# Patient Record
Sex: FEMALE | Race: BLACK OR AFRICAN AMERICAN | NOT HISPANIC OR LATINO | Employment: FULL TIME | URBAN - METROPOLITAN AREA
[De-identification: names, ages, dates, MRNs, and addresses within clinical notes are randomized per-mention and may not be internally consistent; named-entity substitution may affect disease eponyms.]

---

## 2017-01-03 ENCOUNTER — OFFICE VISIT (OUTPATIENT)
Dept: CARDIOLOGY | Facility: CLINIC | Age: 47
End: 2017-01-03
Payer: OTHER GOVERNMENT

## 2017-01-03 VITALS
DIASTOLIC BLOOD PRESSURE: 75 MMHG | HEIGHT: 67 IN | OXYGEN SATURATION: 97 % | HEART RATE: 74 BPM | SYSTOLIC BLOOD PRESSURE: 108 MMHG | BODY MASS INDEX: 36.36 KG/M2 | WEIGHT: 231.69 LBS

## 2017-01-03 DIAGNOSIS — R07.9 CHEST PAIN, UNSPECIFIED TYPE: Primary | ICD-10-CM

## 2017-01-03 DIAGNOSIS — I25.10 CORONARY ARTERY DISEASE INVOLVING NATIVE CORONARY ARTERY OF NATIVE HEART WITHOUT ANGINA PECTORIS: ICD-10-CM

## 2017-01-03 DIAGNOSIS — E66.9 OBESITY (BMI 30-39.9): ICD-10-CM

## 2017-01-03 DIAGNOSIS — Z95.5 STATUS POST CORONARY ARTERY STENT PLACEMENT: ICD-10-CM

## 2017-01-03 DIAGNOSIS — I25.2 HISTORY OF NON-ST ELEVATION MYOCARDIAL INFARCTION (NSTEMI): ICD-10-CM

## 2017-01-03 PROCEDURE — 99213 OFFICE O/P EST LOW 20 MIN: CPT | Mod: PBBFAC,PO | Performed by: INTERNAL MEDICINE

## 2017-01-03 PROCEDURE — 99214 OFFICE O/P EST MOD 30 MIN: CPT | Mod: S$PBB,,, | Performed by: INTERNAL MEDICINE

## 2017-01-03 PROCEDURE — 99999 PR PBB SHADOW E&M-EST. PATIENT-LVL III: CPT | Mod: PBBFAC,,, | Performed by: INTERNAL MEDICINE

## 2017-01-03 NOTE — PROGRESS NOTES
Subjective:   Patient ID:  Lizet Palencia is a 46 y.o. female who presents for follow-up of Follow-up      Problem List Items Addressed This Visit        Cardiac    CAD (coronary artery disease)    History of non-ST elevation myocardial infarction (NSTEMI)       Fluids/Electrolytes/Nutrition/GI    Obesity (BMI 30-39.9)       Other    Status post coronary artery stent placement      Other Visit Diagnoses     Chest pain, unspecified type    -  Primary          HPI: Patient with PMh of CAD s/p stent. Since previous visit she had hospital visit for chest pain and angiogram done was negative for obstructive CAD. She is concerned because 2 day ago she started having a heaviness in her chest located mid sternally. No associated SOB. She is active with kids, work, shopping and she climb stairs at work and she never experience chest discomfort. BP is controlled. Heaviness has been constant lasting for the last 2 days.     Review of Systems   Constitution: Negative.   HENT: Negative.    Eyes: Negative.    Cardiovascular: Negative.    Respiratory: Negative.    Endocrine: Negative.    Hematologic/Lymphatic: Negative.    Skin: Negative.    Musculoskeletal: Negative.    Gastrointestinal: Negative.    Neurological: Negative.        Patient's Medications   New Prescriptions    No medications on file   Previous Medications    AMLODIPINE (NORVASC) 10 MG TABLET    Take 1 tablet (10 mg total) by mouth once daily.    ASPIRIN (ECOTRIN) 81 MG EC TABLET    Take 1 tablet (81 mg total) by mouth once daily.    ATORVASTATIN (LIPITOR) 80 MG TABLET    Take 1 tablet (80 mg total) by mouth once daily.    CARVEDILOL (COREG) 25 MG TABLET    Take 1 tablet (25 mg total) by mouth 2 (two) times daily with meals.    CHLORTHALIDONE (HYGROTEN) 25 MG TAB    Take 1 tablet (25 mg total) by mouth once daily.    HYDRALAZINE (APRESOLINE) 25 MG TABLET    Take 1 tablet (25 mg total) by mouth every 12 (twelve) hours.    LORATADINE (CLARITIN) 10 MG TABLET     TAKE 1 TABLET BY MOUTH EVERY DAY    LOSARTAN (COZAAR) 100 MG TABLET    Take 1 tablet (100 mg total) by mouth once daily.    NITROGLYCERIN (NITROSTAT) 0.4 MG SL TABLET    Place 1 tablet (0.4 mg total) under the tongue every 5 (five) minutes as needed for Chest pain.    PANTOPRAZOLE (PROTONIX) 40 MG TABLET    Take 1 tablet (40 mg total) by mouth once daily.    POTASSIUM CHLORIDE SA (K-DUR,KLOR-CON) 20 MEQ TABLET    Take 1 tablet (20 mEq total) by mouth once daily.    TRIAMCINOLONE ACETONIDE 0.1% (KENALOG) 0.1 % CREAM    Apply topically 2 (two) times daily.   Modified Medications    No medications on file   Discontinued Medications    No medications on file       Objective:   Physical Exam   Constitutional: She is oriented to person, place, and time. She appears well-developed and well-nourished. No distress.   Examination of the digits showed no clubbing or cyanosis   HENT:   Head: Normocephalic and atraumatic.   Eyes: Conjunctivae are normal. Pupils are equal, round, and reactive to light. Right eye exhibits no discharge.   Neck: Normal range of motion. Neck supple. No JVD present. No thyromegaly present.   No carotid bruits   Cardiovascular: Normal rate, regular rhythm, S1 normal, S2 normal, normal heart sounds, intact distal pulses and normal pulses.  PMI is not displaced.  Exam reveals no gallop, no friction rub and no opening snap.    No murmur heard.  Pulmonary/Chest: Effort normal and breath sounds normal. No respiratory distress. She has no wheezes. She has no rales. She exhibits no tenderness.   Abdominal: Soft. Bowel sounds are normal. She exhibits no distension and no mass. There is no tenderness. There is no guarding.   No hepatosplenomegaly   Musculoskeletal: Normal range of motion. She exhibits no edema or tenderness.   Lymphadenopathy:     She has no cervical adenopathy.   Neurological: She is alert and oriented to person, place, and time.   Skin: Skin is warm. No rash noted. She is not diaphoretic. No  erythema.   Psychiatric: She has a normal mood and affect.   Nursing note and vitals reviewed.      ECGs reviewed  LABS reviewed  Imaging including Echoes reviewed    Angiographic Results     Diagnostic:          Patient has a left dominant coronary artery.        The coronary vessels are all normal.        - Left Main Coronary Artery:             The LM is normal. There is BERTHA 3 flow.     - Left Anterior Descending Artery:             The LAD is normal. There is BERTHA 3 flow.     - Left Circumflex Artery:             The mid LCX is normal is patent. There is BERTHA 3 flow.     - Right Coronary Artery:             The RCA is normal. There is BERTHA 3 flow.     - Left Renal Artery:             The proximal left renal is normal.     - Right Renal Artery:             The proximal right renal is normal.     - Common Femoral Artery:             The right CFA is normal.     - Femoral Artery:             The femoral artery is normal.      Assessment:     1. Chest pain, unspecified type    2. Coronary artery disease involving native coronary artery of native heart without angina pectoris    3. History of non-ST elevation myocardial infarction (NSTEMI)    4. Obesity (BMI 30-39.9)    5. Status post coronary artery stent placement        Plan:     Continue current medications. Symptoms being experienced by patient does not appear to be cardiac.  Activity as tolerated  Weight loss  F/u in 6 months

## 2017-05-13 ENCOUNTER — PATIENT MESSAGE (OUTPATIENT)
Dept: FAMILY MEDICINE | Facility: CLINIC | Age: 47
End: 2017-05-13

## 2017-05-15 ENCOUNTER — TELEPHONE (OUTPATIENT)
Dept: FAMILY MEDICINE | Facility: CLINIC | Age: 47
End: 2017-05-15

## 2017-05-15 NOTE — TELEPHONE ENCOUNTER
----- Message from Jerilyn Harvey sent at 5/13/2017  8:41 AM CDT -----  Contact: [phone]/self  Pt would like to speak with you about getting the earlier appointment that was offered to her via e-mail.   Please advise

## 2017-06-05 DIAGNOSIS — Z95.5 STATUS POST CORONARY ARTERY STENT PLACEMENT: ICD-10-CM

## 2017-06-05 DIAGNOSIS — I25.10 CORONARY ARTERY DISEASE INVOLVING NATIVE CORONARY ARTERY OF NATIVE HEART WITHOUT ANGINA PECTORIS: ICD-10-CM

## 2017-06-09 RX ORDER — ATORVASTATIN CALCIUM 80 MG/1
TABLET, FILM COATED ORAL
Qty: 30 TABLET | Refills: 0 | Status: SHIPPED | OUTPATIENT
Start: 2017-06-09 | End: 2017-10-25 | Stop reason: SDUPTHER

## 2017-07-26 ENCOUNTER — TELEPHONE (OUTPATIENT)
Dept: FAMILY MEDICINE | Facility: CLINIC | Age: 47
End: 2017-07-26

## 2017-07-26 NOTE — TELEPHONE ENCOUNTER
----- Message from Marline Simpson sent at 7/26/2017  7:32 AM CDT -----  Contact: Self/ [phone]  Patient would like to be seen today because she is having headaches. Please advise.

## 2017-07-26 NOTE — TELEPHONE ENCOUNTER
Returned patient's call. She stated she has been having headaches and would like to be seen today. I informed her that dr. Louis is booked for the day. I offered her an urgent visit at [place], and she declined. She stated she will see how she feels as the day goes on.

## 2017-08-15 ENCOUNTER — TELEPHONE (OUTPATIENT)
Dept: FAMILY MEDICINE | Facility: CLINIC | Age: 47
End: 2017-08-15

## 2017-08-15 DIAGNOSIS — Z12.31 ENCOUNTER FOR SCREENING MAMMOGRAM FOR BREAST CANCER: Primary | ICD-10-CM

## 2017-08-15 NOTE — TELEPHONE ENCOUNTER
----- Message from Clara Vann MA sent at 8/15/2017  9:34 AM CDT -----  Contact: [phone]/SELF      ----- Message -----  From: Jerilyn Harvey  Sent: 8/15/2017   9:30 AM  To: Missy JOVEL Staff    Patient would like orders put in the system for a Mammo. Please advise.

## 2017-09-27 ENCOUNTER — HOSPITAL ENCOUNTER (OUTPATIENT)
Dept: RADIOLOGY | Facility: HOSPITAL | Age: 47
Discharge: HOME | End: 2017-09-27
Attending: FAMILY MEDICINE
Payer: OTHER GOVERNMENT

## 2017-09-27 VITALS — WEIGHT: 231 LBS | HEIGHT: 67 IN | BODY MASS INDEX: 36.26 KG/M2

## 2017-09-27 DIAGNOSIS — Z12.31 ENCOUNTER FOR SCREENING MAMMOGRAM FOR BREAST CANCER: ICD-10-CM

## 2017-09-27 PROCEDURE — 77067 SCR MAMMO BI INCL CAD: CPT | Mod: TC

## 2017-09-27 PROCEDURE — 77067 SCR MAMMO BI INCL CAD: CPT | Mod: 26,,, | Performed by: RADIOLOGY

## 2017-10-25 ENCOUNTER — OFFICE VISIT (OUTPATIENT)
Dept: FAMILY MEDICINE | Facility: CLINIC | Age: 47
End: 2017-10-25
Payer: OTHER GOVERNMENT

## 2017-10-25 VITALS
DIASTOLIC BLOOD PRESSURE: 89 MMHG | HEIGHT: 69 IN | TEMPERATURE: 99 F | OXYGEN SATURATION: 99 % | SYSTOLIC BLOOD PRESSURE: 127 MMHG | WEIGHT: 232.56 LBS | BODY MASS INDEX: 34.45 KG/M2 | HEART RATE: 81 BPM

## 2017-10-25 DIAGNOSIS — R76.8 HEPATITIS C ANTIBODY TEST POSITIVE: ICD-10-CM

## 2017-10-25 DIAGNOSIS — J30.9 CHRONIC ALLERGIC RHINITIS, UNSPECIFIED SEASONALITY, UNSPECIFIED TRIGGER: ICD-10-CM

## 2017-10-25 DIAGNOSIS — Z95.5 STATUS POST CORONARY ARTERY STENT PLACEMENT: ICD-10-CM

## 2017-10-25 DIAGNOSIS — I21.4 NSTEMI (NON-ST ELEVATED MYOCARDIAL INFARCTION): ICD-10-CM

## 2017-10-25 DIAGNOSIS — E66.9 OBESITY (BMI 30-39.9): ICD-10-CM

## 2017-10-25 DIAGNOSIS — Z79.82 LONG-TERM USE OF ASPIRIN THERAPY: ICD-10-CM

## 2017-10-25 DIAGNOSIS — I25.10 CORONARY ARTERY DISEASE INVOLVING NATIVE CORONARY ARTERY OF NATIVE HEART WITHOUT ANGINA PECTORIS: ICD-10-CM

## 2017-10-25 DIAGNOSIS — I25.2 HISTORY OF NON-ST ELEVATION MYOCARDIAL INFARCTION (NSTEMI): ICD-10-CM

## 2017-10-25 DIAGNOSIS — Z00.00 ROUTINE GENERAL MEDICAL EXAMINATION AT A HEALTH CARE FACILITY: Primary | ICD-10-CM

## 2017-10-25 DIAGNOSIS — E87.6 DIURETIC-INDUCED HYPOKALEMIA: ICD-10-CM

## 2017-10-25 DIAGNOSIS — I10 ACCELERATED HYPERTENSION: ICD-10-CM

## 2017-10-25 DIAGNOSIS — T50.2X5A DIURETIC-INDUCED HYPOKALEMIA: ICD-10-CM

## 2017-10-25 DIAGNOSIS — Z79.899 MEDICATION MANAGEMENT: ICD-10-CM

## 2017-10-25 DIAGNOSIS — Z23 NEED FOR DIPHTHERIA-TETANUS-PERTUSSIS (TDAP) VACCINE: ICD-10-CM

## 2017-10-25 PROCEDURE — 90715 TDAP VACCINE 7 YRS/> IM: CPT | Mod: PBBFAC,PO

## 2017-10-25 PROCEDURE — 99999 PR PBB SHADOW E&M-EST. PATIENT-LVL III: CPT | Mod: PBBFAC,,, | Performed by: FAMILY MEDICINE

## 2017-10-25 PROCEDURE — 90471 IMMUNIZATION ADMIN: CPT | Mod: PBBFAC,PO

## 2017-10-25 PROCEDURE — 99213 OFFICE O/P EST LOW 20 MIN: CPT | Mod: PBBFAC,PO | Performed by: FAMILY MEDICINE

## 2017-10-25 PROCEDURE — 99396 PREV VISIT EST AGE 40-64: CPT | Mod: S$PBB,,, | Performed by: FAMILY MEDICINE

## 2017-10-25 RX ORDER — ATORVASTATIN CALCIUM 80 MG/1
80 TABLET, FILM COATED ORAL DAILY
Qty: 90 TABLET | Refills: 3 | Status: SHIPPED | OUTPATIENT
Start: 2017-10-25 | End: 2019-05-22 | Stop reason: SDUPTHER

## 2017-10-25 RX ORDER — CHLORTHALIDONE 25 MG/1
25 TABLET ORAL DAILY
Qty: 90 TABLET | Refills: 3 | Status: SHIPPED | OUTPATIENT
Start: 2017-10-25 | End: 2019-05-22 | Stop reason: SDUPTHER

## 2017-10-25 RX ORDER — LOSARTAN POTASSIUM 100 MG/1
100 TABLET ORAL DAILY
Qty: 90 TABLET | Refills: 3 | Status: SHIPPED | OUTPATIENT
Start: 2017-10-25 | End: 2019-05-22 | Stop reason: SDUPTHER

## 2017-10-25 RX ORDER — CARVEDILOL 25 MG/1
25 TABLET ORAL 2 TIMES DAILY WITH MEALS
Qty: 180 TABLET | Refills: 3 | Status: SHIPPED | OUTPATIENT
Start: 2017-10-25 | End: 2019-01-09 | Stop reason: SDUPTHER

## 2017-10-25 RX ORDER — POTASSIUM CHLORIDE 20 MEQ/1
20 TABLET, EXTENDED RELEASE ORAL DAILY
Qty: 90 TABLET | Refills: 3 | Status: SHIPPED | OUTPATIENT
Start: 2017-10-25 | End: 2018-03-14 | Stop reason: SDUPTHER

## 2017-10-25 RX ORDER — AMLODIPINE BESYLATE 10 MG/1
10 TABLET ORAL DAILY
Qty: 90 TABLET | Refills: 3 | Status: SHIPPED | OUTPATIENT
Start: 2017-10-25 | End: 2018-03-10 | Stop reason: SDUPTHER

## 2017-10-25 RX ORDER — HYDRALAZINE HYDROCHLORIDE 25 MG/1
25 TABLET, FILM COATED ORAL EVERY 12 HOURS
Qty: 180 TABLET | Refills: 3 | Status: SHIPPED | OUTPATIENT
Start: 2017-10-25 | End: 2019-10-16

## 2017-10-25 NOTE — PROGRESS NOTES
Office Visit    Patient Name: Lizet Palencia    : 1970  MRN: [id number]    Subjective:  Lizet is a 47 y.o. female who presents today for:    Annual Exam (having sinus issues for the past month)    Lizet Palencia presents today for annual wellness exam.  She is of childbearing age and having regular periods.  No hot flashes. She has a gynecologist and is up to date with pap-- last seen by Dr Crocker 2015 and     They have been feeling overall well but having ongoing sinus symptoms-- itchy throat and post nasal drip. Takes flonase as needed.      General lifestyle habits are as follows:  Diet is described as fair-- watching salt and avoids fried foods but eats out a lot-- does try to order fish and healthier options, exercise is described as fair-- recently started back walking, sleep is described as fair-- needs to sleep more and does snore but does snore and no morning headaches. Weight is recently increased, she gained back the 10 lbs she was able to loose at this time last year.     Immunizations: none on file, declines FLU shot, TDaP due and ordered    Screening Tests: mammogram 2017-- repeat 1 year, normal PAP/HPV 2015    Eye/Dental: up to date with both      2016: angiogram negative for obstructive CAD, follows with cardiology Dr Stauffer with most recent visit 1/3/2017    Past Medical History  Past Medical History:   Diagnosis Date    Accelerated hypertension 2015    Allergic rhinitis 1/3/2015    Coronary artery disease 2016    Hepatitis C antibody test positive 2016    Negative HCV RNA 2016, will check again in 3 months but no detectable HCV virus HCV AB will likely remain positive for life    History of non-ST elevation myocardial infarction (NSTEMI) 2016    Menorrhagia     Obesity (BMI 30-39.9) 2015    Status post coronary artery stent placement 2015    LCx stent         Past Surgical History  Past Surgical History:   Procedure  "Laterality Date     SECTION, CLASSIC      x3    CORONARY STENT PLACEMENT      D&C Hysteroscopy      right arm surgery  2009    TUBAL LIGATION         Family History  Family History   Problem Relation Age of Onset    Hypertension Mother     Diabetes Maternal Aunt     Hypertension Maternal Aunt     Cancer Neg Hx     Heart disease Neg Hx        Social History  Social History     Social History    Marital status:      Spouse name: N/A    Number of children: N/A    Years of education: N/A     Occupational History    Not on file.     Social History Main Topics    Smoking status: Never Smoker    Smokeless tobacco: Never Used    Alcohol use Yes      Comment: social    Drug use: No    Sexual activity: Yes     Partners: Male     Birth control/ protection: Surgical     Other Topics Concern    Not on file     Social History Narrative    No narrative on file       Current Medications  Medications reviewed and updated.     Allergies   Review of patient's allergies indicates:  No Known Allergies    Review of Systems (Pertinent positives)  Review of Systems   Constitutional: Negative for unexpected weight change.   HENT: Positive for postnasal drip. Negative for sinus pressure.    Eyes: Negative for visual disturbance.   Respiratory: Negative for chest tightness and shortness of breath.    Cardiovascular: Negative for chest pain, palpitations and leg swelling.   Gastrointestinal: Negative for constipation and diarrhea.   Genitourinary: Negative for difficulty urinating and menstrual problem.   Musculoskeletal: Negative for arthralgias and back pain.   Neurological: Negative for dizziness, light-headedness and headaches.   Psychiatric/Behavioral: Negative for sleep disturbance.       /89 (BP Location: Left arm, Patient Position: Sitting)   Pulse 81   Temp 98.8 °F (37.1 °C)   Ht 5' 9" (1.753 m)   Wt 105.5 kg (232 lb 9.4 oz)   LMP 10/01/2017   SpO2 99%   BMI 34.35 kg/m²     Physical " Exam   Constitutional: She is oriented to person, place, and time. She appears well-developed and well-nourished. No distress.   HENT:   Head: Normocephalic and atraumatic.   Right Ear: Ear canal normal. Tympanic membrane is not erythematous and not bulging.   Left Ear: Ear canal normal. Tympanic membrane is not erythematous and not bulging.   Mouth/Throat: No oropharyngeal exudate.   Eyes: Conjunctivae are normal.   Neck: Carotid bruit is not present. No thyroid mass and no thyromegaly present.   Cardiovascular: Normal rate, regular rhythm and normal heart sounds.    No murmur heard.  Pulses:       Dorsalis pedis pulses are 2+ on the right side, and 2+ on the left side.   Pulmonary/Chest: Effort normal and breath sounds normal. No respiratory distress.   Abdominal: Soft. Bowel sounds are normal. She exhibits no distension and no mass. There is no hepatosplenomegaly. There is no tenderness.   Musculoskeletal: Normal range of motion.   Lymphadenopathy:     She has no cervical adenopathy.   Neurological: She is alert and oriented to person, place, and time.   Skin: Skin is warm and dry. No rash noted.   Psychiatric: She has a normal mood and affect.   Vitals reviewed.        Assessment/Plan:  Lizet Palencia is a 47 y.o. female who presents today for :    Lizet was seen today for annual exam.    Diagnoses and all orders for this visit:    Routine general medical examination at a health care facility  -     Comprehensive metabolic panel; Future  -     Hemoglobin A1c; Future  -     Lipid panel; Future  -     TSH; Future  -     CBC auto differential; Future    History of non-ST elevation myocardial infarction (NSTEMI)    Obesity (BMI 30-39.9)  -     Comprehensive metabolic panel; Future  -     Hemoglobin A1c; Future  -     Lipid panel; Future  -     TSH; Future    Accelerated hypertension  -     Comprehensive metabolic panel; Future  -     Lipid panel; Future  -     TSH; Future  -     CBC auto differential;  Future  -     losartan (COZAAR) 100 MG tablet; Take 1 tablet (100 mg total) by mouth once daily.  -     hydrALAZINE (APRESOLINE) 25 MG tablet; Take 1 tablet (25 mg total) by mouth every 12 (twelve) hours.  -     chlorthalidone (HYGROTEN) 25 MG Tab; Take 1 tablet (25 mg total) by mouth once daily.  -     carvedilol (COREG) 25 MG tablet; Take 1 tablet (25 mg total) by mouth 2 (two) times daily with meals.  -     amLODIPine (NORVASC) 10 MG tablet; Take 1 tablet (10 mg total) by mouth once daily.    Chronic allergic rhinitis, unspecified seasonality, unspecified trigger    Coronary artery disease involving native coronary artery of native heart without angina pectoris  -     carvedilol (COREG) 25 MG tablet; Take 1 tablet (25 mg total) by mouth 2 (two) times daily with meals.  -     atorvastatin (LIPITOR) 80 MG tablet; Take 1 tablet (80 mg total) by mouth once daily.    Hepatitis C antibody test positive  -     HEPATITIS C RNA, QUANTITATIVE, PCR; Future    Status post coronary artery stent placement  -     losartan (COZAAR) 100 MG tablet; Take 1 tablet (100 mg total) by mouth once daily.  -     atorvastatin (LIPITOR) 80 MG tablet; Take 1 tablet (80 mg total) by mouth once daily.    Long-term use of aspirin therapy    Medication management  -     Comprehensive metabolic panel; Future  -     Hemoglobin A1c; Future  -     Lipid panel; Future  -     TSH; Future  -     CBC auto differential; Future    Need for diphtheria-tetanus-pertussis (Tdap) vaccine  -     (In Office Administered) Tdap Vaccine    Diuretic-induced hypokalemia  -     potassium chloride SA (K-DUR,KLOR-CON) 20 MEQ tablet; Take 1 tablet (20 mEq total) by mouth once daily.    NSTEMI (non-ST elevated myocardial infarction)  -     losartan (COZAAR) 100 MG tablet; Take 1 tablet (100 mg total) by mouth once daily.    Coronary artery disease involving native coronary artery of native heart without angina pectoris  Comments:  UNREMARKABLE ANGIOGRAM 7/2016, follows  with Dr Stauffer Cardiology  Orders:  -     carvedilol (COREG) 25 MG tablet; Take 1 tablet (25 mg total) by mouth 2 (two) times daily with meals.  -     atorvastatin (LIPITOR) 80 MG tablet; Take 1 tablet (80 mg total) by mouth once daily.            ICD-10-CM ICD-9-CM    1. Routine general medical examination at a health care facility Z00.00 V70.0 Comprehensive metabolic panel      Hemoglobin A1c      Lipid panel      TSH      CBC auto differential   2. History of non-ST elevation myocardial infarction (NSTEMI) I25.2 412    3. Obesity (BMI 30-39.9) E66.9 278.00 Comprehensive metabolic panel      Hemoglobin A1c      Lipid panel      TSH   4. Accelerated hypertension I10 401.0 Comprehensive metabolic panel      Lipid panel      TSH      CBC auto differential      losartan (COZAAR) 100 MG tablet      hydrALAZINE (APRESOLINE) 25 MG tablet      chlorthalidone (HYGROTEN) 25 MG Tab      carvedilol (COREG) 25 MG tablet      amLODIPine (NORVASC) 10 MG tablet   5. Chronic allergic rhinitis, unspecified seasonality, unspecified trigger J30.9 477.9    6. Coronary artery disease involving native coronary artery of native heart without angina pectoris I25.10 414.01 carvedilol (COREG) 25 MG tablet      atorvastatin (LIPITOR) 80 MG tablet   7. Hepatitis C antibody test positive R76.8 795.79 HEPATITIS C RNA, QUANTITATIVE, PCR   8. Status post coronary artery stent placement Z95.5 V45.82 losartan (COZAAR) 100 MG tablet      atorvastatin (LIPITOR) 80 MG tablet   9. Long-term use of aspirin therapy Z79.82 V58.66    10. Medication management Z79.899 V58.69 Comprehensive metabolic panel      Hemoglobin A1c      Lipid panel      TSH      CBC auto differential   11. Need for diphtheria-tetanus-pertussis (Tdap) vaccine Z23 V06.1 (In Office Administered) Tdap Vaccine   12. Diuretic-induced hypokalemia E87.6 276.8 potassium chloride SA (K-DUR,KLOR-CON) 20 MEQ tablet    T50.2X5A E944.4    13. NSTEMI (non-ST elevated myocardial infarction) I21.4  "410.70 losartan (COZAAR) 100 MG tablet   14. Coronary artery disease involving native coronary artery of native heart without angina pectoris I25.10 414.01 carvedilol (COREG) 25 MG tablet      atorvastatin (LIPITOR) 80 MG tablet    UNREMARKABLE ANGIOGRAM 7/2016, follows with Dr Stauffer Cardiology       Patient Instructions   Continue all medications as prescribed.  Very important to take carvedilol (Coreg) both morning and night.  Okay to do a trial off of hydralazine since you have only been taking it once a day and it likely is not doing much for your pressures.  Please monitor home blood pressure and heart rates and resume hydralazine 2-3 times daily if they are elevated greater than 140/90 on a consistent basis.  Please keep us up to date and notify us if there are issues with elevated pressures.  For ALLERGIC rhinitis with postnasal drip, continue Flonase but start using it every day and due to persistent symptoms.  Start daily ALLEGRA nonsedating antihistamine to help dry up the postnasal drip/drainage.  Do not take any products that have a "D" i.e. decongestant as these can raise blood pressure.        Return in about 6 months (around 4/25/2018) for return as needed for new concerns.  "

## 2017-10-25 NOTE — PATIENT INSTRUCTIONS
"Continue all medications as prescribed.  Very important to take carvedilol (Coreg) both morning and night.  Okay to do a trial off of hydralazine since you have only been taking it once a day and it likely is not doing much for your pressures.  Please monitor home blood pressure and heart rates and resume hydralazine 2-3 times daily if they are elevated greater than 140/90 on a consistent basis.  Please keep us up to date and notify us if there are issues with elevated pressures.  For ALLERGIC rhinitis with postnasal drip, continue Flonase but start using it every day and due to persistent symptoms.  Start daily ALLEGRA nonsedating antihistamine to help dry up the postnasal drip/drainage.  Do not take any products that have a "D" i.e. decongestant as these can raise blood pressure.  "

## 2017-10-28 ENCOUNTER — LAB VISIT (OUTPATIENT)
Dept: LAB | Facility: HOSPITAL | Age: 47
End: 2017-10-28
Attending: FAMILY MEDICINE
Payer: OTHER GOVERNMENT

## 2017-10-28 DIAGNOSIS — Z79.899 MEDICATION MANAGEMENT: ICD-10-CM

## 2017-10-28 DIAGNOSIS — R76.8 HEPATITIS C ANTIBODY TEST POSITIVE: ICD-10-CM

## 2017-10-28 DIAGNOSIS — I10 ACCELERATED HYPERTENSION: ICD-10-CM

## 2017-10-28 DIAGNOSIS — E66.9 OBESITY (BMI 30-39.9): ICD-10-CM

## 2017-10-28 DIAGNOSIS — Z00.00 ROUTINE GENERAL MEDICAL EXAMINATION AT A HEALTH CARE FACILITY: ICD-10-CM

## 2017-10-28 LAB
BASOPHILS # BLD AUTO: 0.06 K/UL
BASOPHILS NFR BLD: 1.4 %
DIFFERENTIAL METHOD: ABNORMAL
EOSINOPHIL # BLD AUTO: 0.1 K/UL
EOSINOPHIL NFR BLD: 2.1 %
ERYTHROCYTE [DISTWIDTH] IN BLOOD BY AUTOMATED COUNT: 13.3 %
ESTIMATED AVG GLUCOSE: 131 MG/DL
HBA1C MFR BLD HPLC: 6.2 %
HCT VFR BLD AUTO: 39.6 %
HGB BLD-MCNC: 12.7 G/DL
IMM GRANULOCYTES # BLD AUTO: 0 K/UL
IMM GRANULOCYTES NFR BLD AUTO: 0 %
LYMPHOCYTES # BLD AUTO: 1.8 K/UL
LYMPHOCYTES NFR BLD: 43.5 %
MCH RBC QN AUTO: 29.7 PG
MCHC RBC AUTO-ENTMCNC: 32.1 G/DL
MCV RBC AUTO: 93 FL
MONOCYTES # BLD AUTO: 0.7 K/UL
MONOCYTES NFR BLD: 15.9 %
NEUTROPHILS # BLD AUTO: 1.6 K/UL
NEUTROPHILS NFR BLD: 37.1 %
NRBC BLD-RTO: 0 /100 WBC
PLATELET # BLD AUTO: 210 K/UL
PMV BLD AUTO: 11.5 FL
RBC # BLD AUTO: 4.27 M/UL
TSH SERPL DL<=0.005 MIU/L-ACNC: 0.55 UIU/ML
WBC # BLD AUTO: 4.21 K/UL

## 2017-10-28 PROCEDURE — 80061 LIPID PANEL: CPT

## 2017-10-28 PROCEDURE — 80053 COMPREHEN METABOLIC PANEL: CPT

## 2017-10-28 PROCEDURE — 36415 COLL VENOUS BLD VENIPUNCTURE: CPT | Mod: PO

## 2017-10-28 PROCEDURE — 87522 HEPATITIS C REVRS TRNSCRPJ: CPT

## 2017-10-28 PROCEDURE — 83036 HEMOGLOBIN GLYCOSYLATED A1C: CPT

## 2017-10-28 PROCEDURE — 84443 ASSAY THYROID STIM HORMONE: CPT

## 2017-10-28 PROCEDURE — 85025 COMPLETE CBC W/AUTO DIFF WBC: CPT

## 2017-10-29 LAB
ALBUMIN SERPL BCP-MCNC: 3.3 G/DL
ALP SERPL-CCNC: 38 U/L
ALT SERPL W/O P-5'-P-CCNC: 12 U/L
ANION GAP SERPL CALC-SCNC: 11 MMOL/L
AST SERPL-CCNC: 19 U/L
BILIRUB SERPL-MCNC: 0.7 MG/DL
BUN SERPL-MCNC: 14 MG/DL
CALCIUM SERPL-MCNC: 9.7 MG/DL
CHLORIDE SERPL-SCNC: 105 MMOL/L
CHOLEST SERPL-MCNC: 204 MG/DL
CHOLEST/HDLC SERPL: 4.3 {RATIO}
CO2 SERPL-SCNC: 23 MMOL/L
CREAT SERPL-MCNC: 0.9 MG/DL
EST. GFR  (AFRICAN AMERICAN): >60 ML/MIN/1.73 M^2
EST. GFR  (NON AFRICAN AMERICAN): >60 ML/MIN/1.73 M^2
GLUCOSE SERPL-MCNC: 84 MG/DL
HDLC SERPL-MCNC: 47 MG/DL
HDLC SERPL: 23 %
LDLC SERPL CALC-MCNC: 148.8 MG/DL
NONHDLC SERPL-MCNC: 157 MG/DL
POTASSIUM SERPL-SCNC: 3.5 MMOL/L
PROT SERPL-MCNC: 7.6 G/DL
SODIUM SERPL-SCNC: 139 MMOL/L
TRIGL SERPL-MCNC: 41 MG/DL

## 2017-11-01 LAB
HCV LOG: <1.08 LOG (10) IU/ML
HCV RNA QUANT PCR: <12 IU/ML
HCV, QUALITATIVE: NOT DETECTED IU/ML

## 2017-12-16 DIAGNOSIS — T50.2X5A DIURETIC-INDUCED HYPOKALEMIA: ICD-10-CM

## 2017-12-16 DIAGNOSIS — E87.6 DIURETIC-INDUCED HYPOKALEMIA: ICD-10-CM

## 2017-12-17 RX ORDER — POTASSIUM CHLORIDE 20 MEQ/1
20 TABLET, EXTENDED RELEASE ORAL DAILY
Qty: 90 TABLET | Refills: 4 | Status: SHIPPED | OUTPATIENT
Start: 2017-12-17 | End: 2018-03-14

## 2018-03-10 DIAGNOSIS — I10 ACCELERATED HYPERTENSION: ICD-10-CM

## 2018-03-10 RX ORDER — AMLODIPINE BESYLATE 10 MG/1
TABLET ORAL
Qty: 90 TABLET | Refills: 4 | Status: SHIPPED | OUTPATIENT
Start: 2018-03-10 | End: 2019-05-23 | Stop reason: SDUPTHER

## 2018-03-12 RX ORDER — TRIAMCINOLONE ACETONIDE 1 MG/G
CREAM TOPICAL
Refills: 1 | COMMUNITY
Start: 2018-02-05 | End: 2018-03-14

## 2018-03-14 ENCOUNTER — OFFICE VISIT (OUTPATIENT)
Dept: FAMILY MEDICINE | Facility: CLINIC | Age: 48
End: 2018-03-14
Payer: OTHER GOVERNMENT

## 2018-03-14 VITALS
SYSTOLIC BLOOD PRESSURE: 123 MMHG | HEART RATE: 77 BPM | TEMPERATURE: 99 F | DIASTOLIC BLOOD PRESSURE: 82 MMHG | BODY MASS INDEX: 37.54 KG/M2 | OXYGEN SATURATION: 98 % | HEIGHT: 67 IN | WEIGHT: 239.19 LBS

## 2018-03-14 DIAGNOSIS — I10 ACCELERATED HYPERTENSION: ICD-10-CM

## 2018-03-14 DIAGNOSIS — E66.9 OBESITY (BMI 30-39.9): ICD-10-CM

## 2018-03-14 DIAGNOSIS — E87.6 DIURETIC-INDUCED HYPOKALEMIA: ICD-10-CM

## 2018-03-14 DIAGNOSIS — R73.03 PREDIABETES: ICD-10-CM

## 2018-03-14 DIAGNOSIS — I25.10 CORONARY ARTERY DISEASE INVOLVING NATIVE CORONARY ARTERY OF NATIVE HEART WITHOUT ANGINA PECTORIS: Primary | ICD-10-CM

## 2018-03-14 DIAGNOSIS — T50.2X5A DIURETIC-INDUCED HYPOKALEMIA: ICD-10-CM

## 2018-03-14 DIAGNOSIS — D25.9 UTERINE LEIOMYOMA, UNSPECIFIED LOCATION: ICD-10-CM

## 2018-03-14 DIAGNOSIS — I25.2 HISTORY OF NON-ST ELEVATION MYOCARDIAL INFARCTION (NSTEMI): ICD-10-CM

## 2018-03-14 DIAGNOSIS — Z79.82 LONG-TERM USE OF ASPIRIN THERAPY: ICD-10-CM

## 2018-03-14 DIAGNOSIS — N93.9 VAGINAL BLEEDING: ICD-10-CM

## 2018-03-14 PROCEDURE — 99999 PR PBB SHADOW E&M-EST. PATIENT-LVL IV: CPT | Mod: PBBFAC,,, | Performed by: FAMILY MEDICINE

## 2018-03-14 PROCEDURE — 99214 OFFICE O/P EST MOD 30 MIN: CPT | Mod: S$PBB,,, | Performed by: FAMILY MEDICINE

## 2018-03-14 PROCEDURE — 99214 OFFICE O/P EST MOD 30 MIN: CPT | Mod: PBBFAC,PO | Performed by: FAMILY MEDICINE

## 2018-03-14 NOTE — PROGRESS NOTES
" Office Visit    Patient Name: Lizet Palencia    : 1970  MRN: [id number]    Subjective:  Lizet is a 48 y.o. female who presents today for:    Follow-up    47 yo female with CAD w/ h/o stent/NSTEMI, HTN, prediabetes, HLD, Obesity,  last seen by me 10/5/2017 for annual physical and here today for 6 month follow up.     Instructions following annual exam: "Continue all medications as prescribed.  Very important to take carvedilol (Coreg) both morning and night.  Okay to do a trial off of hydralazine since you have only been taking it once a day and it likely is not doing much for your pressures.  Please monitor home blood pressure and heart rates and resume hydralazine 2-3 times daily if they are elevated greater than 140/90 on a consistent basis."    She reports that her home pressures over the last 6 months have been controlled.  She is taking the 25 mg Hydralazine twice daily and overall much improved compliance.  She has had hypokalemia from her Hygroten and is taking her potassium pill. She complains of increased saliva production after taking her pills-- a regurgitation type feeling but no n/v/dysphagia. She c/o hiccups with meals and some chest tightness for 1-2 minute episodes during stressful conversations, but not with exertion.  Has not needed nitroglycerine for relief. Only about once every 3 months.      Diet: skips breakfast and following a low salt diet  Exercise: none currently and plans to start a self defense class  Weight: increase of about 7 lbs in last few months      Today she complains of some vaginal spotting with bowel movements.  She has been having regular periods with a history of uterine fibroids    Past Medical History  Past Medical History:   Diagnosis Date    Accelerated hypertension 2015    Allergic rhinitis 1/3/2015    Coronary artery disease 2016    Coronary artery disease involving native coronary artery of native heart without angina pectoris 2016    " Hepatitis C antibody test positive 2016    Negative HCV RNA 2016, will check again in 3 months but no detectable HCV virus HCV AB will likely remain positive for life    History of non-ST elevation myocardial infarction (NSTEMI) 2016    Menorrhagia     Obesity (BMI 30-39.9) 2015    Prediabetes 3/14/2018    Status post coronary artery stent placement 2015    LCx stent         Past Surgical History  Past Surgical History:   Procedure Laterality Date     SECTION, CLASSIC      x3    CORONARY STENT PLACEMENT      D&C Hysteroscopy      right arm surgery  2009    TUBAL LIGATION         Family History  Family History   Problem Relation Age of Onset    Hypertension Mother     Diabetes Maternal Aunt     Hypertension Maternal Aunt     Cancer Neg Hx     Heart disease Neg Hx        Social History  Social History     Social History    Marital status:      Spouse name: N/A    Number of children: N/A    Years of education: N/A     Occupational History    Not on file.     Social History Main Topics    Smoking status: Never Smoker    Smokeless tobacco: Never Used    Alcohol use Yes      Comment: social    Drug use: No    Sexual activity: Yes     Partners: Male     Birth control/ protection: Surgical     Other Topics Concern    Not on file     Social History Narrative    No narrative on file       Current Medications  Medications reviewed and updated.     Allergies   Review of patient's allergies indicates:  No Known Allergies    Review of Systems (Pertinent positives)  Review of Systems   Constitutional: Negative for activity change and unexpected weight change.   HENT: Negative for hearing loss, rhinorrhea and trouble swallowing.    Eyes: Negative for discharge and visual disturbance.   Respiratory: Positive for chest tightness (about 1-2 minute episodes every 3 months, assoc w/ stress). Negative for wheezing.    Cardiovascular: Negative for chest pain and  "palpitations.   Gastrointestinal: Negative for blood in stool, constipation, diarrhea and vomiting.   Endocrine: Negative for polydipsia and polyuria.   Genitourinary: Positive for vaginal bleeding. Negative for difficulty urinating, dysuria and menstrual problem.   Musculoskeletal: Negative for arthralgias, joint swelling and neck pain.   Skin: Positive for rash.   Neurological: Negative for weakness and headaches.   Psychiatric/Behavioral: Negative for confusion and dysphoric mood.       /82   Pulse 77   Temp 99.2 °F (37.3 °C) (Oral)   Ht 5' 7" (1.702 m)   Wt 108.5 kg (239 lb 3.2 oz)   LMP 02/15/2018   SpO2 98%   BMI 37.46 kg/m²     Physical Exam   Constitutional: She is oriented to person, place, and time. She appears well-developed and well-nourished. No distress.   HENT:   Head: Normocephalic and atraumatic.   Eyes: Conjunctivae are normal.   Cardiovascular: Normal rate and regular rhythm.    Pulmonary/Chest: Effort normal and breath sounds normal.   Musculoskeletal: She exhibits no edema.   Neurological: She is alert and oriented to person, place, and time.   Skin: Skin is warm and dry.   Psychiatric: She has a normal mood and affect.   Vitals reviewed.        Assessment/Plan:  Lizet Palencia is a 48 y.o. female who presents today for :    Lizet was seen today for follow-up.    Diagnoses and all orders for this visit:    Coronary artery disease involving native coronary artery of native heart without angina pectoris  -     Hemoglobin A1c; Future  -     Comprehensive metabolic panel; Future  -     Lipid panel; Future    History of non-ST elevation myocardial infarction (NSTEMI)    Long-term use of aspirin therapy    Obesity (BMI 30-39.9)  -     Hemoglobin A1c; Future    Diuretic-induced hypokalemia  -     Comprehensive metabolic panel; Future    Accelerated hypertension    Prediabetes  -     Hemoglobin A1c; Future  -     Comprehensive metabolic panel; Future  -     Lipid panel; " Future    Uterine leiomyoma, unspecified location  -     Ambulatory referral to Obstetrics / Gynecology    Vaginal bleeding  -     Ambulatory referral to Obstetrics / Gynecology            ICD-10-CM ICD-9-CM    1. Coronary artery disease involving native coronary artery of native heart without angina pectoris I25.10 414.01 Hemoglobin A1c      Comprehensive metabolic panel      Lipid panel   2. History of non-ST elevation myocardial infarction (NSTEMI) I25.2 412    3. Long-term use of aspirin therapy Z79.82 V58.66    4. Obesity (BMI 30-39.9) E66.9 278.00 Hemoglobin A1c   5. Diuretic-induced hypokalemia E87.6 276.8 Comprehensive metabolic panel    T50.2X5A E944.4    6. Accelerated hypertension I10 401.0    7. Prediabetes R73.03 790.29 Hemoglobin A1c      Comprehensive metabolic panel      Lipid panel   8. Uterine leiomyoma, unspecified location D25.9 218.9 Ambulatory referral to Obstetrics / Gynecology   9. Vaginal bleeding N93.9 623.8 Ambulatory referral to Obstetrics / Gynecology       Patient Instructions   Blood pressures look good on current medications, continue good compliance especially with medications that need to be taken twice daily such as the carvedilol.  Continue potassium supplement.  Labs on a day you are fasting.  If blood pressures and lab work looks good then OK to follow-up in 6 months for annual exam.  A referral has been placed to OB/GYN Dr. Ayleen Crocker to discuss your vaginal bleeding with your history of fibroids.        Follow-up in about 6 months (around 9/14/2018) for annual exam in 6 months, sooner if concerns.

## 2018-03-14 NOTE — PATIENT INSTRUCTIONS
Blood pressures look good on current medications, continue good compliance especially with medications that need to be taken twice daily such as the carvedilol.  Continue potassium supplement.  Labs on a day you are fasting.  If blood pressures and lab work looks good then OK to follow-up in 6 months for annual exam.  A referral has been placed to OB/GYN Dr. Ayleen Crocker to discuss your vaginal bleeding with your history of fibroids.

## 2018-03-17 ENCOUNTER — LAB VISIT (OUTPATIENT)
Dept: LAB | Facility: HOSPITAL | Age: 48
End: 2018-03-17
Attending: FAMILY MEDICINE
Payer: OTHER GOVERNMENT

## 2018-03-17 DIAGNOSIS — R73.03 PREDIABETES: ICD-10-CM

## 2018-03-17 DIAGNOSIS — E87.6 DIURETIC-INDUCED HYPOKALEMIA: ICD-10-CM

## 2018-03-17 DIAGNOSIS — I25.10 CORONARY ARTERY DISEASE INVOLVING NATIVE CORONARY ARTERY OF NATIVE HEART WITHOUT ANGINA PECTORIS: ICD-10-CM

## 2018-03-17 DIAGNOSIS — T50.2X5A DIURETIC-INDUCED HYPOKALEMIA: ICD-10-CM

## 2018-03-17 DIAGNOSIS — E66.9 OBESITY (BMI 30-39.9): ICD-10-CM

## 2018-03-17 LAB
ALBUMIN SERPL BCP-MCNC: 3.3 G/DL
ALP SERPL-CCNC: 33 U/L
ALT SERPL W/O P-5'-P-CCNC: 13 U/L
ANION GAP SERPL CALC-SCNC: 8 MMOL/L
AST SERPL-CCNC: 15 U/L
BILIRUB SERPL-MCNC: 0.7 MG/DL
BUN SERPL-MCNC: 14 MG/DL
CALCIUM SERPL-MCNC: 9.2 MG/DL
CHLORIDE SERPL-SCNC: 107 MMOL/L
CHOLEST SERPL-MCNC: 148 MG/DL
CHOLEST/HDLC SERPL: 3.6 {RATIO}
CO2 SERPL-SCNC: 25 MMOL/L
CREAT SERPL-MCNC: 0.9 MG/DL
EST. GFR  (AFRICAN AMERICAN): >60 ML/MIN/1.73 M^2
EST. GFR  (NON AFRICAN AMERICAN): >60 ML/MIN/1.73 M^2
ESTIMATED AVG GLUCOSE: 128 MG/DL
GLUCOSE SERPL-MCNC: 106 MG/DL
HBA1C MFR BLD HPLC: 6.1 %
HDLC SERPL-MCNC: 41 MG/DL
HDLC SERPL: 27.7 %
LDLC SERPL CALC-MCNC: 95 MG/DL
NONHDLC SERPL-MCNC: 107 MG/DL
POTASSIUM SERPL-SCNC: 3.7 MMOL/L
PROT SERPL-MCNC: 7.1 G/DL
SODIUM SERPL-SCNC: 140 MMOL/L
TRIGL SERPL-MCNC: 60 MG/DL

## 2018-03-17 PROCEDURE — 36415 COLL VENOUS BLD VENIPUNCTURE: CPT | Mod: PO

## 2018-03-17 PROCEDURE — 80061 LIPID PANEL: CPT

## 2018-03-17 PROCEDURE — 80053 COMPREHEN METABOLIC PANEL: CPT

## 2018-03-17 PROCEDURE — 83036 HEMOGLOBIN GLYCOSYLATED A1C: CPT

## 2018-03-18 ENCOUNTER — TELEPHONE (OUTPATIENT)
Dept: FAMILY MEDICINE | Facility: CLINIC | Age: 48
End: 2018-03-18

## 2018-03-18 DIAGNOSIS — E66.9 OBESITY (BMI 30-39.9): ICD-10-CM

## 2018-03-18 DIAGNOSIS — R73.03 PREDIABETES: ICD-10-CM

## 2018-03-18 DIAGNOSIS — E87.6 DIURETIC-INDUCED HYPOKALEMIA: ICD-10-CM

## 2018-03-18 DIAGNOSIS — Z79.899 MEDICATION MANAGEMENT: ICD-10-CM

## 2018-03-18 DIAGNOSIS — I25.10 CORONARY ARTERY DISEASE INVOLVING NATIVE CORONARY ARTERY OF NATIVE HEART WITHOUT ANGINA PECTORIS: Primary | ICD-10-CM

## 2018-03-18 DIAGNOSIS — T50.2X5A DIURETIC-INDUCED HYPOKALEMIA: ICD-10-CM

## 2018-03-18 DIAGNOSIS — I10 ACCELERATED HYPERTENSION: ICD-10-CM

## 2018-03-20 ENCOUNTER — PATIENT MESSAGE (OUTPATIENT)
Dept: FAMILY MEDICINE | Facility: CLINIC | Age: 48
End: 2018-03-20

## 2018-03-21 ENCOUNTER — OFFICE VISIT (OUTPATIENT)
Dept: OBSTETRICS AND GYNECOLOGY | Facility: CLINIC | Age: 48
End: 2018-03-21
Attending: OBSTETRICS & GYNECOLOGY
Payer: OTHER GOVERNMENT

## 2018-03-21 VITALS
WEIGHT: 238.75 LBS | SYSTOLIC BLOOD PRESSURE: 110 MMHG | BODY MASS INDEX: 37.47 KG/M2 | DIASTOLIC BLOOD PRESSURE: 74 MMHG | HEIGHT: 67 IN

## 2018-03-21 DIAGNOSIS — N92.3 INTERMENSTRUAL BLEEDING: Primary | ICD-10-CM

## 2018-03-21 DIAGNOSIS — N95.1 PERIMENOPAUSAL: ICD-10-CM

## 2018-03-21 PROCEDURE — 99999 PR PBB SHADOW E&M-EST. PATIENT-LVL III: CPT | Mod: PBBFAC,,, | Performed by: OBSTETRICS & GYNECOLOGY

## 2018-03-21 PROCEDURE — 99214 OFFICE O/P EST MOD 30 MIN: CPT | Mod: S$PBB,,, | Performed by: OBSTETRICS & GYNECOLOGY

## 2018-03-21 PROCEDURE — 99213 OFFICE O/P EST LOW 20 MIN: CPT | Mod: PBBFAC,PN | Performed by: OBSTETRICS & GYNECOLOGY

## 2018-03-21 NOTE — LETTER
March 21, 2018      Marta Louis MD  [address]  [address]  [place] [address]           [facility]  [address] [place] [address]  [place] [address]  Phone: [phone]  Fax: [phone]          Patient: Lizet Palencia   MR Number: [id number]   YOB: 1970   Date of Visit: 3/21/2018       Dear Dr. Marta Louis:    Thank you for referring Lizet Palencia to me for evaluation. Attached you will find relevant portions of my assessment and plan of care.    If you have questions, please do not hesitate to call me. I look forward to following Lizet Palencia along with you.    Sincerely,    Ang Rodriguez MD    Enclosure  CC:  No Recipients    If you would like to receive this communication electronically, please contact [email or web address] or [phone] to request more information on [facility] access.    For providers and/or their staff who would like to refer a patient to [facility], please contact us through our one-stop-shop provider referral line, [facility], at [phone].    If you feel you have received this communication in error or would no longer like to receive these types of communications, please e-mail [email or web address]

## 2018-03-21 NOTE — LETTER
March 21, 2018      [facility]  [address]  [place] [address]  Phone: [phone]  Fax: [phone]       Patient: Lizet Palencia   YOB: 1970  Date of Visit: 03/21/2018    To Whom It May Concern:    Rohan Palencia  was at [facility] on 03/21/2018.  She may return to work on 3/21/18 no restrictions. If you have any questions or concerns, or if I can be of further assistance, please do not hesitate to contact me.    Sincerely,    Ling Winkler MA

## 2018-03-21 NOTE — PROGRESS NOTES
Chief Complaint   Patient presents with    Endometriosis    Consult       HPI:  Lizet Palencia is a 48 y.o. female patient  who presents today to discuss intermenstrual bleeding.  She is status post endometrial ablation .  Since then, she has been having light / moderate monthly menses, lasting 5 days in duration, which are significantly less than her preoperative periods.  For the past several weeks, she notes vaginal bleeding with bowel movement.  She is confident that this bleeding is not occurring rectally.  Denies constipation.  No pelvic pain.  Her period began 3 days ago and she is now having moderate flow.  Patient's last menstrual period was 2018.     Mammogram 927/17: Negative     Past Medical History:   Diagnosis Date    Accelerated hypertension 2015    Allergic rhinitis 1/3/2015    Coronary artery disease 2016    Coronary artery disease involving native coronary artery of native heart without angina pectoris 2016    Hepatitis C antibody test positive 2016    Negative HCV RNA 2016, will check again in 3 months but no detectable HCV virus HCV AB will likely remain positive for life    History of non-ST elevation myocardial infarction (NSTEMI) 2016    Menorrhagia     Obesity (BMI 30-39.9) 2015    Prediabetes 3/14/2018    Status post coronary artery stent placement 2015    LCx stent         Past Surgical History:   Procedure Laterality Date     SECTION, CLASSIC      x3    CORONARY STENT PLACEMENT      D&C Hysteroscopy      right arm surgery      TUBAL LIGATION           Diagnosis:  1. Intermenstrual bleeding    2. Perimenopausal          PLAN:    Orders Placed This Encounter    US Pelvis Comp with Transvag NON-OB (xpd       Patient was counseled today on her recent intermenstrual bleeding that is occurring with bowel movements.  We reviewed the various etiologies for this abnormal bleeding.  Being on her period today,  pelvic exam was deferred.  She will have pelvic ultrasound performed for evaluation of her endometrium and then return for examination.    Follow-up for ultrasound 3/29/18 then office visit / annual exam 18.    Total time of visit / counselin minutes.

## 2018-03-29 ENCOUNTER — HOSPITAL ENCOUNTER (OUTPATIENT)
Dept: RADIOLOGY | Facility: HOSPITAL | Age: 48
Discharge: HOME | End: 2018-03-29
Attending: OBSTETRICS & GYNECOLOGY
Payer: OTHER GOVERNMENT

## 2018-03-29 DIAGNOSIS — N92.3 INTERMENSTRUAL BLEEDING: ICD-10-CM

## 2018-03-29 PROCEDURE — 76830 TRANSVAGINAL US NON-OB: CPT | Mod: TC

## 2018-03-29 PROCEDURE — 76856 US EXAM PELVIC COMPLETE: CPT | Mod: 26,,, | Performed by: RADIOLOGY

## 2018-03-29 PROCEDURE — 76830 TRANSVAGINAL US NON-OB: CPT | Mod: 26,,, | Performed by: RADIOLOGY

## 2018-04-04 ENCOUNTER — OFFICE VISIT (OUTPATIENT)
Dept: OBSTETRICS AND GYNECOLOGY | Facility: CLINIC | Age: 48
End: 2018-04-04
Attending: OBSTETRICS & GYNECOLOGY
Payer: OTHER GOVERNMENT

## 2018-04-04 VITALS
BODY MASS INDEX: 38.2 KG/M2 | HEIGHT: 67 IN | DIASTOLIC BLOOD PRESSURE: 84 MMHG | WEIGHT: 243.38 LBS | SYSTOLIC BLOOD PRESSURE: 122 MMHG

## 2018-04-04 DIAGNOSIS — N92.3 INTERMENSTRUAL BLEEDING: Primary | ICD-10-CM

## 2018-04-04 DIAGNOSIS — D25.1 INTRAMURAL LEIOMYOMA OF UTERUS: ICD-10-CM

## 2018-04-04 DIAGNOSIS — N95.1 PERIMENOPAUSAL: ICD-10-CM

## 2018-04-04 DIAGNOSIS — Z12.4 PAP SMEAR FOR CERVICAL CANCER SCREENING: ICD-10-CM

## 2018-04-04 PROCEDURE — 88175 CYTOPATH C/V AUTO FLUID REDO: CPT

## 2018-04-04 PROCEDURE — 99213 OFFICE O/P EST LOW 20 MIN: CPT | Mod: PBBFAC,PN | Performed by: OBSTETRICS & GYNECOLOGY

## 2018-04-04 PROCEDURE — 99213 OFFICE O/P EST LOW 20 MIN: CPT | Mod: S$PBB,,, | Performed by: OBSTETRICS & GYNECOLOGY

## 2018-04-04 PROCEDURE — 99999 PR PBB SHADOW E&M-EST. PATIENT-LVL III: CPT | Mod: PBBFAC,,, | Performed by: OBSTETRICS & GYNECOLOGY

## 2018-04-04 NOTE — PROGRESS NOTES
Chief Complaint   Patient presents with    Gynecologic Exam     f/u from US results       HPI:  Lizet Palencia is a 48 y.o. female patient  who presents today to discuss the findings of her recent pelvic ultrasound as well as evaluation of vaginal bleeding with BM.  She is S/P endometrial ablation .  Since then, she has been having light / moderate monthly menses, lasting 5 days in duration, which are significantly less than her preoperative periods.  For several weeks during 2018, she noted what she suspects was vaginal bleeding with bowel movement.  For the past 6 weeks, she has not seen any bleeding with BM.  Denies constipation / diarrhea.  Recent pelvic ultrasound was remarkable for a 2 cm uterine fibroid with a normal appearing ES.    Patient's last menstrual period was 2018 (exact date).     Pelvic sono 3/29/18:  FINDINGS:  Uterus:  Size: 9.8 x 5.5 x 7.1 cm  Masses: Intramural hypoechoic lesion identified measuring 2.2 x 2.1 x 2.2 cm, finding is most consistent with fibroid.  Endometrium: Normal in this pre menopausal patient, measuring 8 mm.  Right ovary:  Size: 3.2 x 2.8 x 3.6 cm  Appearance: Normal with normal sized follicle.  Vascular flow: Normal.  Left ovary:  Size: 5.0 x 2.3 x 2.7 cm  Appearance: Normal normal sized follicle.  Vascular Flow: Normal.  Free Fluid:  None.   Impression   Intramural hypoechoic lesion identified measuring 2.2 x 2.1 x 2.2 cm, finding is most consistent with fibroid.         Past Medical History:   Diagnosis Date    Accelerated hypertension 2015    Allergic rhinitis 1/3/2015    Coronary artery disease 2016    Coronary artery disease involving native coronary artery of native heart without angina pectoris 2016    Hepatitis C antibody test positive 2016    Negative HCV RNA 2016, will check again in 3 months but no detectable HCV virus HCV AB will likely remain positive for life    History of non-ST elevation myocardial  infarction (NSTEMI) 2016    Menorrhagia     Obesity (BMI 30-39.9) 2015    Prediabetes 3/14/2018    Status post coronary artery stent placement 2015    LCx stent         Past Surgical History:   Procedure Laterality Date     SECTION, CLASSIC      x3    CORONARY STENT PLACEMENT      D&C Hysteroscopy      right arm surgery      TUBAL LIGATION           ROS:  GENERAL: Feeling well overall.   SKIN: Denies rash or lesions.   HEAD: Denies head injury or headache.   NODES: Denies enlarged lymph nodes.   CHEST: Denies chest pain or shortness of breath.   CARDIOVASCULAR: Denies palpitations or left sided chest pain.   ABDOMEN: No abdominal pain, nausea, vomiting or rectal bleeding.   URINARY: No dysuria or hematuria.  REPRODUCTIVE: See HPI.   BREASTS: Denies pain, lumps, or nipple discharge.   HEMATOLOGIC: No easy bruisability or excessive bleeding.   MUSCULOSKELETAL: Denies joint pain or swelling.   NEUROLOGIC: Denies syncope or weakness.   PSYCHIATRIC: Denies depression.    PE:   (chaperone present during entire exam)  APPEARANCE: Well nourished, well developed, in no acute distress.  ABDOMEN: Soft. No tenderness or masses.  VULVA: No lesions. Normal female genitalia.  URETHRAL MEATUS: Normal size and location, no lesions, no prolapse.  URETHRA: No masses, tenderness, prolapse or scarring.  VAGINA: No lesions, no abnormal discharge.  CERVIX: No lesions and discharge. Pap  UTERUS: Normal size, retroflexed, non-tender, bladder base nontender.  ADNEXA: No masses, tenderness or CDS nodularity.  ANUS PERINEUM: Normal.    Diagnosis:  1. Intermenstrual bleeding    2. Perimenopausal    3. Intramural leiomyoma of uterus    4. Pap smear for cervical cancer screening          PLAN:    Orders Placed This Encounter    Liquid-based pap smear, screening       Patient was counseled today on ultrasound findings.  Her ES is normal for her premenopausal status.  Likewise the small (2 cm) intramural  fibroid is probably asymptomatic.  She has not had any bleeding with BM for the past 6 weeks.  No specific prominent external hemorrhoids were noted on exam today.  She will monitor and let us know her progress.  For any additional intermenstrual bleeding, she will need evaluation with EMBX.    Follow-up for annual exam.    Total time of visit: 20 minutes (counseling >75% of time)

## 2018-04-04 NOTE — LETTER
April 4, 2018      [facility]  [address] [place] [address]  Phone: [phone]  Fax: [phone]       Patient: Lizet Palencia   YOB: 1970  Date of Visit: 04/04/2018    To Whom It May Concern:    Rohan Palencia  was at [facility] on 04/04/2018. She may return to work on 4/5/18 without restrictions. If you have any questions or concerns, or if I can be of further assistance, please do not hesitate to contact me.    Sincerely,    Dr. Ang Rodriguez, OBGYN

## 2018-04-11 ENCOUNTER — PATIENT MESSAGE (OUTPATIENT)
Dept: OBSTETRICS AND GYNECOLOGY | Facility: CLINIC | Age: 48
End: 2018-04-11

## 2018-05-30 ENCOUNTER — PATIENT MESSAGE (OUTPATIENT)
Dept: FAMILY MEDICINE | Facility: CLINIC | Age: 48
End: 2018-05-30

## 2018-07-12 ENCOUNTER — OFFICE VISIT (OUTPATIENT)
Dept: URGENT CARE | Facility: CLINIC | Age: 48
End: 2018-07-12
Payer: OTHER GOVERNMENT

## 2018-07-12 VITALS
HEART RATE: 68 BPM | DIASTOLIC BLOOD PRESSURE: 78 MMHG | HEIGHT: 67 IN | TEMPERATURE: 98 F | SYSTOLIC BLOOD PRESSURE: 128 MMHG | RESPIRATION RATE: 17 BRPM | BODY MASS INDEX: 38.14 KG/M2 | WEIGHT: 243 LBS | OXYGEN SATURATION: 98 %

## 2018-07-12 DIAGNOSIS — R51.9 SINUS HEADACHE: Primary | ICD-10-CM

## 2018-07-12 PROCEDURE — 99214 OFFICE O/P EST MOD 30 MIN: CPT | Mod: S$GLB,,, | Performed by: NURSE PRACTITIONER

## 2018-07-12 RX ORDER — NAPROXEN SODIUM 220 MG/1
81 TABLET, FILM COATED ORAL DAILY
COMMUNITY

## 2018-07-12 RX ORDER — CETIRIZINE HYDROCHLORIDE 10 MG/1
10 TABLET ORAL NIGHTLY
Qty: 30 TABLET | Refills: 0 | COMMUNITY
Start: 2018-07-12 | End: 2021-04-28 | Stop reason: SDUPTHER

## 2018-07-12 RX ORDER — FLUTICASONE PROPIONATE 50 MCG
2 SPRAY, SUSPENSION (ML) NASAL DAILY
Qty: 1 BOTTLE | Refills: 0 | Status: SHIPPED | OUTPATIENT
Start: 2018-07-12 | End: 2021-03-23 | Stop reason: SDUPTHER

## 2018-07-12 NOTE — PROGRESS NOTES
"Subjective:       Patient ID: Lizet Palencia is a 48 y.o. female.    Vitals:  height is 5' 7" (1.702 m) and weight is 110.2 kg (243 lb). Her oral temperature is 98.1 °F (36.7 °C). Her blood pressure is 128/78 and her pulse is 68. Her respiration is 17 and oxygen saturation is 98%.     Chief Complaint: Headache    Patient presents with c/o frontal sinus headache intermittent over the last few days after waking up for the past few weeks with congestion.  States that she's able to produce the congestion in the morning and it resolves.  She is not on any medications for allergies.  No pain at present.  No purulent drainage.  No fever.  No cough.        Sinus Problem   This is a new problem. The current episode started 1 to 4 weeks ago. The problem has been waxing and waning since onset. There has been no fever. Her pain is at a severity of 0/10. She is experiencing no pain. Associated symptoms include congestion, ear pain and headaches. Pertinent negatives include no chills, coughing, diaphoresis, hoarse voice, neck pain, shortness of breath, sinus pressure, sneezing, sore throat or swollen glands. Past treatments include nothing.     Review of Systems   Constitution: Negative for chills, diaphoresis and fever.   HENT: Positive for congestion and ear pain. Negative for hoarse voice, sinus pressure, sneezing and sore throat.         Bilateral ear pressure   Eyes: Negative for blurred vision.   Cardiovascular: Negative for chest pain.   Respiratory: Positive for sputum production. Negative for cough and shortness of breath.    Skin: Negative for rash.   Musculoskeletal: Negative for back pain, joint pain and neck pain.   Gastrointestinal: Negative for abdominal pain, diarrhea, nausea and vomiting.   Neurological: Positive for headaches.   Psychiatric/Behavioral: The patient is not nervous/anxious.    Allergic/Immunologic: Negative for environmental allergies.       Objective:      Physical Exam   Constitutional: She " is oriented to person, place, and time. Vital signs are normal. She appears well-developed and well-nourished. She is cooperative.  Non-toxic appearance. She does not have a sickly appearance. She does not appear ill. No distress.   HENT:   Head: Normocephalic and atraumatic.   Right Ear: Hearing, external ear and ear canal normal. Tympanic membrane is not erythematous, not retracted and not bulging. A middle ear effusion is present.   Left Ear: Hearing, external ear and ear canal normal. Tympanic membrane is not erythematous, not retracted and not bulging. A middle ear effusion is present.   Nose: Mucosal edema present. No rhinorrhea or nasal deformity. No epistaxis. Right sinus exhibits no maxillary sinus tenderness and no frontal sinus tenderness. Left sinus exhibits no maxillary sinus tenderness and no frontal sinus tenderness.   Mouth/Throat: Uvula is midline and mucous membranes are normal. No trismus in the jaw. Normal dentition. No uvula swelling. Posterior oropharyngeal edema (cobblestone apperance) present. No oropharyngeal exudate or posterior oropharyngeal erythema.   No temporal TTP   Eyes: Conjunctivae, EOM and lids are normal. Pupils are equal, round, and reactive to light. No scleral icterus.   Sclera clear bilat   Neck: Trachea normal, normal range of motion, full passive range of motion without pain and phonation normal. Neck supple. No neck rigidity.   Cardiovascular: Normal rate, regular rhythm, normal heart sounds, intact distal pulses and normal pulses.    Pulmonary/Chest: Effort normal and breath sounds normal. No respiratory distress.   Abdominal: Soft. Normal appearance and bowel sounds are normal. She exhibits no distension. There is no tenderness.   Musculoskeletal: Normal range of motion. She exhibits no edema or deformity.   Lymphadenopathy:     She has no cervical adenopathy.   Neurological: She is alert and oriented to person, place, and time. No cranial nerve deficit. She exhibits  normal muscle tone. Coordination normal.   Skin: Skin is warm, dry and intact. She is not diaphoretic. No pallor.   Psychiatric: She has a normal mood and affect. Her speech is normal and behavior is normal. Judgment and thought content normal. Cognition and memory are normal.   Nursing note and vitals reviewed.      Assessment:       1. Sinus headache        Plan:         Sinus headache  -     fluticasone (FLONASE) 50 mcg/actuation nasal spray; 2 sprays (100 mcg total) by Each Nare route once daily.  Dispense: 1 Bottle; Refill: 0  -     cetirizine (ZYRTEC) 10 MG tablet; Take 1 tablet (10 mg total) by mouth every evening.  Dispense: 30 tablet; Refill: 0      Patient Instructions     Please drink plenty of fluids.  Please get plenty of rest.  Please return here or go to the Emergency Department for any concerns or worsening of condition.  If you do not have Hypertension or any history of palpitations, it is ok to take over the counter Sudafed or Mucinex D or Allegra-D or Claritin-D or Zyrtec-D.  If you do take one of the above, it is ok to combine that with plain over the counter Mucinex or Allegra or Claritin or Zyrtec.  If for example you are taking Zyrtec -D, you can combine that with Mucinex, but not Mucinex-D.  If you are taking Mucinex-D, you can combine that with plain Allegra or Claritin or Zyrtec.   If you do have Hypertension or palpitations, it is safe to take Coricidin HBP for relief of sinus symptoms.  We recommend you take over the counter Flonase (Fluticasone) or another nasally inhaled steroid unless you are already taking one.  Nasal irrigation with a saline spray or Netti Pot like device per their directions is also recommended.  If not allergic, please take over the counter Tylenol (Acetaminophen) and/or Motrin (Ibuprofen) as directed for control of pain and/or fever.  Please follow up with your primary care doctor or specialist as needed.    If you  smoke, please stop smoking.  Sinus Headaches      When using nasal spray, keep your chin down and angle the spray away from center.     Sinus headaches can cause a gnawing pain behind the nose and eyes. The pain most often gets worse in the afternoon and evening. You may also run a fever. Sinus headaches are caused by colds or allergies that make the nasal passages inflamed or infected.  To help prevent sinus headaches:  · Treat colds promptly to keep mucus from backing up.  · Avoid things that trigger sinus problems, such as pollens, dust, smoke, fumes, and strong odors.  · Take allergy medicines as directed by your healthcare provider.  To relieve the pain:  · Keep your sinuses open and free of mucus. Try over-the-counter sinus rinse products.  · Use a nasal decongestant as directed to reduce the inflammation.  · Drink fluids to keep the mucus thinner. This helps it drain more easily. You can also use a humidifier.  · Apply hot packs to the area around your sinuses. Use a hot water bottle.  · See your healthcare provider if your sinus headache lasts more than 2 weeks. You may need medicine for a sinus infection or an exam to check for other headache conditions, like migraines.  Date Last Reviewed: 10/1/2016  © 2600-4212 [employer]. [address] [place] [address], [place] [address]. All rights reserved. This information is not intended as a substitute for professional medical care. Always follow your healthcare professional's instructions.        Sinus Headache    The sinuses are air-filled spaces within the bones of the face. They connect to the inside of the nose. Sinusitis is an inflammation of the tissue lining the sinus cavity. Sinus inflammation can occur during a cold or hay fever (allergies to pollens and other particles in the air) and cause symptoms of sinus congestion and fullness and perhaps a low-grade fever. An infection is usually present when there is also facial pain or headache and green or yellow drainage from the nose or into the back  of the throat (postnasal drip). Antibiotics are often prescribed to treat this condition.  Sinus headache may cause pain in different places, depending on which sinuses are infected. There may be pain in the temples, forehead, top of the head, behind or around the eye, across the cheekbone, or into the upper teeth.  You may find that changing your position, sitting upright or lying down, will bring some relief.  Home care  The following guidelines will help you care for yourself at home:  · Drink plenty of water, hot tea, and other liquids to stay well hydrated. This thins the mucus and helps your sinuses drain.  · Apply heat to the painful areas of the face. Use a towel soaked in hot water. Or  the shower with the hot spray on your face. This is a good way to inhale warm water vapor and get heat on your face at the same time. Cover your mouth and nose with your hands so you can still breathe as you do this.  · Use a cool mist vaporizer at night. Suck on peppermint, menthol, or eucalyptus hard candies during the day.  · An expectorant containing guaifenesin helps thin the mucus. It also helps your sinuses drain.  · You may use over-the-counter decongestants unless a similar medicine was prescribed. Nasal sprays or drops work the fastest. Use one that contains phenylephrine or oxymetazoline. First blow your nose gently to remove mucus. Then apply the spray or drops. Don't use decongestant nasal sprays or drops more often than the label says or for more than 3 days. This can make symptoms worse. Nasal sprays or drops prescribed by your doctor typically do not have these limits. Check with your doctor or pharmacist. You may also use oral tablets containing pseudoephedrine. Side effects from oral decongestants tend to be worse than with nasal sprays or drops, and may keep you from using them. Many sinus remedies combine ingredients, which may increase side effects. Also, if you are taking a combination medicine  with another medicine, be sure you are not taking a double dose of anything by mistake. Read the labels or ask the pharmacist for help. Talk with your doctor before using decongestants if you have high blood pressure, heart disease, glaucoma, or prostate trouble.  · Antihistamines may help if allergies are causing your sinusitis. You can get chlorpheniramine and diphenhydramine over the counter, but these can cause drowsiness. Don't use these if you have glaucoma or if you are a man with trouble urinating due to an enlarged prostate. Over-the-counter antihistamines containing loratidine and cetirizine cause less drowsiness and may be a better choice for daytime use.  · When allergies cause your sinusitis, a saline nasal rinse may give relief. A saline nasal rinse reduces swelling and clears excess mucus. This allows sinuses to drain. Prepackaged kits are available at most drugstores. These contain premixed salt packets and an irrigation device. If antibiotics have been prescribed to treat an acute sinus infection, talk with your doctor before using a nasal rinse to be sure it is safe for you.  · You may use over-the-counter medicine to control pain and fever, unless another pain medicine was prescribed. Talk with your doctor before using acetaminophen or ibuprofen if you have chronic liver or kidney disease. Also talk with your doctor if you have ever had a stomach ulcer. Aspirin should never be used in anyone under 18 years of age who has a fever. It may cause a life-threatening condition called Reye syndrome.  · If antibiotics were given, finish all of them, even if you are feeling better after a few days.  Follow-up care  Follow up with your healthcare provider, or as advised if your symptoms aren't better in 1 week.  Call 911  Call 911 if any of these occur:  · Unusual drowsiness or confusion  · Swelling of the forehead or eyelids  · Vision problems including blurred or double vision  · Seizure  When to seek  medical advice  Call your healthcare provider right away if any of these occur:  · Facial pain or headache becomes more severe  · Stiff neck  · Fever over 100.4º F (38.0º C) for more than 3 days on antibiotics  · Bleeding from the nose or throat  Date Last Reviewed: 10/1/2016  © 6377-4913 [employer]. [address] [place], [place] [id number]. All rights reserved. This information is not intended as a substitute for professional medical care. Always follow your healthcare professional's instructions.

## 2018-07-12 NOTE — PATIENT INSTRUCTIONS
Please drink plenty of fluids.  Please get plenty of rest.  Please return here or go to the Emergency Department for any concerns or worsening of condition.  If you do not have Hypertension or any history of palpitations, it is ok to take over the counter Sudafed or Mucinex D or Allegra-D or Claritin-D or Zyrtec-D.  If you do take one of the above, it is ok to combine that with plain over the counter Mucinex or Allegra or Claritin or Zyrtec.  If for example you are taking Zyrtec -D, you can combine that with Mucinex, but not Mucinex-D.  If you are taking Mucinex-D, you can combine that with plain Allegra or Claritin or Zyrtec.   If you do have Hypertension or palpitations, it is safe to take Coricidin HBP for relief of sinus symptoms.  We recommend you take over the counter Flonase (Fluticasone) or another nasally inhaled steroid unless you are already taking one.  Nasal irrigation with a saline spray or Netti Pot like device per their directions is also recommended.  If not allergic, please take over the counter Tylenol (Acetaminophen) and/or Motrin (Ibuprofen) as directed for control of pain and/or fever.  Please follow up with your primary care doctor or specialist as needed.    If you  smoke, please stop smoking.  Sinus Headaches     When using nasal spray, keep your chin down and angle the spray away from center.     Sinus headaches can cause a gnawing pain behind the nose and eyes. The pain most often gets worse in the afternoon and evening. You may also run a fever. Sinus headaches are caused by colds or allergies that make the nasal passages inflamed or infected.  To help prevent sinus headaches:  · Treat colds promptly to keep mucus from backing up.  · Avoid things that trigger sinus problems, such as pollens, dust, smoke, fumes, and strong odors.  · Take allergy medicines as directed by your healthcare provider.  To relieve the pain:  · Keep your sinuses open and free of mucus. Try over-the-counter sinus  rinse products.  · Use a nasal decongestant as directed to reduce the inflammation.  · Drink fluids to keep the mucus thinner. This helps it drain more easily. You can also use a humidifier.  · Apply hot packs to the area around your sinuses. Use a hot water bottle.  · See your healthcare provider if your sinus headache lasts more than 2 weeks. You may need medicine for a sinus infection or an exam to check for other headache conditions, like migraines.  Date Last Reviewed: 10/1/2016  © 1145-3628 [employer]. [address] [place] [address]. All rights reserved. This information is not intended as a substitute for professional medical care. Always follow your healthcare professional's instructions.        Sinus Headache    The sinuses are air-filled spaces within the bones of the face. They connect to the inside of the nose. Sinusitis is an inflammation of the tissue lining the sinus cavity. Sinus inflammation can occur during a cold or hay fever (allergies to pollens and other particles in the air) and cause symptoms of sinus congestion and fullness and perhaps a low-grade fever. An infection is usually present when there is also facial pain or headache and green or yellow drainage from the nose or into the back of the throat (postnasal drip). Antibiotics are often prescribed to treat this condition.  Sinus headache may cause pain in different places, depending on which sinuses are infected. There may be pain in the temples, forehead, top of the head, behind or around the eye, across the cheekbone, or into the upper teeth.  You may find that changing your position, sitting upright or lying down, will bring some relief.  Home care  The following guidelines will help you care for yourself at home:  · Drink plenty of water, hot tea, and other liquids to stay well hydrated. This thins the mucus and helps your sinuses drain.  · Apply heat to the painful areas of the face. Use a towel soaked in  hot water. Or  the shower with the hot spray on your face. This is a good way to inhale warm water vapor and get heat on your face at the same time. Cover your mouth and nose with your hands so you can still breathe as you do this.  · Use a cool mist vaporizer at night. Suck on peppermint, menthol, or eucalyptus hard candies during the day.  · An expectorant containing guaifenesin helps thin the mucus. It also helps your sinuses drain.  · You may use over-the-counter decongestants unless a similar medicine was prescribed. Nasal sprays or drops work the fastest. Use one that contains phenylephrine or oxymetazoline. First blow your nose gently to remove mucus. Then apply the spray or drops. Don't use decongestant nasal sprays or drops more often than the label says or for more than 3 days. This can make symptoms worse. Nasal sprays or drops prescribed by your doctor typically do not have these limits. Check with your doctor or pharmacist. You may also use oral tablets containing pseudoephedrine. Side effects from oral decongestants tend to be worse than with nasal sprays or drops, and may keep you from using them. Many sinus remedies combine ingredients, which may increase side effects. Also, if you are taking a combination medicine with another medicine, be sure you are not taking a double dose of anything by mistake. Read the labels or ask the pharmacist for help. Talk with your doctor before using decongestants if you have high blood pressure, heart disease, glaucoma, or prostate trouble.  · Antihistamines may help if allergies are causing your sinusitis. You can get chlorpheniramine and diphenhydramine over the counter, but these can cause drowsiness. Don't use these if you have glaucoma or if you are a man with trouble urinating due to an enlarged prostate. Over-the-counter antihistamines containing loratidine and cetirizine cause less drowsiness and may be a better choice for daytime use.  · When  allergies cause your sinusitis, a saline nasal rinse may give relief. A saline nasal rinse reduces swelling and clears excess mucus. This allows sinuses to drain. Prepackaged kits are available at [facility]. These contain premixed salt packets and an irrigation device. If antibiotics have been prescribed to treat an acute sinus infection, talk with your doctor before using a nasal rinse to be sure it is safe for you.  · You may use over-the-counter medicine to control pain and fever, unless another pain medicine was prescribed. Talk with your doctor before using acetaminophen or ibuprofen if you have chronic liver or kidney disease. Also talk with your doctor if you have ever had a stomach ulcer. Aspirin should never be used in anyone under 18 years of age who has a fever. It may cause a life-threatening condition called Reye syndrome.  · If antibiotics were given, finish all of them, even if you are feeling better after a few days.  Follow-up care  Follow up with your healthcare provider, or as advised if your symptoms aren't better in 1 week.  Call 911  Call 911 if any of these occur:  · Unusual drowsiness or confusion  · Swelling of the forehead or eyelids  · Vision problems including blurred or double vision  · Seizure  When to seek medical advice  Call your healthcare provider right away if any of these occur:  · Facial pain or headache becomes more severe  · Stiff neck  · Fever over 100.4º F (38.0º C) for more than 3 days on antibiotics  · Bleeding from the nose or throat  Date Last Reviewed: 10/1/2016  © 5570-4746 [employer]. [address] [place] [address], [place] [address]. All rights reserved. This information is not intended as a substitute for professional medical care. Always follow your healthcare professional's instructions.

## 2018-09-25 ENCOUNTER — OFFICE VISIT (OUTPATIENT)
Dept: URGENT CARE | Facility: CLINIC | Age: 48
End: 2018-09-25
Payer: OTHER MISCELLANEOUS

## 2018-09-25 VITALS
BODY MASS INDEX: 38.14 KG/M2 | HEIGHT: 67 IN | RESPIRATION RATE: 18 BRPM | TEMPERATURE: 99 F | DIASTOLIC BLOOD PRESSURE: 94 MMHG | WEIGHT: 243 LBS | HEART RATE: 74 BPM | SYSTOLIC BLOOD PRESSURE: 142 MMHG | OXYGEN SATURATION: 99 %

## 2018-09-25 DIAGNOSIS — S05.11XA PERIORBITAL CONTUSION OF RIGHT EYE, INITIAL ENCOUNTER: Primary | ICD-10-CM

## 2018-09-25 DIAGNOSIS — Z02.83 ENCOUNTER FOR EMPLOYMENT-RELATED DRUG TESTING: ICD-10-CM

## 2018-09-25 LAB
CTP QC/QA: YES
CTP QC/QA: YES
POC 10 PANEL DRUG SCREEN: NEGATIVE
POC SALIVA ALCOHOL: NEGATIVE

## 2018-09-25 PROCEDURE — 80305 DRUG TEST PRSMV DIR OPT OBS: CPT | Mod: QW,S$GLB,, | Performed by: PHYSICIAN ASSISTANT

## 2018-09-25 PROCEDURE — 99204 OFFICE O/P NEW MOD 45 MIN: CPT | Mod: S$GLB,,, | Performed by: PHYSICIAN ASSISTANT

## 2018-09-25 PROCEDURE — 80320 DRUG SCREEN QUANTALCOHOLS: CPT | Mod: S$GLB,,, | Performed by: PHYSICIAN ASSISTANT

## 2018-09-25 NOTE — PATIENT INSTRUCTIONS
Apply ice, take ibuprofen or tylenol as needed for pain. Return to clinic or report to the emergency room for any new or worsening symptoms.    Please follow up with your primary care provider within 2-5 days if your signs and symptoms have not resolved or worsen.     If your condition worsens or fails to improve we recommend that you receive another evaluation at the emergency room immediately or contact your primary medical clinic to discuss your concerns.   You must understand that you have received an Urgent Care treatment only and that you may be released before all of your medical problems are known or treated. You, the patient, will arrange for follow up care as instructed.         Eye Contusion  A contusion is another word for a bruise. It happens when small blood vessels break open and leak blood into the nearby area. An eye contusion is usually caused by something hitting the eye or nose. You may have pain and swelling around the eye. The skin may also change color (it may be red at first and then darken). For this reason, an eye contusion is often called a black eye.  If needed, imaging tests, such as an X-ray, may be done to help rule out more serious problems.  Pain and swelling should improve within a few days. Bruising may take longer to go away.  Home care  · If you have been prescribed medicines for pain, take them as directed.  · To help reduce swelling and pain for the first day or two, apply a cold pack to the injured eye for up to 20 minutes. Do this as often as directed. You can make an ice pack by filling a plastic bag that seals at the top with ice cubes, and then wrapping it with a thin towel. Never put a cold pack directly on the skin.  Note about concussion  Because the injury was to your face or head, it is possible that a mild brain injury called a concussion could result. You dont have symptoms of a concussion at this time. But they can show up later. For this reason, you may be told  to watch for symptoms of concussion once youre home.   Call 911 if you have any of the symptoms below over the next hours to days:  · Headache  · Nausea or vomiting  · Dizziness  · Sensitivity to light or noise  · Unusual sleepiness or grogginess  · Trouble falling asleep  · Personality changes  · Vision changes  · Memory loss  · Confusion  · Trouble walking or clumsiness  · Loss of consciousness (even for a short time)  · Inability to be awakened   Follow-up care  Follow up with your healthcare provider, or as directed. If imaging tests were done, they may need to be reviewed by a healthcare provider. Youll be told the results and any new findings that may affect your treatment.  When to seek medical advice  Call your healthcare provider right away if any of these occur:   · Pain, bruising, or swelling worsens  · Vision changes, such as seeing small dots or double vision  · Inability to move the eye  · Bleeding on the eyeball surface  Date Last Reviewed: 2/1/2017  © 2125-4206 [employer]. [address] [place] [address], [place] [address]. All rights reserved. This information is not intended as a substitute for professional medical care. Always follow your healthcare professional's instructions.

## 2018-09-25 NOTE — LETTER
September 25, 2018      [facility] [address] [address]  Phone: [phone]  Fax: [phone]       Patient: Lizet Palencia   YOB: 1970  Date of Visit: 09/25/2018    To Whom It May Concern:    Rohan Palencia  was at [facility] on 09/25/2018. She may return to work/school on 9/27/2018 with no restrictions. If you have any questions or concerns, or if I can be of further assistance, please do not hesitate to contact me.    Sincerely,    Marisela Salmeron PA-C

## 2018-09-26 ENCOUNTER — OFFICE VISIT (OUTPATIENT)
Dept: URGENT CARE | Facility: CLINIC | Age: 48
End: 2018-09-26
Payer: OTHER MISCELLANEOUS

## 2018-09-26 VITALS
WEIGHT: 240 LBS | HEIGHT: 67 IN | HEART RATE: 76 BPM | BODY MASS INDEX: 37.67 KG/M2 | SYSTOLIC BLOOD PRESSURE: 134 MMHG | TEMPERATURE: 97 F | DIASTOLIC BLOOD PRESSURE: 80 MMHG

## 2018-09-26 DIAGNOSIS — S05.11XA ORBITAL CONTUSION, RIGHT, INITIAL ENCOUNTER: Primary | ICD-10-CM

## 2018-09-26 DIAGNOSIS — G44.311 INTRACTABLE ACUTE POST-TRAUMATIC HEADACHE: ICD-10-CM

## 2018-09-26 PROCEDURE — 99214 OFFICE O/P EST MOD 30 MIN: CPT | Mod: S$GLB,,, | Performed by: NURSE PRACTITIONER

## 2018-09-26 PROCEDURE — 70200 X-RAY EXAM OF EYE SOCKETS: CPT | Mod: FY,S$GLB,, | Performed by: RADIOLOGY

## 2018-09-26 RX ORDER — ETODOLAC 400 MG/1
400 TABLET, FILM COATED ORAL 2 TIMES DAILY
Qty: 30 TABLET | Refills: 1 | Status: SHIPPED | OUTPATIENT
Start: 2018-09-26 | End: 2018-12-05

## 2018-09-26 NOTE — LETTER
[facility] [place] [address]  [place] [address]  Phone: [phone]  Fax: [phone]    Pt Name: Lizet Salazar Date: 09/24/2018   Employee ID:  Date of Treatment: 09/26/2018   Company:[employer]      Appointment Time: NONE Arrived: 1255   Provider: Michael Alfonso NP Time Out:1440     Office Treatment:   EXAM  XRAYS  HOME TODAY  DISABLED UNTIL NEXT OFFICE VISIT    1. Orbital contusion, right, initial encounter    2. Intractable acute post-traumatic headache      Medications Ordered This Encounter   Medications    etodolac (LODINE) 400 MG tablet      Patient Instructions: Attention not to aggravate affected area, Apply ice 24-48 hours then apply heat/warm soaks    Restrictions: Home today, Disabled until next office visit     Return Appointment: 9/28/2018 at 1130 AM

## 2018-09-26 NOTE — PROGRESS NOTES
Subjective:       Patient ID: Lizet Palencia is a 48 y.o. female.    Chief Complaint: Headache (9/24/18)    Pt works for [employer] as a . Pt states one of the students was playing in the hallway at school on 9/24/18 and when he turned around he head budd  her in the forehead on the right side ans she have been having bad headaches since. Pt state she was seen at [facility] after incident.  IJ       Headache    This is a new problem. The current episode started in the past 7 days. The problem occurs constantly. The problem has been unchanged. The pain is located in the right unilateral region. The pain does not radiate. The pain quality is not similar to prior headaches. The quality of the pain is described as aching and sharp. The pain is at a severity of 7/10. The pain is moderate. Associated symptoms include blurred vision. Pertinent negatives include no abdominal pain, back pain, coughing, dizziness, ear pain, fever, hearing loss, nausea, neck pain, numbness, photophobia, seizures, sore throat, tinnitus, vomiting or weakness. Exacerbated by: BLINKING. She has tried cold packs for the symptoms. The treatment provided no relief.     Review of Systems   Constitution: Negative for chills, fever, weakness and malaise/fatigue.   HENT: Negative for congestion, ear pain, hearing loss, nosebleeds, sore throat and tinnitus.    Eyes: Positive for blurred vision. Negative for double vision and photophobia.        Visual acuity wnl    Cardiovascular: Negative for chest pain and dyspnea on exertion.   Respiratory: Negative for cough and shortness of breath.    Endocrine: Negative for polydipsia, polyphagia and polyuria.   Hematologic/Lymphatic: Negative for adenopathy. Does not bruise/bleed easily.   Skin: Negative for color change and rash.   Musculoskeletal: Negative for back pain, joint pain, muscle cramps, neck pain and stiffness.   Gastrointestinal: Negative for abdominal pain, diarrhea,  nausea and vomiting.   Neurological: Positive for headaches. Negative for disturbances in coordination, dizziness, numbness and seizures.   Psychiatric/Behavioral: Negative for altered mental status. The patient is not nervous/anxious.        Objective:      Physical Exam   Constitutional: She is oriented to person, place, and time. She appears well-developed and well-nourished.   HENT:   Head: Normocephalic. Head is with contusion. Head is without raccoon's eyes, without Gamboa's sign and without abrasion.       Right Ear: Hearing, tympanic membrane, external ear and ear canal normal.   Left Ear: Hearing, tympanic membrane, external ear and ear canal normal.   Nose: Nose normal.   Mouth/Throat: Oropharynx is clear and moist and mucous membranes are normal.   Eyes: Conjunctivae and EOM are normal. Pupils are equal, round, and reactive to light. Right eye exhibits no discharge. Left eye exhibits no discharge. No scleral icterus.   Neck: Trachea normal and normal range of motion. Muscular tenderness present.   Cardiovascular: Normal rate and regular rhythm.   Pulmonary/Chest: Effort normal and breath sounds normal.   Abdominal: Soft. Bowel sounds are normal.   Musculoskeletal: She exhibits tenderness.        Cervical back: She exhibits tenderness. She exhibits normal range of motion, no bony tenderness, no swelling, no pain and no spasm.        Back:    Lymphadenopathy:     She has no cervical adenopathy.   Neurological: She is alert and oriented to person, place, and time. She displays normal reflexes. No cranial nerve deficit or sensory deficit. She exhibits normal muscle tone. Coordination normal. GCS eye subscore is 4. GCS verbal subscore is 5. GCS motor subscore is 6.   Skin: Skin is warm, dry and intact. Capillary refill takes less than 2 seconds.   Psychiatric: She has a normal mood and affect. Her behavior is normal.   Vitals reviewed.      Assessment:       1. Orbital contusion, right, initial encounter    2.  Intractable acute post-traumatic headache        Plan:       Lizet was seen today for headache.    Diagnoses and all orders for this visit:    Orbital contusion, right, initial encounter  -     X-Ray Orbits  Min 4 Views; Future  -     etodolac (LODINE) 400 MG tablet; Take 1 tablet (400 mg total) by mouth 2 (two) times daily. Take with food    Intractable acute post-traumatic headache  -     X-Ray Orbits  Min 4 Views; Future  -     etodolac (LODINE) 400 MG tablet; Take 1 tablet (400 mg total) by mouth 2 (two) times daily. Take with food    Okay to alternate between Lodine and over the counter Excedrin as directed and discussed  Medications Ordered This Encounter   Medications    etodolac (LODINE) 400 MG tablet     Sig: Take 1 tablet (400 mg total) by mouth 2 (two) times daily. Take with food     Dispense:  30 tablet     Refill:  1     Patient Instructions: Attention not to aggravate affected area, Apply ice 24-48 hours then apply heat/warm soaks   Restrictions: Home today, Disabled until next office visit  Follow-up in about 2 days (around 9/28/2018), or if symptoms worsen or fail to improve.

## 2018-09-26 NOTE — PATIENT INSTRUCTIONS
Eye Contusion  A contusion is another word for a bruise. It happens when small blood vessels break open and leak blood into the nearby area. An eye contusion is usually caused by something hitting the eye or nose. You may have pain and swelling around the eye. The skin may also change color (it may be red at first and then darken). For this reason, an eye contusion is often called a black eye.  If needed, imaging tests, such as an X-ray, may be done to help rule out more serious problems.  Pain and swelling should improve within a few days. Bruising may take longer to go away.  Home care  · If you have been prescribed medicines for pain, take them as directed.  · To help reduce swelling and pain for the first day or two, apply a cold pack to the injured eye for up to 20 minutes. Do this as often as directed. You can make an ice pack by filling a plastic bag that seals at the top with ice cubes, and then wrapping it with a thin towel. Never put a cold pack directly on the skin.  Note about concussion  Because the injury was to your face or head, it is possible that a mild brain injury called a concussion could result. You dont have symptoms of a concussion at this time. But they can show up later. For this reason, you may be told to watch for symptoms of concussion once youre home.   Call 911 if you have any of the symptoms below over the next hours to days:  · Headache  · Nausea or vomiting  · Dizziness  · Sensitivity to light or noise  · Unusual sleepiness or grogginess  · Trouble falling asleep  · Personality changes  · Vision changes  · Memory loss  · Confusion  · Trouble walking or clumsiness  · Loss of consciousness (even for a short time)  · Inability to be awakened   Follow-up care  Follow up with your healthcare provider, or as directed. If imaging tests were done, they may need to be reviewed by a healthcare provider. Youll be told the results and any new findings that may affect your treatment.  When  to seek medical advice  Call your healthcare provider right away if any of these occur:   · Pain, bruising, or swelling worsens  · Vision changes, such as seeing small dots or double vision  · Inability to move the eye  · Bleeding on the eyeball surface  Date Last Reviewed: 2/1/2017  © 8462-4733 [employer]. [address] [place] [address], [place] [address]. All rights reserved. This information is not intended as a substitute for professional medical care. Always follow your healthcare professional's instructions.        Self-Care for Headaches  Most headaches aren't serious and can be relieved with self-care. But some headaches may be a sign of another health problem like eye trouble or high blood pressure. To find the best treatment, learn what kind of headaches you get. For tension headaches, self-care will usually help. To treat migraines, ask your healthcare provider for advice. It is also possible to get both tension and migraine headaches. Self-care involves relieving the pain and avoiding headache triggers if you can.    Ways to reduce pain and tension  Try these steps:  · Apply a cold compress or ice pack to the pain site.  · Drink fluids. If nausea makes it hard to drink, try sucking on ice.  · Rest. Protect yourself from bright light and loud noises.  · Calm your emotions by imagining a peaceful scene.  · Massage tight neck, shoulder, and head muscles.  · To relax muscles, soak in a hot bath or use a hot shower.  Use medicines  Aspirin or aspirin substitutes, such as ibuprofen and acetaminophen, can relieve headache. Remember: Never give aspirin to anyone 18 years old or younger because of the risk of developing Reye syndrome. Use pain medicines only when necessary.  Track your headaches  Keeping a headache diary can help you and your healthcare provider identify what's causing your headaches:  · Note when each headache happens.  · Identify your activities and the foods you've eaten 6 to 8 hours  "before the headache began.  · Look for any trends or "triggers."  Signs of tension headache  Any of the following can be signs:  · Dull pain or feeling of pressure in a tight band around your head  · Pain in your neck or shoulders  · Headache without a definite beginning or end  · Headache after an activity such as driving or working on a computer  Signs of migraine  Any of the following can be signs:  · Throbbing pain on one or both sides of your head  · Nausea or vomiting  · Extreme sensitivity to light, sound, and smells  · Bright spots, flashes, or other visual changes  · Pain or nausea so severe that you can't continue your daily activities  Call your healthcare provider   If you have any of the following symptoms, contact your healthcare provider:  · A headache that lingers after a recent injury or bump to the head.  · A fever with a stiff neck or pain when you bend your head toward your chest.  · A headache along with slurred speech, changes in your vision, or numbness or weakness in your arms or legs.  · A headache for longer than 3 days.  · Frequent headaches, especially in the morning.  · Headaches with seizures   · Seek immediate medical attention if you have a headache that you would call "the worst headache you have ever had."   Date Last Reviewed: 10/4/2015  © 9144-8788 [employer]. [address] [place] [address], [place] [address]. All rights reserved. This information is not intended as a substitute for professional medical care. Always follow your healthcare professional's instructions.        "

## 2018-09-28 ENCOUNTER — OFFICE VISIT (OUTPATIENT)
Dept: URGENT CARE | Facility: CLINIC | Age: 48
End: 2018-09-28
Payer: OTHER MISCELLANEOUS

## 2018-09-28 DIAGNOSIS — S05.11XD: ICD-10-CM

## 2018-09-28 DIAGNOSIS — S05.11XA ORBITAL CONTUSION, RIGHT, INITIAL ENCOUNTER: Primary | ICD-10-CM

## 2018-09-28 PROCEDURE — 99213 OFFICE O/P EST LOW 20 MIN: CPT | Mod: S$GLB,,, | Performed by: NURSE PRACTITIONER

## 2018-09-28 NOTE — PROGRESS NOTES
Subjective:       Patient ID: Lizet Palencia is a 48 y.o. female.    Chief Complaint: Headache (9/24/18)    Pt returned to the clinic today for her headaches. Pt states they have improved and she only took her medication once because she did not like the side affects she read about it. IJ  REPORTS HER PAIN IS A 3 TODAY COMPARED TO 8 LAST VISIT-DMG      Headache    This is a recurrent problem. The current episode started in the past 7 days. The problem occurs daily. The problem has been rapidly improving. The pain does not radiate. The quality of the pain is described as aching. The pain is at a severity of 3/10. The pain is mild. Pertinent negatives include no abdominal pain, back pain, blurred vision, coughing, dizziness, fever, nausea, numbness, photophobia, sore throat or vomiting. Nothing aggravates the symptoms. She has tried NSAIDs for the symptoms. The treatment provided mild relief.     Review of Systems   Constitution: Negative for chills and fever.   HENT: Negative for congestion and sore throat.    Eyes: Negative for blurred vision, discharge, double vision and photophobia.   Cardiovascular: Negative for chest pain, claudication and dyspnea on exertion.   Respiratory: Negative for cough and shortness of breath.    Endocrine: Negative for polydipsia, polyphagia and polyuria.   Skin: Negative for color change, dry skin and rash.   Musculoskeletal: Negative for back pain, joint pain and stiffness.   Gastrointestinal: Negative for abdominal pain, diarrhea, nausea and vomiting.   Neurological: Positive for headaches. Negative for difficulty with concentration, disturbances in coordination, dizziness, focal weakness, light-headedness, numbness and sensory change.   Psychiatric/Behavioral: Negative for altered mental status. The patient is not nervous/anxious.        Objective:      Physical Exam   Constitutional: She is oriented to person, place, and time. She appears well-developed and well-nourished.    HENT:   Head: Not macrocephalic and not microcephalic. Head is with contusion. Head is without raccoon's eyes, without Gamboa's sign, without right periorbital erythema and without left periorbital erythema.       Eyes: Conjunctivae and EOM are normal. Pupils are equal, round, and reactive to light.   Neck: Normal range of motion. Neck supple.   Cardiovascular: Normal rate and regular rhythm.   Pulmonary/Chest: Effort normal and breath sounds normal.   Abdominal: Soft. Bowel sounds are normal.   Musculoskeletal: Normal range of motion.   Neurological: She is alert and oriented to person, place, and time. She has normal strength. No cranial nerve deficit or sensory deficit. She displays a negative Romberg sign. Coordination and gait normal. GCS eye subscore is 4. GCS verbal subscore is 5. GCS motor subscore is 6.   Skin: Skin is warm, dry and intact.   Psychiatric: She has a normal mood and affect. Her behavior is normal.       Assessment:       1. Orbital contusion, right, initial encounter    2. Orbital contusion, right, subsequent encounter        Plan:       Lizet was seen today for headache.    Diagnoses and all orders for this visit:    Orbital contusion, right, initial encounter    Orbital contusion, right, subsequent encounter    AVOID STRENUOUS ACTIVITY       Patient Instructions: Daily home exercises/warm soaks, Attention not to aggravate affected area   Restrictions: Avoid climbing/kneeling/squatting, No lifting/pushing/pulling more than 10 lbs, Avoid frequent bending/lifting/twisting(light duty will the following restrictions as listed)  Follow-up in about 4 days (around 10/2/2018).

## 2018-09-28 NOTE — LETTER
[facility] [place]  [address]  [place] [address]  Phone: [phone]  Fax: [phone]    Pt Name: Lizet Palencia  Injury Date: 09/24/2018   Employee ID:  Date of Treatment: 09/28/2018   Company: [employer]      Appointment Time: 11:15 AM Arrived: 1145   Provider: Michael Alfonso NP Time Out:1300 PM     Office Treatment  EXAM  LIGHT DUTY     1. Orbital contusion, right, initial encounter    2. Orbital contusion, right, subsequent encounter          Patient Instructions: Daily home exercises/warm soaks, Attention not to aggravate affected area      Restrictions: Avoid climbing/kneeling/squatting, No lifting/pushing/pulling more than 10 lbs, Avoid frequent bending/lifting/twisting(light duty will the following restrictions as listed)     Return Appointment: 10/2/2018 at 3:00 PM

## 2018-09-28 NOTE — PATIENT INSTRUCTIONS
Eye Contusion  A contusion is another word for a bruise. It happens when small blood vessels break open and leak blood into the nearby area. An eye contusion is usually caused by something hitting the eye or nose. You may have pain and swelling around the eye. The skin may also change color (it may be red at first and then darken). For this reason, an eye contusion is often called a black eye.  If needed, imaging tests, such as an X-ray, may be done to help rule out more serious problems.  Pain and swelling should improve within a few days. Bruising may take longer to go away.  Home care  · If you have been prescribed medicines for pain, take them as directed.  · To help reduce swelling and pain for the first day or two, apply a cold pack to the injured eye for up to 20 minutes. Do this as often as directed. You can make an ice pack by filling a plastic bag that seals at the top with ice cubes, and then wrapping it with a thin towel. Never put a cold pack directly on the skin.  Note about concussion  Because the injury was to your face or head, it is possible that a mild brain injury called a concussion could result. You dont have symptoms of a concussion at this time. But they can show up later. For this reason, you may be told to watch for symptoms of concussion once youre home.   Call 911 if you have any of the symptoms below over the next hours to days:  · Headache  · Nausea or vomiting  · Dizziness  · Sensitivity to light or noise  · Unusual sleepiness or grogginess  · Trouble falling asleep  · Personality changes  · Vision changes  · Memory loss  · Confusion  · Trouble walking or clumsiness  · Loss of consciousness (even for a short time)  · Inability to be awakened   Follow-up care  Follow up with your healthcare provider, or as directed. If imaging tests were done, they may need to be reviewed by a healthcare provider. Youll be told the results and any new findings that may affect your treatment.  When  to seek medical advice  Call your healthcare provider right away if any of these occur:   · Pain, bruising, or swelling worsens  · Vision changes, such as seeing small dots or double vision  · Inability to move the eye  · Bleeding on the eyeball surface  Date Last Reviewed: 2/1/2017  © 5953-2887 [employer]. [address] [place] [address], [place] [address]. All rights reserved. This information is not intended as a substitute for professional medical care. Always follow your healthcare professional's instructions.

## 2018-10-02 ENCOUNTER — OFFICE VISIT (OUTPATIENT)
Dept: URGENT CARE | Facility: CLINIC | Age: 48
End: 2018-10-02
Payer: OTHER MISCELLANEOUS

## 2018-10-02 DIAGNOSIS — S05.11XD: Primary | ICD-10-CM

## 2018-10-02 DIAGNOSIS — G44.311 INTRACTABLE ACUTE POST-TRAUMATIC HEADACHE: ICD-10-CM

## 2018-10-02 PROCEDURE — 99213 OFFICE O/P EST LOW 20 MIN: CPT | Mod: S$GLB,,, | Performed by: NURSE PRACTITIONER

## 2018-10-02 NOTE — LETTER
[facility] [place]  [facility]  [place] [address]  Phone: [phone]  Fax: [phone]    Pt Name: Lizet Palencia  Injury Date: 09/24/2018   Employee ID:  Date of Treatment: 10/02/2018   Company: [employer]      Appointment Time: 14:45 PM Arrived: 1355 PM   Provider: Michael Alfonso NP Time Out:1435 PM     Office Treatment:     EXAM  LIGHT DUTY    1. Orbital contusion, right, subsequent encounter    2. Intractable acute post-traumatic headache          Patient Instructions: Attention not to aggravate affected area(increase activity as tolerated. Medication as directed and needed)    Restrictions: Avoid frequent bending/lifting/twisting, No lifting/pushing/pulling more than 25 lbs     Return Appointment: 10/9/2018 at 1430 PM

## 2018-10-02 NOTE — PATIENT INSTRUCTIONS
"  Headache, Unspecified    A number of things can cause headaches. The cause of your headache isnt clear. But it doesnt seem to be a sign of any serious illness.  You could have a tension headache or a migraine headache.  Stress can cause a tension headache. This can happen if you tense the muscles of your shoulders, neck, and scalp without knowing it. If this stress lasts long enough, you may develop a tension headache.  It is not clear why migraines occur, but certain things called" triggers" can raise the risk of having a migraine attack. Migraine triggers may include emotional stress or depression, or by hormone changes during the menstrual cycle. Other triggers include birth control pills and other medicines, alcohol or caffeine, foods with tyramine (such as aged cheese, wine), eyestrain, weather changes, missed meals, and lack of sleep or oversleeping.  Other causes of headache include:  · Viral illness with high fever  · Head injury with concussion  · Sinus, ear, or throat infection  · Dental pain and jaw joint (TMJ) pain  More serious but less common causes of headache include stroke, brain hemorrhage, brain tumor, meningitis, and encephalitis.  Home care  Follow these tips when taking care of yourself at home:  · Dont drive yourself home if you were given pain medicine for your headache. Instead, have someone else drive you home. Try to sleep when you get home. You should feel much better when you wake up.  · Apply heat to the back of your neck to ease a neck muscle spasm. Take care of a migraine headache by putting an ice pack on your forehead or at the base of your skull.  · If you have nausea or vomiting, eat a light diet until your headache eases.  · If you have a migraine headache, use sunglasses when in the daylight or around bright indoor lighting until your symptoms get better. Bright glaring light can make this type of headache worse.  Follow-up care  Follow up with your healthcare provider, or " as advised. Talk with your provider if you have frequent headaches. He or she can help figure out a treatment plan. By knowing the earliest signs of headache, and starting treatment right away, you may be able to stop the pain yourself.  When to seek medical advice  Call your healthcare provider right away if any of these occur:  · Your head pain suddenly gets worse after sexual intercourse or strenuous activity  · Your head pain doesnt get better within 24 hours  · You arent able to keep liquids down (repeated vomiting)  · Fever of 100.4ºF (38ºC) or higher, or as directed by your healthcare provider  · Stiff neck  · Extreme drowsiness, confusion, or fainting  · Dizziness or dizziness with spinning sensation (vertigo)  · Weakness in an arm or leg or one side of your face  · You have trouble talking or seeing  Date Last Reviewed: 8/1/2016  © 5808-1653 [employer]. [address] [place] [address]. All rights reserved. This information is not intended as a substitute for professional medical care. Always follow your healthcare professional's instructions.        Soft Tissue Contusion  You have a contusion. This is also called a bruise. There is swelling and some bleeding under the skin. This injury generally takes a few days to a few weeks to heal.  During that time, the bruise will typically change in color from reddish, to purple-blue, to greenish-yellow, then to yellow-brown.  Home care  · Elevate the injured area to reduce pain and swelling. As much as possible, sit or lie down with the injured area raised about the level of your heart. This is especially important during the first 48 hours.  · Ice the injured area to help reduce pain and swelling. Wrap a cold source (ice pack or ice cubes in a plastic bag) in a thin towel. Apply to the bruised area for 20 minutes every 1 to 2 hours the first day. Continue this 3 to 4 times a day until the pain and swelling goes away.  · Unless another  medication was prescribed, you can take acetaminophen, ibuprofen, or naproxen to control pain. (If you have chronic liver or kidney disease or ever had a stomach ulcer or GI bleeding, talk with your doctor before using these medicines.)  Follow up  Follow up with your health care provider or our staff as advised. Call if you are not better in 1 to 2 weeks.  When to seek medical advice   Call your health care provider right away if you have any of the following:  · Increased pain or swelling  · Bruise is on an arm or leg and arm or leg becomes cold, blue, numb or tingly  · Signs of infection: Warmth, drainage, or increased redness or pain around the contusion  · Inability to move the injured area or body part   · Bruise is near your eye and you have problems with your eyesight or eye   · Frequent bruising for unknown reasons  Date Last Reviewed: 4/29/2015  © 7262-4432 [employer]. [address] [place] [address], [place] [address]. All rights reserved. This information is not intended as a substitute for professional medical care. Always follow your healthcare professional's instructions.

## 2018-10-02 NOTE — PROGRESS NOTES
Subjective:       Patient ID: Lizet Palencia is a 48 y.o. female.    Chief Complaint: Headache (9/26/18)    Pt returned to the clinic today for her headaches and eye lid contusion. Pt states they have improved. IJ        Headache    This is a recurrent problem. The current episode started 1 to 4 weeks ago. The problem occurs daily. The problem has been rapidly improving. The pain does not radiate. The quality of the pain is described as aching. The pain is at a severity of 2/10. The pain is mild. Pertinent negatives include no abdominal pain, back pain, blurred vision, coughing, dizziness, fever, loss of balance, nausea, numbness, photophobia, sore throat or vomiting. Nothing aggravates the symptoms. She has tried NSAIDs for the symptoms. The treatment provided mild relief.     Review of Systems   Constitution: Negative for chills and fever.   HENT: Negative for congestion and sore throat.    Eyes: Negative for blurred vision, discharge, double vision and photophobia.   Cardiovascular: Negative for chest pain, claudication and dyspnea on exertion.   Respiratory: Negative for cough and shortness of breath.    Endocrine: Negative for polydipsia, polyphagia and polyuria.   Skin: Negative for color change, dry skin and rash.   Musculoskeletal: Negative for back pain, joint pain and stiffness.   Gastrointestinal: Negative for abdominal pain, diarrhea, nausea and vomiting.   Neurological: Positive for headaches. Negative for difficulty with concentration, disturbances in coordination, dizziness, focal weakness, light-headedness, loss of balance, numbness, paresthesias and sensory change.   Psychiatric/Behavioral: Negative for altered mental status. The patient is not nervous/anxious.        Objective:      Physical Exam   Constitutional: She is oriented to person, place, and time. She appears well-developed and well-nourished.   HENT:   Head: Normocephalic. Head is with contusion. Head is without raccoon's eyes,  without Gamboa's sign, without right periorbital erythema and without left periorbital erythema.       Right Ear: Hearing and external ear normal.   Left Ear: Hearing and external ear normal.   Nose: Nose normal.   Mouth/Throat: Oropharynx is clear and moist.   Eyes: Conjunctivae, EOM and lids are normal. Pupils are equal, round, and reactive to light.   Neck: Normal range of motion. Neck supple. No muscular tenderness present. No neck rigidity. No edema present.   Cardiovascular: Normal rate and regular rhythm.   Pulmonary/Chest: Effort normal and breath sounds normal.   Abdominal: Soft. Bowel sounds are normal.   Musculoskeletal: Normal range of motion.   Neurological: She is alert and oriented to person, place, and time. She has normal strength and normal reflexes. No cranial nerve deficit or sensory deficit. She displays a negative Romberg sign. Coordination and gait normal. GCS eye subscore is 4. GCS verbal subscore is 5. GCS motor subscore is 6.   Neuro intact   Skin: Skin is warm and dry.   Psychiatric: She has a normal mood and affect. Her behavior is normal.       Assessment:       1. Orbital contusion, right, subsequent encounter    2. Intractable acute post-traumatic headache        Plan:       Lizet was seen today for headache.    Diagnoses and all orders for this visit:    Orbital contusion, right, subsequent encounter    Intractable acute post-traumatic headache         Patient Instructions: Attention not to aggravate affected area(increase activity as tolerated)   Restrictions: Avoid frequent bending/lifting/twisting, No lifting/pushing/pulling more than 25 lbs  Follow-up in about 7 days (around 10/9/2018).

## 2018-10-04 ENCOUNTER — TELEPHONE (OUTPATIENT)
Dept: FAMILY MEDICINE | Facility: CLINIC | Age: 48
End: 2018-10-04

## 2018-10-04 DIAGNOSIS — Z12.31 ENCOUNTER FOR SCREENING MAMMOGRAM FOR BREAST CANCER: Primary | ICD-10-CM

## 2018-10-04 NOTE — TELEPHONE ENCOUNTER
Pt is scheduled for annual on 10/31. She is requesting to have labs and mammo prior to appt. Please advise.

## 2018-10-04 NOTE — TELEPHONE ENCOUNTER
----- Message from Edie Bardales sent at 10/4/2018  3:15 PM CDT -----  Contact: [phone]/SELF  Patient requesting orders for a mammogram. Please advise.

## 2018-10-08 ENCOUNTER — HOSPITAL ENCOUNTER (OUTPATIENT)
Dept: RADIOLOGY | Facility: HOSPITAL | Age: 48
Discharge: HOME | End: 2018-10-08
Attending: FAMILY MEDICINE
Payer: OTHER GOVERNMENT

## 2018-10-08 DIAGNOSIS — Z12.31 ENCOUNTER FOR SCREENING MAMMOGRAM FOR BREAST CANCER: ICD-10-CM

## 2018-10-08 PROCEDURE — 77063 BREAST TOMOSYNTHESIS BI: CPT | Mod: TC,PO

## 2018-10-08 PROCEDURE — 77067 SCR MAMMO BI INCL CAD: CPT | Mod: TC,PO

## 2018-10-09 ENCOUNTER — OFFICE VISIT (OUTPATIENT)
Dept: URGENT CARE | Facility: CLINIC | Age: 48
End: 2018-10-09
Payer: OTHER MISCELLANEOUS

## 2018-10-09 DIAGNOSIS — G44.311 INTRACTABLE ACUTE POST-TRAUMATIC HEADACHE: ICD-10-CM

## 2018-10-09 DIAGNOSIS — S05.11XD: Primary | ICD-10-CM

## 2018-10-09 PROCEDURE — 99213 OFFICE O/P EST LOW 20 MIN: CPT | Mod: S$GLB,,, | Performed by: NURSE PRACTITIONER

## 2018-10-09 NOTE — PROGRESS NOTES
"Subjective:       Patient ID: Lizet Palencia is a 48 y.o. female.    Chief Complaint: Headache    Patient is a follow up for headache since 9/24/18. Pain is 1/10. Working regular duty, improved, quit pain medicine last week. Patient states " Everything feels fine, need release to regular duty". Ambulatory. MJB      Headache    This is a recurrent problem. The current episode started 1 to 4 weeks ago. The problem occurs intermittently. The problem has been gradually improving. The pain does not radiate. The pain quality is similar to prior headaches. The quality of the pain is described as aching. The pain is at a severity of 1/10. The pain is mild. Pertinent negatives include no abdominal pain, back pain, blurred vision, coughing, dizziness, fever, loss of balance, nausea, numbness, photophobia, seizures, sore throat, vomiting or weakness. Nothing aggravates the symptoms. The treatment provided moderate relief.     Review of Systems   Constitution: Negative for chills, fever and weakness.   HENT: Negative for sore throat.    Eyes: Negative for blurred vision, double vision, photophobia and visual halos.   Cardiovascular: Negative for chest pain.   Respiratory: Negative for cough and shortness of breath.    Endocrine: Negative for polydipsia, polyphagia and polyuria.   Skin: Negative for color change and rash.   Musculoskeletal: Negative.  Negative for back pain, joint pain, muscle weakness and stiffness.   Gastrointestinal: Negative for abdominal pain, diarrhea, nausea and vomiting.   Genitourinary: Negative for bladder incontinence, flank pain and frequency.   Neurological: Negative for difficulty with concentration, disturbances in coordination, dizziness, headaches, loss of balance, numbness, paresthesias, seizures, sensory change and vertigo.   Psychiatric/Behavioral: Negative for altered mental status and depression. The patient is not nervous/anxious.    Allergic/Immunologic: Negative for environmental " allergies and persistent infections.   All other systems reviewed and are negative.      Objective:      Physical Exam   Constitutional: She is oriented to person, place, and time. She appears well-developed and well-nourished.   HENT:   Head: Normocephalic. Head is with contusion (resolved).   Right Ear: Hearing and external ear normal.   Left Ear: Hearing and external ear normal.   Nose: Nose normal.   Mouth/Throat: Oropharynx is clear and moist.   Eyes: Conjunctivae, EOM and lids are normal. Pupils are equal, round, and reactive to light.   Neck: Trachea normal and normal range of motion. Neck supple.   Cardiovascular: Normal rate and regular rhythm.   Pulmonary/Chest: Effort normal and breath sounds normal.   Abdominal: Soft. Bowel sounds are normal.   Musculoskeletal: Normal range of motion.        Cervical back: Normal.   Neurological: She is alert and oriented to person, place, and time. She has normal strength. No cranial nerve deficit or sensory deficit. She displays a negative Romberg sign. Coordination and gait normal. GCS eye subscore is 4. GCS verbal subscore is 5. GCS motor subscore is 6.   Skin: Skin is warm, dry and intact.   Psychiatric: She has a normal mood and affect. Her behavior is normal. Judgment and thought content normal.       Assessment:       1. Orbital contusion, right, subsequent encounter    2. Intractable acute post-traumatic headache        Plan:           Lizet was seen today for headache.    Diagnoses and all orders for this visit:    Orbital contusion, right, subsequent encounter    Intractable acute post-traumatic headache          Restrictions: Regular Duty, Discharged from Occupational Health  Follow-up if symptoms worsen or fail to improve.

## 2018-10-09 NOTE — LETTER
[facility]  [facility]  [place] [address]  Phone: [phone]  Fax: [phone]    Pt Name: Lizet Palencia  Injury Date: 09/24/2018   Employee ID:  Date of Treatment: 10/09/2018   Company: [employer]      Appointment Time: 02:15 PM Arrived: 1410 PM   Provider: Michael Alfonso NP Time Out:1520 PM     Office Treatment:   EXAM  DISCHARGED TO FULL DUTY      1. Orbital contusion, right, subsequent encounter    2. Intractable acute post-traumatic headache               Restrictions: Regular Duty, Discharged from Occupational Health     Return Appointment: NONE

## 2018-10-09 NOTE — PATIENT INSTRUCTIONS
Periorbital Contusion (Black Eye) (Child)  A contusion is a bruise. A bruise around the eye is called a periorbital contusion. This is also known as a black eye. A black eye is often caused by a blow to the eye area. It is an injury to the skin around the eye, not to the eyeball itself.  Symptoms of a black eye include bruising, swelling, and pain. Your childs eyelid may not open easily because of swelling.  Cool compresses or cold packs help reduce swelling. Bruising may take a while to heal. In some cases, the cause of the black eye can injure the eye, too. If the eye has also been injured, your child may need to wear an eye shield for a week or more.  Home care  The healthcare provider may prescribe medicines for pain and inflammation. Follow all instructions for giving these to your child.  General care  · Apply a cold pack wrapped in a thin, dry cloth to the injury. Do this for up to 15 minutes every hour while your child is awake. This is to help relieve swelling. Continue for 1 to 2 days or as instructed.  · Babies ages 9 to 11 months: As often as possible, hold your child with his or her head higher than the heart for the first day. This is to help ease swelling.  · Children 12 months and up: Have your child rest with his or her head and shoulders raised on pillows for the first day or so. This is to help ease swelling.  · Dont let your child rub the injured eye.  · Have your child rest or play quietly for a day or two. Make sure your child doesnt play roughly. Dont let your child play sports or run during this time.  · Follow the instructions from your healthcare provider on how to use an eye shield.  Follow-up care  Follow up with your childs healthcare provider, or as advised.  Special note to parents  Healthcare providers are trained to see injuries such as this in young children as a sign of possible abuse. You may be asked questions about how your child was injured. Healthcare providers are  required by law to ask you these questions. This is done to protect your child.  When to seek medical advice  Call your child's healthcare provider right away if any of these occur:  · Vision problems, such as blurred vision  · Bruising that spreads  · Swelling or pain that doesnt get better  · Nausea or vomiting  Call 911  Call emergency services if any of these occur:  · Trouble breathing  · Confusion  · Extreme drowsiness or trouble awakening  · Fainting or loss of consciousness  · Rapid heart rate  · Seizure  · Stiff neck  Date Last Reviewed: 6/15/2015  © 4357-9427 [employer]. [address] [place] [address]. All rights reserved. This information is not intended as a substitute for professional medical care. Always follow your healthcare professional's instructions.

## 2018-10-29 ENCOUNTER — OFFICE VISIT (OUTPATIENT)
Dept: URGENT CARE | Facility: CLINIC | Age: 48
End: 2018-10-29
Payer: OTHER GOVERNMENT

## 2018-10-29 VITALS
HEIGHT: 67 IN | OXYGEN SATURATION: 100 % | TEMPERATURE: 98 F | RESPIRATION RATE: 18 BRPM | DIASTOLIC BLOOD PRESSURE: 87 MMHG | BODY MASS INDEX: 37.67 KG/M2 | SYSTOLIC BLOOD PRESSURE: 168 MMHG | WEIGHT: 240 LBS | HEART RATE: 66 BPM

## 2018-10-29 DIAGNOSIS — R22.0 LIP SWELLING: ICD-10-CM

## 2018-10-29 DIAGNOSIS — B00.1 COLD SORE: Primary | ICD-10-CM

## 2018-10-29 PROCEDURE — 99214 OFFICE O/P EST MOD 30 MIN: CPT | Mod: S$GLB,,, | Performed by: NURSE PRACTITIONER

## 2018-10-29 RX ORDER — VALACYCLOVIR HYDROCHLORIDE 1 G/1
2000 TABLET, FILM COATED ORAL 2 TIMES DAILY
Qty: 4 TABLET | Refills: 0 | Status: SHIPPED | OUTPATIENT
Start: 2018-10-29 | End: 2019-10-16

## 2018-10-29 NOTE — LETTER
October 29, 2018      [facility] [address] [address]  Phone: [phone]  Fax: [phone]       Patient: Lizet Palencia   YOB: 1970  Date of Visit: 10/29/2018    To Whom It May Concern:    Rohan Palencia  was at [facility] on 10/29/2018. She may return to work on 10/31/18  with no restrictions. If you have any questions or concerns, or if I can be of further assistance, please do not hesitate to contact me.    Sincerely,    Marta Og NP

## 2018-10-29 NOTE — PROGRESS NOTES
"Subjective:       Patient ID: Lizet Palencia is a 48 y.o. female.    Vitals:  height is 5' 7" (1.702 m) and weight is 108.9 kg (240 lb). Her temperature is 98.4 °F (36.9 °C). Her blood pressure is 168/87 (abnormal) and her pulse is 66. Her respiration is 18 and oxygen saturation is 100%.     Chief Complaint: Oral Swelling    Pt c/o lower lip swelling     Patient reports that today she felt s/s tingling and burning sensation to her lower lip and then throughout the day her lower lip has been swelling and is painful. + blister formation to her lower lip in clinic today.   No rashes. No SOB, stridor, wheezing, drooling, muffled voice, difficulty speaking, difficulty swallowing, tongue swelling, or airway swelling.   Of note she is on Losartan. Has been for 3 years.   No known hx of HSV.       Allergic Reaction   This is a new problem. The current episode started today. The problem is unchanged. The problem is moderate. It is unknown what she was exposed to. The time of exposure is unknown. The exposure occurred at work. Pertinent negatives include no abdominal pain, diarrhea, rash or vomiting. Past treatments include nothing. The treatment provided no relief. Swelling is present on the lips.     Review of Systems   Constitution: Negative for chills and fever.   HENT: Negative for sore throat.    Eyes: Negative for blurred vision.   Skin: Negative for rash.   Musculoskeletal: Negative for back pain and joint pain.   Gastrointestinal: Negative for abdominal pain, diarrhea, nausea and vomiting.   Neurological: Negative for headaches.   Psychiatric/Behavioral: The patient is not nervous/anxious.        Objective:      Physical Exam   Constitutional: She is oriented to person, place, and time. She appears well-developed and well-nourished.  Non-toxic appearance. She does not have a sickly appearance. She does not appear ill. No distress.   NAD  rr even and unlabored.    HENT:   Head: Normocephalic and atraumatic. Head " is without abrasion, without contusion and without laceration.   Right Ear: External ear normal.   Left Ear: External ear normal.   Nose: Nose normal.   Mouth/Throat: Uvula is midline, oropharynx is clear and moist and mucous membranes are normal. Oral lesions present. No trismus in the jaw. No uvula swelling. No oropharyngeal exudate, posterior oropharyngeal edema or tonsillar abscesses.       No stridor  No wheezing  No tongue swelling or airway swelling  No drooling.   Voice is not muffled.    Eyes: Conjunctivae, EOM and lids are normal. Pupils are equal, round, and reactive to light.   Neck: Trachea normal, full passive range of motion without pain and phonation normal. Neck supple.   Cardiovascular: Normal rate, regular rhythm and normal heart sounds.   Pulmonary/Chest: Effort normal and breath sounds normal. No accessory muscle usage or stridor. No tachypnea. No respiratory distress. She has no decreased breath sounds. She has no wheezes. She has no rhonchi. She has no rales.   Musculoskeletal: Normal range of motion.   Neurological: She is alert and oriented to person, place, and time.   Skin: Skin is warm, dry and intact. Capillary refill takes less than 2 seconds. No abrasion, no bruising, no burn, no ecchymosis, no laceration, no lesion and no rash noted. She is not diaphoretic. No erythema.   No rashes or hives   Psychiatric: She has a normal mood and affect. Her speech is normal and behavior is normal. Judgment and thought content normal. Cognition and memory are normal.   Nursing note and vitals reviewed.                  Assessment:       1. Cold sore    2. Lip swelling        Plan:         Cold sore  -     valACYclovir (VALTREX) 1000 MG tablet; Take 2 tablets (2,000 mg total) by mouth 2 (two) times daily. for 1 day  Dispense: 4 tablet; Refill: 0    Lip swelling  -     valACYclovir (VALTREX) 1000 MG tablet; Take 2 tablets (2,000 mg total) by mouth 2 (two) times daily. for 1 day  Dispense: 4 tablet;  Refill: 0       I discussed at length with her the warning s.s for angioedema as she is on Losartan. 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 Will treat with antivirals. Discussed follow up if s/s persist. ER precautions reviewed for is lip swelling progresses or worsen.   ER precautions reviewed and provided patient with a handout on angioedema.     Patient Instructions   It appears today that the lip lesion is possible cold sore (herpes type 1 virus) as it started a tingling/burning sensation and then progressed to a blister formation and is painful. We will treat with antivirals.   If s/s do not improve or if they acutely worsen- please go to ER as it could be a side effect to your blood pressure medication- Losartan as we discussed. See angioedema handout on what to look for.     Please return here or go to the Emergency Department for any concerns or worsening of condition.  If you were prescribed antibiotics, please take them to completion.  If you were prescribed a narcotic medication, do not drive or operate heavy equipment or machinery while taking these medications.  Please follow up with your primary care doctor or specialist as needed.    If you  smoke, please stop smoking.            The Herpes Virus  Herpes is a virus that can cause sores on the skin. There are 2 types of the virus. Depending on how you come in contact with the virus, either type can cause outbreaks near the mouth or on the sex organs.  Understanding the herpes virus  Herpes reproduces only when it is inside the body. It does so by tricking a healthy cell into producing copies of the herpes virus. Each copy can infect nearby cells. But, before too long, the bodys defenses rally to stop the attack. The immune system forces the virus to retreat. Even then, the virus stays inside the body but does not cause disease. For some people, an acute outbreak never happens again. For others, outbreaks are more likely to occur  "due to menstruation, illness, poor diet, fatigue, exposure to cold or strong sunlight, or stress.    How the herpes virus attacks  1. The herpes virus enters the body through a small break in the skin. The virus can also enter by direct contact with mucous membranes, such as those of the lips, vagina, or anus.  2. Inside the body, the herpes virus binds to a special site on a skin cell. Then part of the virus moves into the cell.  3. Inside the skin cell, the virus releases a set of instructions. These commands cause the cell to begin making copies of the herpes virus.  4. Herpes blisters appear on the skin. Herpes blisters may also appear on mucous membranes lining the mouth, vagina, or anus.  Date Last Reviewed: 1/1/2017  © 5163-5459 [employer]. [address] [place], [place] [id number]. All rights reserved. This information is not intended as a substitute for professional medical care. Always follow your healthcare professional's instructions.        Cold Sore (Child)  A cold sore (also called fever blister) is a common viral infection around the lips. It is caused by the herpes simplex virus. It spreads easily from person to person. People are often first exposed to the virus in childhood. Not everyone who has the virus will develop a cold sore, however.  A cold sore starts as one or more painful blisters on the lip or inside the mouth. The blisters break open and crust. They usually go away within 1 week. When your child has his or her first cold sore, he or she may also have a fever and mouth and throat pain. After the cold sore goes away, it can come back on the same spot. This is because the virus stays in the body. After the first "outbreak," though, other symptoms such as fever are usually mild or don't come back.  The frequency of cold sores varies with each child. Some will never have another one. Others will have several per year. Some things that can trigger a cold sore to come back " include:  · Emotional stress  · Another illness (cold, flu, or fever)  · Heavy sun exposure  · Overexertion and fatigue  · Menstruation  Cold sores can be spread to other people. A child can start spreading the virus from the cold sore a few days before the sore appears. The sore remains contagious until it has gone.  Home care  · If your child has been prescribed medicines, give these as the healthcare provider directs. Ask your child's healthcare provider before giving your child any over-the-counter medicines.  · Canadian petroleum jelly to a sore may help ease pain. Ask your child's healthcare provider before using any other creams or ointments.   · For severe pain, wrap an ice cub in a cloth and have your child apply it to the sore for a few minutes at a time. Older children may rinse the mouth with a glass of warm water mixed with a teaspoon of baking soda to relieve pain.  · Avoid giving your child acidic foods (citrus fruits and tomatoes).  · Teach your child not to touch the cold sore. It is important that the child does not touch the sore then touch his or her eyes. The virus can spread to the eyes.  · When your child has a cold sore, have your child:  ¨ Wash his or her hands often.  ¨ Avoid kissing others.  ¨ Not share utensils, towels, or toothbrushes.  · Clean your child's toys with a disinfectant.  · Have your child wear a hat and use sunblock on his or her lips before going out in the sun.  · Children with open draining lip sores should stay out of school or  until the sore forms a scab.  Follow-up care  Follow up with the child's healthcare provider as advised by our staff.  When to seek medical advice  Call the child's healthcare provider for any of the following:  · Eye pain, redness, or drainage from the eye  · Inability to eat or drink due to pain  Date Last Reviewed: 9/25/2015  © 0430-5764 [employer]. [address] [place] [address], [place] [address]. All rights reserved. This  information is not intended as a substitute for professional medical care. Always follow your healthcare professional's instructions.        Angioedema- from blood pressure medication- losartan.   Angioedema (MI-[id number]-ex-eh-JOHNNIE-muh) is a sudden appearance of swollen patches (edema) on the skin or mucous membranes. It most often involves the face, lips, mouth, tongue, back of throat, or vocal cords. It may also occur in other places, such as the arms or legs. A rash may also appear during the first 4 days of this illness.  There are different types of angioedema. Your symptoms will depend on what type of angioedema you have. Swelling and redness may be the main symptoms. Like allergic reactions, angioedema may include:  · Rash, hives, redness, welts, blisters  · Itching, burning, stinging, pain  · Dry, flaky, cracking, or scaly skin  · Swelling of the face, lips, tongue, or other parts of the body  More severe symptoms may include:  · Trouble swallowing, or feeling like your throat is closing  · Trouble breathing or wheezing  · Hoarse voice or trouble speaking  · Nausea, vomiting, diarrhea, or stomach cramps  · Feeling faint or lightheaded, rapid heart rate, or low blood pressure  Angioedema can be triggered by exposure to certain substances. Medical conditions involving the immune systems and certain infections may cause it. In rare cases, angioedema can be hereditary. Sometimes the cause may be very clear. However, it is often hard to find a cause. The most common causes include:  · Foods, such as shrimp, shellfish, peanuts, milk products, gluten, and eggs; also colorings, flavorings, and additives  · Insect bites or stings, from bees, mosquitos, fleas, or ticks  · Medicines, such as ACE inhibitors, penicillin, sulfa drugs, amoxicillin, aspirin, and ibuprofen  · Latex, which may be in gloves, clothes, toys, balloons, and some kinds of tape. People who are allergic to latex may have problems with foods such as  bananas, avocados, kiwi, papaya, or chestnuts.  · Stress  · Heat, cold, or sunlight  The most common cause of angioedema is a reaction to a class of medicines called ACE inhibitors. These are used to treat high blood pressure. ACE inhibitors include captopril, enalapril, and lisinopril. Angiodema can happen even after you have been taking the medicine for some time. Tell your doctor if you have angioedema symptoms and are taking any of these medicines. Angioedema may recur. It is important to watch for the earliest signs of this condition (see the list below). Contact your healthcare provider right away if swelling involves the face, mouth, or throat.  Home care  Rest quietly today. Avoid vigorous physical activity.  Medicines: The healthcare provider may prescribe medicines for itching, swelling, or pain. Follow the healthcare providers instructions when taking these medicines.  · Oral diphenhydramine is an antihistamine available without a prescription. Unless a prescription antihistamine was given, diphenhydramine may be used to reduce widespread itching. It may make you sleepy, so be careful using it when going to school, working, or driving. (Note: Do not use diphenhydramine if you have glaucoma or if you are a man who has trouble urinating due to an enlarged prostate.) Loratadine is an antihistamine that may cause less drowsiness.  · Do not use diphenhydramine cream on your skin. Some people can have an allergic reaction to this.  · Calamine lotion or oatmeal baths sometimes help with itching.  · You may use acetaminophen or ibuprofen for pain, unless another pain medicine was prescribed.  · If you were told that your angioedema was caused by a medicine you are taking, you must stop taking it. Ask your healthcare provider for a different one. In the future, advise medical staff that you are allergic to this medicine.  · If medicine was prescribed, such as steroids or antihistamines, be sure you understand  what the medicine is and how to take it.   General care  · Make sure you do not scratch areas of the body that had a reaction. This will help prevent infection.   · Stay away from air pollution, tobacco, and wood smoke. Also stay away from cold temperatures. These things can make allergy symptoms worse.  · Try to find out what cause your reaction. Make sure to remove the allergen. Future reactions may be worse.   · If you have a serious allergy, wear a medical alert bracelet that notes this allergy.  · If the healthcare provider prescribed an epinephrine auto injector kit, keep it with you at all times.   · Tell all care providers about your allergy. Ask them h ow to use any prescribed medicines.  · Keep a record of allergies and symptoms, and when they occurred. This will help your provider treat you over time.   Follow-up care  Follow up with your healthcare provider, or as advised. You may need to see an allergist. An allergist can help find the cause of an allergic reaction and give recommendations on how to prevent future reactions.  Call 911  Contact emergency services right away if any of these occur:  · Trouble breathing or swallowing, or wheezing  · Hoarse voice or trouble speaking, or drooling  · Chest pain or tightness  · Confusion, lightheadedness, or dizziness  · Extreme drowsiness or trouble awakening  · Fainting or loss of consciousness  · Rapid heart rate  · Vomiting blood, or large amounts of blood in stool  · Seizure  · Nausea, vomiting, diarrhea, abdominal pain, or stomach cramps  When to seek medical attention  Call your healthcare provider right away if any of the following occur:  · Symptoms don't go away  · Symptoms come back  · Symptoms get worse or new symptoms develop  · Hives feel uncomfortable  · Fever of 100.4°F (38°C), or as directed by your healthcare professional  Date Last Reviewed: 5/1/2017  © 4640-8623 [employer] [address] [place] [address], [place] [address]. All rights  reserved. This information is not intended as a substitute for professional medical care. Always follow your healthcare professional's instructions.

## 2018-10-29 NOTE — PATIENT INSTRUCTIONS
It appears today that the lip lesion is possible cold sore (herpes type 1 virus) as it started a tingling/burning sensation and then progressed to a blister formation and is painful. We will treat with antivirals.   If s/s do not improve or if they acutely worsen- please go to ER as it could be a side effect to your blood pressure medication- Losartan as we discussed. See angioedema handout on what to look for.     Please return here or go to the Emergency Department for any concerns or worsening of condition.  If you were prescribed antibiotics, please take them to completion.  If you were prescribed a narcotic medication, do not drive or operate heavy equipment or machinery while taking these medications.  Please follow up with your primary care doctor or specialist as needed.    If you  smoke, please stop smoking.            The Herpes Virus  Herpes is a virus that can cause sores on the skin. There are 2 types of the virus. Depending on how you come in contact with the virus, either type can cause outbreaks near the mouth or on the sex organs.  Understanding the herpes virus  Herpes reproduces only when it is inside the body. It does so by tricking a healthy cell into producing copies of the herpes virus. Each copy can infect nearby cells. But, before too long, the bodys defenses rally to stop the attack. The immune system forces the virus to retreat. Even then, the virus stays inside the body but does not cause disease. For some people, an acute outbreak never happens again. For others, outbreaks are more likely to occur due to menstruation, illness, poor diet, fatigue, exposure to cold or strong sunlight, or stress.    How the herpes virus attacks  1. The herpes virus enters the body through a small break in the skin. The virus can also enter by direct contact with mucous membranes, such as those of the lips, vagina, or anus.  2. Inside the body, the herpes virus binds to a special site on a skin cell. Then  "part of the virus moves into the cell.  3. Inside the skin cell, the virus releases a set of instructions. These commands cause the cell to begin making copies of the herpes virus.  4. Herpes blisters appear on the skin. Herpes blisters may also appear on mucous membranes lining the mouth, vagina, or anus.  Date Last Reviewed: 1/1/2017  © 2279-4597 [employer]. [address] [place] [address], [place] [address]. All rights reserved. This information is not intended as a substitute for professional medical care. Always follow your healthcare professional's instructions.        Cold Sore (Child)  A cold sore (also called fever blister) is a common viral infection around the lips. It is caused by the herpes simplex virus. It spreads easily from person to person. People are often first exposed to the virus in childhood. Not everyone who has the virus will develop a cold sore, however.  A cold sore starts as one or more painful blisters on the lip or inside the mouth. The blisters break open and crust. They usually go away within 1 week. When your child has his or her first cold sore, he or she may also have a fever and mouth and throat pain. After the cold sore goes away, it can come back on the same spot. This is because the virus stays in the body. After the first "outbreak," though, other symptoms such as fever are usually mild or don't come back.  The frequency of cold sores varies with each child. Some will never have another one. Others will have several per year. Some things that can trigger a cold sore to come back include:  · Emotional stress  · Another illness (cold, flu, or fever)  · Heavy sun exposure  · Overexertion and fatigue  · Menstruation  Cold sores can be spread to other people. A child can start spreading the virus from the cold sore a few days before the sore appears. The sore remains contagious until it has gone.  Home care  · If your child has been prescribed medicines, give these as the " healthcare provider directs. Ask your child's healthcare provider before giving your child any over-the-counter medicines.  · Maskell petroleum jelly to a sore may help ease pain. Ask your child's healthcare provider before using any other creams or ointments.   · For severe pain, wrap an ice cub in a cloth and have your child apply it to the sore for a few minutes at a time. Older children may rinse the mouth with a glass of warm water mixed with a teaspoon of baking soda to relieve pain.  · Avoid giving your child acidic foods (citrus fruits and tomatoes).  · Teach your child not to touch the cold sore. It is important that the child does not touch the sore then touch his or her eyes. The virus can spread to the eyes.  · When your child has a cold sore, have your child:  ¨ Wash his or her hands often.  ¨ Avoid kissing others.  ¨ Not share utensils, towels, or toothbrushes.  · Clean your child's toys with a disinfectant.  · Have your child wear a hat and use sunblock on his or her lips before going out in the sun.  · Children with open draining lip sores should stay out of school or  until the sore forms a scab.  Follow-up care  Follow up with the child's healthcare provider as advised by our staff.  When to seek medical advice  Call the child's healthcare provider for any of the following:  · Eye pain, redness, or drainage from the eye  · Inability to eat or drink due to pain  Date Last Reviewed: 9/25/2015 © 2000-2017 [employer]. [address] [place] [address]. All rights reserved. This information is not intended as a substitute for professional medical care. Always follow your healthcare professional's instructions.        Angioedema- from blood pressure medication- losartan.   Angioedema (LT-nxh-jc-eh-JOHNNIE-muh) is a sudden appearance of swollen patches (edema) on the skin or mucous membranes. It most often involves the face, lips, mouth, tongue, back of throat, or vocal cords.  It may also occur in other places, such as the arms or legs. A rash may also appear during the first 4 days of this illness.  There are different types of angioedema. Your symptoms will depend on what type of angioedema you have. Swelling and redness may be the main symptoms. Like allergic reactions, angioedema may include:  · Rash, hives, redness, welts, blisters  · Itching, burning, stinging, pain  · Dry, flaky, cracking, or scaly skin  · Swelling of the face, lips, tongue, or other parts of the body  More severe symptoms may include:  · Trouble swallowing, or feeling like your throat is closing  · Trouble breathing or wheezing  · Hoarse voice or trouble speaking  · Nausea, vomiting, diarrhea, or stomach cramps  · Feeling faint or lightheaded, rapid heart rate, or low blood pressure  Angioedema can be triggered by exposure to certain substances. Medical conditions involving the immune systems and certain infections may cause it. In rare cases, angioedema can be hereditary. Sometimes the cause may be very clear. However, it is often hard to find a cause. The most common causes include:  · Foods, such as shrimp, shellfish, peanuts, milk products, gluten, and eggs; also colorings, flavorings, and additives  · Insect bites or stings, from bees, mosquitos, fleas, or ticks  · Medicines, such as ACE inhibitors, penicillin, sulfa drugs, amoxicillin, aspirin, and ibuprofen  · Latex, which may be in gloves, clothes, toys, balloons, and some kinds of tape. People who are allergic to latex may have problems with foods such as bananas, avocados, kiwi, papaya, or chestnuts.  · Stress  · Heat, cold, or sunlight  The most common cause of angioedema is a reaction to a class of medicines called ACE inhibitors. These are used to treat high blood pressure. ACE inhibitors include captopril, enalapril, and lisinopril. Angiodema can happen even after you have been taking the medicine for some time. Tell your doctor if you have  angioedema symptoms and are taking any of these medicines. Angioedema may recur. It is important to watch for the earliest signs of this condition (see the list below). Contact your healthcare provider right away if swelling involves the face, mouth, or throat.  Home care  Rest quietly today. Avoid vigorous physical activity.  Medicines: The healthcare provider may prescribe medicines for itching, swelling, or pain. Follow the healthcare providers instructions when taking these medicines.  · Oral diphenhydramine is an antihistamine available without a prescription. Unless a prescription antihistamine was given, diphenhydramine may be used to reduce widespread itching. It may make you sleepy, so be careful using it when going to school, working, or driving. (Note: Do not use diphenhydramine if you have glaucoma or if you are a man who has trouble urinating due to an enlarged prostate.) Loratadine is an antihistamine that may cause less drowsiness.  · Do not use diphenhydramine cream on your skin. Some people can have an allergic reaction to this.  · Calamine lotion or oatmeal baths sometimes help with itching.  · You may use acetaminophen or ibuprofen for pain, unless another pain medicine was prescribed.  · If you were told that your angioedema was caused by a medicine you are taking, you must stop taking it. Ask your healthcare provider for a different one. In the future, advise medical staff that you are allergic to this medicine.  · If medicine was prescribed, such as steroids or antihistamines, be sure you understand what the medicine is and how to take it.   General care  · Make sure you do not scratch areas of the body that had a reaction. This will help prevent infection.   · Stay away from air pollution, tobacco, and wood smoke. Also stay away from cold temperatures. These things can make allergy symptoms worse.  · Try to find out what cause your reaction. Make sure to remove the allergen. Future reactions  may be worse.   · If you have a serious allergy, wear a medical alert bracelet that notes this allergy.  · If the healthcare provider prescribed an epinephrine auto injector kit, keep it with you at all times.   · Tell all care providers about your allergy. Ask them h ow to use any prescribed medicines.  · Keep a record of allergies and symptoms, and when they occurred. This will help your provider treat you over time.   Follow-up care  Follow up with your healthcare provider, or as advised. You may need to see an allergist. An allergist can help find the cause of an allergic reaction and give recommendations on how to prevent future reactions.  Call 911  Contact emergency services right away if any of these occur:  · Trouble breathing or swallowing, or wheezing  · Hoarse voice or trouble speaking, or drooling  · Chest pain or tightness  · Confusion, lightheadedness, or dizziness  · Extreme drowsiness or trouble awakening  · Fainting or loss of consciousness  · Rapid heart rate  · Vomiting blood, or large amounts of blood in stool  · Seizure  · Nausea, vomiting, diarrhea, abdominal pain, or stomach cramps  When to seek medical attention  Call your healthcare provider right away if any of the following occur:  · Symptoms don't go away  · Symptoms come back  · Symptoms get worse or new symptoms develop  · Hives feel uncomfortable  · Fever of 100.4°F (38°C), or as directed by your healthcare professional  Date Last Reviewed: 5/1/2017  © 6645-3665 [employer]. [address] [place] [address], [place] [address]. All rights reserved. This information is not intended as a substitute for professional medical care. Always follow your healthcare professional's instructions.

## 2018-12-05 ENCOUNTER — OFFICE VISIT (OUTPATIENT)
Dept: CARDIOLOGY | Facility: CLINIC | Age: 48
End: 2018-12-05
Payer: OTHER GOVERNMENT

## 2018-12-05 VITALS
HEART RATE: 73 BPM | OXYGEN SATURATION: 98 % | SYSTOLIC BLOOD PRESSURE: 127 MMHG | DIASTOLIC BLOOD PRESSURE: 85 MMHG | BODY MASS INDEX: 37.98 KG/M2 | HEIGHT: 67 IN | WEIGHT: 242 LBS

## 2018-12-05 DIAGNOSIS — E66.9 OBESITY (BMI 30-39.9): ICD-10-CM

## 2018-12-05 DIAGNOSIS — R73.03 PREDIABETES: ICD-10-CM

## 2018-12-05 DIAGNOSIS — I25.10 CORONARY ARTERY DISEASE INVOLVING NATIVE CORONARY ARTERY OF NATIVE HEART WITHOUT ANGINA PECTORIS: Primary | ICD-10-CM

## 2018-12-05 DIAGNOSIS — Z95.5 STATUS POST CORONARY ARTERY STENT PLACEMENT: ICD-10-CM

## 2018-12-05 DIAGNOSIS — I25.2 HISTORY OF NON-ST ELEVATION MYOCARDIAL INFARCTION (NSTEMI): ICD-10-CM

## 2018-12-05 PROCEDURE — 99213 OFFICE O/P EST LOW 20 MIN: CPT | Mod: PBBFAC,PO | Performed by: INTERNAL MEDICINE

## 2018-12-05 PROCEDURE — 99999 PR PBB SHADOW E&M-EST. PATIENT-LVL III: CPT | Mod: PBBFAC,,, | Performed by: INTERNAL MEDICINE

## 2018-12-05 PROCEDURE — 99214 OFFICE O/P EST MOD 30 MIN: CPT | Mod: S$PBB,,, | Performed by: INTERNAL MEDICINE

## 2018-12-05 NOTE — PROGRESS NOTES
Subjective:   Patient ID:  Lizet Palencia is a 48 y.o. female who presents for follow-up of No chief complaint on file.      Problem List Items Addressed This Visit        Cardiac/Vascular    Status post coronary artery stent placement    History of non-ST elevation myocardial infarction (NSTEMI)    Coronary artery disease involving native coronary artery of native heart without angina pectoris - Primary       Endocrine    Obesity (BMI 30-39.9)    Prediabetes          HPI: Patient with PMh of CAD s/p stent. She is doing well since previous visit. No chest pain or dyspnea. BP is controlled. She is compliant with medications. She is active doing [employer] boxing, girls  and and sometime walk on treadmill at the gym. No difficulties being active. She have however braden 11 lbs since previous visit.     She has history of NSTEMI in 2015 with LCx  ANGIOGRAPHIC RESULTS:    DIAGNOSTIC:       Patient has a right dominant coronary artery.        - Left Main Coronary Artery:             The LM is normal. There is BERTHA 3 flow.       - Left Anterior Descending Artery:             The LAD is normal. There is BERTHA 3 flow.       - Left Circumflex Artery:             The mid LCX has a 95% stenosis. There is BERTHA 2 flow. The remaining portion of the vessel has luminal irregularities.                     Lesion Details: Eccentric shape, mild proximal tortuosity, segment angulation of 45-90 degrees, irregular contour. The lesion is ulcerated with mild thrombus. 95% culprit stenosis dominant mid LCx with BERTHA 2 fow       - Right Coronary Artery:             The RCA has luminal irregularities. There is BERTHA 3 flow.       - Common Femoral Artery:             The right CFA is normal. high bifurcation    INTERVENTION:         Mid LCX:              The lesion was successfully intervened. Post-stenosis of 0% and post-BERTHA 3 flow. The vessel was accessed natively.  This was a MI culprit lesion.  The Posterior myocardial wall  was affected. The following items were used: Balloon Sprinter   Legend Rx 2.0 X 15 and Stent Resolute Rx 3.5 X 15 (ALDA).    She then had chest pain in 2016 and subsequent LHC showed no severe obstructive disease.     Review of Systems   Constitution: Negative.   HENT: Negative.    Eyes: Negative.    Cardiovascular: Negative.    Respiratory: Negative.    Endocrine: Negative.    Hematologic/Lymphatic: Negative.    Skin: Negative.    Musculoskeletal: Negative.    Gastrointestinal: Negative.    Neurological: Negative.           Medication List           Accurate as of 12/5/18  3:23 PM. If you have any questions, ask your nurse or doctor.               CONTINUE taking these medications    amLODIPine 10 MG tablet  Commonly known as:  NORVASC  TAKE 1 TABLET BY MOUTH EVERY DAY     aspirin 81 MG Chew     atorvastatin 80 MG tablet  Commonly known as:  LIPITOR  Take 1 tablet (80 mg total) by mouth once daily.     carvedilol 25 MG tablet  Commonly known as:  COREG  Take 1 tablet (25 mg total) by mouth 2 (two) times daily with meals.     cetirizine 10 MG tablet  Commonly known as:  ZYRTEC  Take 1 tablet (10 mg total) by mouth every evening.     chlorthalidone 25 MG Tab  Commonly known as:  HYGROTEN  Take 1 tablet (25 mg total) by mouth once daily.     fluticasone 50 mcg/actuation nasal spray  Commonly known as:  FLONASE  2 sprays (100 mcg total) by Each Nare route once daily.     hydrALAZINE 25 MG tablet  Commonly known as:  APRESOLINE  Take 1 tablet (25 mg total) by mouth every 12 (twelve) hours.     losartan 100 MG tablet  Commonly known as:  COZAAR  Take 1 tablet (100 mg total) by mouth once daily.     valACYclovir 1000 MG tablet  Commonly known as:  VALTREX  Take 2 tablets (2,000 mg total) by mouth 2 (two) times daily. for 1 day        STOP taking these medications    etodolac 400 MG tablet  Commonly known as:  LODINE  Stopped by:  Page Stauffer MD            Objective:   Physical Exam   Constitutional: She is oriented to  person, place, and time. She appears well-developed and well-nourished. No distress.   Examination of the digits showed no clubbing or cyanosis   HENT:   Head: Normocephalic and atraumatic.   Eyes: Conjunctivae are normal. Pupils are equal, round, and reactive to light. Right eye exhibits no discharge.   Neck: Normal range of motion. Neck supple. No JVD present. No thyromegaly present.   No carotid bruits   Cardiovascular: Normal rate, regular rhythm, S1 normal, S2 normal, normal heart sounds, intact distal pulses and normal pulses. PMI is not displaced. Exam reveals no gallop, no friction rub and no opening snap.   No murmur heard.  Pulmonary/Chest: Effort normal and breath sounds normal. No respiratory distress. She has no wheezes. She has no rales. She exhibits no tenderness.   Abdominal: Soft. Bowel sounds are normal. She exhibits no distension and no mass. There is no tenderness. There is no guarding.   No hepatosplenomegaly   Musculoskeletal: Normal range of motion. She exhibits no edema or tenderness.   Lymphadenopathy:     She has no cervical adenopathy.   Neurological: She is alert and oriented to person, place, and time.   Skin: Skin is warm. No rash noted. She is not diaphoretic. No erythema.   Psychiatric: She has a normal mood and affect.   Nursing note and vitals reviewed.      ECGs reviewed  LABS reviewed  Imaging including Echoes reviewed    Angiographic Results     Diagnostic:          Patient has a left dominant coronary artery.        The coronary vessels are all normal.        - Left Main Coronary Artery:             The LM is normal. There is BERTHA 3 flow.     - Left Anterior Descending Artery:             The LAD is normal. There is BERTHA 3 flow.     - Left Circumflex Artery:             The mid LCX is normal is patent. There is BERTHA 3 flow.     - Right Coronary Artery:             The RCA is normal. There is BERTHA 3 flow.     - Left Renal Artery:             The proximal left renal is normal.      - Right Renal Artery:             The proximal right renal is normal.     - Common Femoral Artery:             The right CFA is normal.     - Femoral Artery:             The femoral artery is normal.      Assessment:     1. Coronary artery disease involving native coronary artery of native heart without angina pectoris    2. Status post coronary artery stent placement    3. History of non-ST elevation myocardial infarction (NSTEMI)    4. Obesity (BMI 30-39.9)    5. Prediabetes        Plan:     Continue current medications.   Activity as tolerated  Weight loss vs starting zetia  F/u in 6 months. Lipid panel

## 2019-01-09 DIAGNOSIS — I10 ACCELERATED HYPERTENSION: ICD-10-CM

## 2019-01-09 DIAGNOSIS — I25.10 CORONARY ARTERY DISEASE INVOLVING NATIVE CORONARY ARTERY OF NATIVE HEART WITHOUT ANGINA PECTORIS: ICD-10-CM

## 2019-01-09 RX ORDER — POTASSIUM CHLORIDE 1500 MG/1
TABLET, EXTENDED RELEASE ORAL
Qty: 90 TABLET | Refills: 0 | Status: SHIPPED | OUTPATIENT
Start: 2019-01-09 | End: 2019-05-28 | Stop reason: SDUPTHER

## 2019-01-09 RX ORDER — CARVEDILOL 25 MG/1
TABLET ORAL
Qty: 180 TABLET | Refills: 4 | Status: SHIPPED | OUTPATIENT
Start: 2019-01-09 | End: 2020-02-24 | Stop reason: SDUPTHER

## 2019-01-14 ENCOUNTER — OFFICE VISIT (OUTPATIENT)
Dept: ORTHOPEDICS | Facility: CLINIC | Age: 49
End: 2019-01-14
Payer: OTHER GOVERNMENT

## 2019-01-14 VITALS — WEIGHT: 242 LBS | HEIGHT: 67 IN | BODY MASS INDEX: 37.98 KG/M2

## 2019-01-14 DIAGNOSIS — G56.22 CUBITAL TUNNEL SYNDROME ON LEFT: Primary | ICD-10-CM

## 2019-01-14 PROCEDURE — 99999 PR PBB SHADOW E&M-EST. PATIENT-LVL II: ICD-10-PCS | Mod: PBBFAC,,, | Performed by: ORTHOPAEDIC SURGERY

## 2019-01-14 PROCEDURE — 99203 PR OFFICE/OUTPT VISIT, NEW, LEVL III, 30-44 MIN: ICD-10-PCS | Mod: S$PBB,,, | Performed by: ORTHOPAEDIC SURGERY

## 2019-01-14 PROCEDURE — 99212 OFFICE O/P EST SF 10 MIN: CPT | Mod: PBBFAC,PN | Performed by: ORTHOPAEDIC SURGERY

## 2019-01-14 PROCEDURE — 99203 OFFICE O/P NEW LOW 30 MIN: CPT | Mod: S$PBB,,, | Performed by: ORTHOPAEDIC SURGERY

## 2019-01-14 PROCEDURE — 99999 PR PBB SHADOW E&M-EST. PATIENT-LVL II: CPT | Mod: PBBFAC,,, | Performed by: ORTHOPAEDIC SURGERY

## 2019-01-14 NOTE — LETTER
January 14, 2019        Marta Louis MD  [address]  [address]  [place] [address]             [facility]  [address]  [facility] [address]  Phone: [phone]   Patient: Lizet Palencia   MR Number: [id number]   YOB: 1970   Date of Visit: 1/14/2019       Dear Dr. Louis:    Thank you for referring Lizet Palencia to me for evaluation. Below are the relevant portions of my assessment and plan of care.            If you have questions, please do not hesitate to call me. I look forward to following Lizet along with you.    Sincerely,      Joseph Ca Jr., MD           CC  No Recipients

## 2019-01-14 NOTE — PROGRESS NOTES
INITIAL VISIT HISTORY:  A 48-year-old female presents for evaluation of numbness   and pain in the left hand.  She reports that she has had numbness in the left   small finger for about six months or so, but more recently started having pain   in the left forearm and hand, seems to be episodic, not continuous.  No history   of trauma or injury is reported.  No history of neck problems reported.  On an   unrelated matter, she had an injury to her right forearm some years ago, which   caused an ulnar nerve palsy.    She has not had a nerve test on either hand recently.    PAST MEDICAL HISTORY:  Significant for hypertension, rhinitis, hepatitis C   positive, obesity, prediabetes.    PAST SURGICAL HISTORY:  Includes , D and C, tubal ligation.    FAMILY HISTORY:  Positive for diabetes and hypertension.    SOCIAL HISTORY:  The patient does not smoke, drinks alcohol socially.    REVIEW OF SYSTEMS:  Negative fever, chills, rashes.    CURRENT MEDICATIONS:  Reviewed on chart.    ALLERGIES:  None.    PHYSICAL EXAMINATION:  GENERAL:  Well-developed, well-nourished female in no acute distress, alert and   oriented x3.  EXTREMITIES:  Examination of the upper extremities significant for the left   hand, demonstrating a slightly decreased sensation left small finger.  There is   no atrophy, no weakness left hand.  No clawing, although on the opposite right   hand she does have clawing of the ring and small finger.  The left elbow is   nontender, full range of motion of left elbow.  No instability.  Mildly positive   Tinel sign at the elbow.    IMPRESSION:  1.  Left hand numbness.  2.  Possible left cubital tunnel syndrome.    PLAN:  I have ordered a nerve conduction study of left arm to check for cubital   tunnel syndrome.  In the meantime, avoid direct pressure to the elbow.  I did   offer her the option of gabapentin at bedtime, but she deferred on this and   really does not feel like she needs anything for pain.  Follow  up after the   nerve test is complete.      CRYSTAL/TRENA  dd: 01/14/2019 16:58:23 (CST)  td: 01/15/2019 13:30:16 (CST)  Doc ID   #[id number]  Job ID #[id number]    CC:

## 2019-05-22 DIAGNOSIS — R73.03 PREDIABETES: ICD-10-CM

## 2019-05-22 DIAGNOSIS — Z79.899 MEDICATION MANAGEMENT: ICD-10-CM

## 2019-05-22 DIAGNOSIS — I25.10 CORONARY ARTERY DISEASE INVOLVING NATIVE CORONARY ARTERY OF NATIVE HEART WITHOUT ANGINA PECTORIS: ICD-10-CM

## 2019-05-22 DIAGNOSIS — Z95.5 STATUS POST CORONARY ARTERY STENT PLACEMENT: ICD-10-CM

## 2019-05-22 DIAGNOSIS — I21.4 NSTEMI (NON-ST ELEVATED MYOCARDIAL INFARCTION): ICD-10-CM

## 2019-05-22 DIAGNOSIS — E87.6 DIURETIC-INDUCED HYPOKALEMIA: Primary | ICD-10-CM

## 2019-05-22 DIAGNOSIS — T50.2X5A DIURETIC-INDUCED HYPOKALEMIA: Primary | ICD-10-CM

## 2019-05-22 DIAGNOSIS — I10 ACCELERATED HYPERTENSION: ICD-10-CM

## 2019-05-23 DIAGNOSIS — I10 ACCELERATED HYPERTENSION: ICD-10-CM

## 2019-05-23 RX ORDER — LOSARTAN POTASSIUM 100 MG/1
TABLET ORAL
Qty: 90 TABLET | Refills: 0 | Status: SHIPPED | OUTPATIENT
Start: 2019-05-23 | End: 2020-01-02

## 2019-05-23 RX ORDER — AMLODIPINE BESYLATE 10 MG/1
10 TABLET ORAL DAILY
Qty: 90 TABLET | Refills: 4 | Status: SHIPPED | OUTPATIENT
Start: 2019-05-23 | End: 2020-02-24 | Stop reason: SDUPTHER

## 2019-05-23 RX ORDER — CHLORTHALIDONE 25 MG/1
TABLET ORAL
Qty: 90 TABLET | Refills: 0 | Status: SHIPPED | OUTPATIENT
Start: 2019-05-23 | End: 2020-01-02

## 2019-05-23 RX ORDER — ATORVASTATIN CALCIUM 80 MG/1
TABLET, FILM COATED ORAL
Qty: 90 TABLET | Refills: 0 | Status: SHIPPED | OUTPATIENT
Start: 2019-05-23 | End: 2020-01-02

## 2019-05-23 NOTE — TELEPHONE ENCOUNTER
Filled 90 days of requested meds, needs annual physical with labs prior within the next 90 days. Lab orders attached, please schedule

## 2019-05-29 RX ORDER — POTASSIUM CHLORIDE 1500 MG/1
TABLET, EXTENDED RELEASE ORAL
Qty: 90 TABLET | Refills: 0 | Status: SHIPPED | OUTPATIENT
Start: 2019-05-29 | End: 2020-01-02

## 2019-09-13 ENCOUNTER — OFFICE VISIT (OUTPATIENT)
Dept: URGENT CARE | Facility: CLINIC | Age: 49
End: 2019-09-13
Payer: OTHER GOVERNMENT

## 2019-09-13 VITALS
OXYGEN SATURATION: 96 % | TEMPERATURE: 98 F | SYSTOLIC BLOOD PRESSURE: 152 MMHG | DIASTOLIC BLOOD PRESSURE: 96 MMHG | BODY MASS INDEX: 37.98 KG/M2 | RESPIRATION RATE: 20 BRPM | WEIGHT: 242 LBS | HEIGHT: 67 IN | HEART RATE: 63 BPM

## 2019-09-13 DIAGNOSIS — J30.2 SEASONAL ALLERGIC RHINITIS, UNSPECIFIED TRIGGER: Primary | ICD-10-CM

## 2019-09-13 DIAGNOSIS — R09.82 POST-NASAL DRIP: ICD-10-CM

## 2019-09-13 PROCEDURE — 96372 THER/PROPH/DIAG INJ SC/IM: CPT | Mod: S$GLB,,, | Performed by: NURSE PRACTITIONER

## 2019-09-13 PROCEDURE — 99213 OFFICE O/P EST LOW 20 MIN: CPT | Mod: 25,S$GLB,, | Performed by: NURSE PRACTITIONER

## 2019-09-13 PROCEDURE — 99213 PR OFFICE/OUTPT VISIT, EST, LEVL III, 20-29 MIN: ICD-10-PCS | Mod: 25,S$GLB,, | Performed by: NURSE PRACTITIONER

## 2019-09-13 PROCEDURE — 96372 PR INJECTION,THERAP/PROPH/DIAG2ST, IM OR SUBCUT: ICD-10-PCS | Mod: S$GLB,,, | Performed by: NURSE PRACTITIONER

## 2019-09-13 RX ORDER — BETAMETHASONE SODIUM PHOSPHATE AND BETAMETHASONE ACETATE 3; 3 MG/ML; MG/ML
6 INJECTION, SUSPENSION INTRA-ARTICULAR; INTRALESIONAL; INTRAMUSCULAR; SOFT TISSUE
Status: COMPLETED | OUTPATIENT
Start: 2019-09-13 | End: 2019-09-13

## 2019-09-13 RX ADMIN — BETAMETHASONE SODIUM PHOSPHATE AND BETAMETHASONE ACETATE 6 MG: 3; 3 INJECTION, SUSPENSION INTRA-ARTICULAR; INTRALESIONAL; INTRAMUSCULAR; SOFT TISSUE at 02:09

## 2019-09-13 NOTE — LETTER
September 13, 2019      [facility] [address] [address]  Phone: [phone]  Fax: [phone]       Patient: Lizet Palencia   YOB: 1970  Date of Visit: 09/13/2019    To Whom It May Concern:    Rohan Palencia  was at [facility] on 09/13/2019. She may return to work/school on 9/14/19 with no restrictions. If you have any questions or concerns, or if I can be of further assistance, please do not hesitate to contact me.    Sincerely,    Frances Singh MA

## 2019-09-13 NOTE — PATIENT INSTRUCTIONS
Use an antihistamine such as Claritin, Zyrtec or Allegra to dry you out.     Use pseudoephedrine (behind the counter) to decongest. Pseudoephedrine  30 mg up to 240 mg /day. It can raise your blood pressure and give you palpitations.    Use mucinex (guaifenisin) to break up mucous up to 2400mg/day to loosen any mucous. The mucinex DM pill has a cough suppressant that can be used at night to stop the tickle at the back of your throat.    Use Nasal Saline to mechanically move any post nasal drip from your eustachian tube or from the back of your throat.    Use Afrin in each nare for no longer than 3 days, as it is addictive. It can also dry out your mucous membranes and cause elevated blood pressure.    Use Flonase 1-2 sprays/nostril per day. It is a local acting steroid nasal spray, if you develop a bloody nose, stop using the medication immediately.    Use warm salt water gargles to ease your throat pain. Warm salt water gargles as needed for sore throat-  1/2 tsp salt to 1 cup warm water, gargle as desired.    Sometimes Nyquil at night is beneficial to help you get some rest, however it is sedating and it does have an antihistamine, and tylenol.  Nasal Allergies: Related Problems  Allergies can cause nasal passages to swell. This narrows the air passages. Allergies also cause increased mucus production in the nose. These changes result in nasal allergy symptoms. Common symptoms include itching, sneezing, stuffy nose, and runny nose. Nasal allergies can also cause problems in other parts of the respiratory system. Some of the more common problems are discussed below. If you think you have any of these problems, talk to your healthcare provider about treatment choices.    Sinus infections  Fluid may be trapped in the sinuses. Bacteria may grow in trapped fluid. This causes sinus infection (sinusitis).  Conjunctivitis  Allergens irritate your eyes, including the lining of the conjunctiva. This causes eyes to become  red, itchy, puffy, and watery.  Ear problems  The eustachian tube connects the middle ear to nasal passages.  Allergies can block this tube, and make the ears feel plugged. Fluid may also build up, leading to an ear infection (otitis media).  Nasal polyps  Allergies cause nasal passages to swell. Constant swelling can lead to formation of a sac called a polyp. Polyps can grow large enough to block nasal passages.  Asthma  Asthma is inflammation and swelling of the air passages in the lungs. The symptoms are wheezing, shortness of breath, coughing, and chest tightness. Allergies, including nasal allergies, are common in people with asthma.  Date Last Reviewed: 9/1/2016  © 4192-0027 [employer]. [address] [place] [address], [place] [address]. All rights reserved. This information is not intended as a substitute for professional medical care. Always follow your healthcare professional's instructions.

## 2019-09-13 NOTE — PROGRESS NOTES
"Subjective:       Patient ID: Lizet Palencia is a 49 y.o. female.    Vitals:  height is 5' 7" (1.702 m) and weight is 109.8 kg (242 lb). Her temperature is 98 °F (36.7 °C). Her blood pressure is 152/96 (abnormal) and her pulse is 63. Her respiration is 20 and oxygen saturation is 96%.     Chief Complaint: Sinus Problem    Pt has been having a sinus headache for a week and for the past three days she has been having a sinus drip. She has taken xyzal that hasnt helped.She denies sob, cp. Denies fever, sore throat. BS    Sinus Problem   This is a new problem. The current episode started in the past 7 days. The problem has been gradually worsening since onset. There has been no fever. Her pain is at a severity of 0/10. She is experiencing no pain. Associated symptoms include congestion, headaches, sinus pressure and a sore throat. Pertinent negatives include no chills, coughing or shortness of breath. Past treatments include oral decongestants. The treatment provided no relief.       Constitution: Negative. Negative for chills, fatigue and fever.   HENT: Positive for congestion, sinus pressure and sore throat.    Neck: negative. Negative for painful lymph nodes.   Cardiovascular: Negative.  Negative for chest pain and leg swelling.   Eyes: Negative.  Negative for double vision and blurred vision.   Respiratory: Negative.  Negative for cough and shortness of breath.    Gastrointestinal: Negative.  Negative for nausea, vomiting and diarrhea.   Endocrine: negative.   Genitourinary: Negative.  Negative for dysuria, frequency, urgency and history of kidney stones.   Musculoskeletal: Negative.  Negative for joint pain, joint swelling, muscle cramps and muscle ache.   Skin: Negative.  Negative for color change, pale, rash and bruising.   Allergic/Immunologic: Negative.  Negative for seasonal allergies.   Neurological: Positive for headaches. Negative for dizziness, history of vertigo, light-headedness and passing out. "   Hematologic/Lymphatic: Negative.  Negative for swollen lymph nodes.   Psychiatric/Behavioral: Negative.  Negative for nervous/anxious, sleep disturbance and depression. The patient is not nervous/anxious.        Objective:      Physical Exam   Constitutional: She is oriented to person, place, and time. She appears well-developed and well-nourished.   HENT:   Head: Normocephalic and atraumatic.   Right Ear: Hearing, tympanic membrane, external ear and ear canal normal.   Left Ear: Hearing, tympanic membrane, external ear and ear canal normal.   Nose: Mucosal edema (pale turbinates), rhinorrhea and sinus tenderness present. Right sinus exhibits frontal sinus tenderness. Left sinus exhibits frontal sinus tenderness.   Mouth/Throat: Uvula is midline, oropharynx is clear and moist and mucous membranes are normal.   Eyes: Pupils are equal, round, and reactive to light. Conjunctivae and EOM are normal.   Neck: Normal range of motion. Neck supple.   Cardiovascular: Normal rate, regular rhythm, normal heart sounds and intact distal pulses.   Pulmonary/Chest: Effort normal and breath sounds normal.   Abdominal: Soft. Bowel sounds are normal.   Musculoskeletal: Normal range of motion.   Neurological: She is alert and oriented to person, place, and time.   Skin: Skin is warm and dry.       Assessment:       1. Seasonal allergic rhinitis, unspecified trigger    2. Post-nasal drip        Plan:       Patient Instructions   Use an antihistamine such as Claritin, Zyrtec or Allegra to dry you out.     Use pseudoephedrine (behind the counter) to decongest. Pseudoephedrine  30 mg up to 240 mg /day. It can raise your blood pressure and give you palpitations.    Use mucinex (guaifenisin) to break up mucous up to 2400mg/day to loosen any mucous. The mucinex DM pill has a cough suppressant that can be used at night to stop the tickle at the back of your throat.    Use Nasal Saline to mechanically move any post nasal drip from your  eustachian tube or from the back of your throat.    Use Afrin in each nare for no longer than 3 days, as it is addictive. It can also dry out your mucous membranes and cause elevated blood pressure.    Use Flonase 1-2 sprays/nostril per day. It is a local acting steroid nasal spray, if you develop a bloody nose, stop using the medication immediately.    Use warm salt water gargles to ease your throat pain. Warm salt water gargles as needed for sore throat-  1/2 tsp salt to 1 cup warm water, gargle as desired.    Sometimes Nyquil at night is beneficial to help you get some rest, however it is sedating and it does have an antihistamine, and tylenol.  Nasal Allergies: Related Problems  Allergies can cause nasal passages to swell. This narrows the air passages. Allergies also cause increased mucus production in the nose. These changes result in nasal allergy symptoms. Common symptoms include itching, sneezing, stuffy nose, and runny nose. Nasal allergies can also cause problems in other parts of the respiratory system. Some of the more common problems are discussed below. If you think you have any of these problems, talk to your healthcare provider about treatment choices.    Sinus infections  Fluid may be trapped in the sinuses. Bacteria may grow in trapped fluid. This causes sinus infection (sinusitis).  Conjunctivitis  Allergens irritate your eyes, including the lining of the conjunctiva. This causes eyes to become red, itchy, puffy, and watery.  Ear problems  The eustachian tube connects the middle ear to nasal passages.  Allergies can block this tube, and make the ears feel plugged. Fluid may also build up, leading to an ear infection (otitis media).  Nasal polyps  Allergies cause nasal passages to swell. Constant swelling can lead to formation of a sac called a polyp. Polyps can grow large enough to block nasal passages.  Asthma  Asthma is inflammation and swelling of the air passages in the lungs. The symptoms  are wheezing, shortness of breath, coughing, and chest tightness. Allergies, including nasal allergies, are common in people with asthma.  Date Last Reviewed: 9/1/2016  © 6792-1888 [employer]. [address] [place] [address], [place] [address]. All rights reserved. This information is not intended as a substitute for professional medical care. Always follow your healthcare professional's instructions.            Seasonal allergic rhinitis, unspecified trigger  -     betamethasone acetate-betamethasone sodium phosphate injection 6 mg    Post-nasal drip  -     betamethasone acetate-betamethasone sodium phosphate injection 6 mg

## 2019-10-01 ENCOUNTER — TELEPHONE (OUTPATIENT)
Dept: FAMILY MEDICINE | Facility: CLINIC | Age: 49
End: 2019-10-01

## 2019-10-01 DIAGNOSIS — Z12.31 ENCOUNTER FOR SCREENING MAMMOGRAM FOR BREAST CANCER: Primary | ICD-10-CM

## 2019-10-01 NOTE — TELEPHONE ENCOUNTER
----- Message from Rain Louis sent at 10/1/2019 11:30 AM CDT -----  Contact: [phone]/self  Patient requesting orders for a mammogram. Please advise.

## 2019-10-01 NOTE — TELEPHONE ENCOUNTER
Called pt to let her know that her mammo orders are in and that someone would call her to get that scheduled. Pt verbalized understanding.

## 2019-10-09 ENCOUNTER — TELEPHONE (OUTPATIENT)
Dept: CARDIOLOGY | Facility: CLINIC | Age: 49
End: 2019-10-09

## 2019-10-15 ENCOUNTER — PATIENT OUTREACH (OUTPATIENT)
Dept: ADMINISTRATIVE | Facility: OTHER | Age: 49
End: 2019-10-15

## 2019-10-16 ENCOUNTER — OFFICE VISIT (OUTPATIENT)
Dept: CARDIOLOGY | Facility: CLINIC | Age: 49
End: 2019-10-16
Payer: OTHER GOVERNMENT

## 2019-10-16 VITALS
BODY MASS INDEX: 41.62 KG/M2 | HEIGHT: 63 IN | HEART RATE: 70 BPM | SYSTOLIC BLOOD PRESSURE: 126 MMHG | WEIGHT: 234.88 LBS | OXYGEN SATURATION: 99 % | DIASTOLIC BLOOD PRESSURE: 84 MMHG

## 2019-10-16 DIAGNOSIS — R73.03 PREDIABETES: ICD-10-CM

## 2019-10-16 DIAGNOSIS — E66.01 CLASS 3 SEVERE OBESITY WITH SERIOUS COMORBIDITY AND BODY MASS INDEX (BMI) OF 40.0 TO 44.9 IN ADULT, UNSPECIFIED OBESITY TYPE: ICD-10-CM

## 2019-10-16 DIAGNOSIS — I25.10 CAD (CORONARY ARTERY DISEASE): ICD-10-CM

## 2019-10-16 DIAGNOSIS — I25.10 CORONARY ARTERY DISEASE INVOLVING NATIVE CORONARY ARTERY OF NATIVE HEART WITHOUT ANGINA PECTORIS: Primary | ICD-10-CM

## 2019-10-16 DIAGNOSIS — I25.2 HISTORY OF NON-ST ELEVATION MYOCARDIAL INFARCTION (NSTEMI): ICD-10-CM

## 2019-10-16 DIAGNOSIS — I10 ESSENTIAL HYPERTENSION: ICD-10-CM

## 2019-10-16 PROCEDURE — 99213 OFFICE O/P EST LOW 20 MIN: CPT | Mod: PBBFAC,PO | Performed by: INTERNAL MEDICINE

## 2019-10-16 PROCEDURE — 93010 ELECTROCARDIOGRAM REPORT: CPT | Mod: S$PBB,,, | Performed by: INTERNAL MEDICINE

## 2019-10-16 PROCEDURE — 93005 ELECTROCARDIOGRAM TRACING: CPT | Mod: PBBFAC,PO | Performed by: INTERNAL MEDICINE

## 2019-10-16 PROCEDURE — 99214 OFFICE O/P EST MOD 30 MIN: CPT | Mod: S$PBB,,, | Performed by: INTERNAL MEDICINE

## 2019-10-16 PROCEDURE — 99999 PR PBB SHADOW E&M-EST. PATIENT-LVL III: ICD-10-PCS | Mod: PBBFAC,,, | Performed by: INTERNAL MEDICINE

## 2019-10-16 PROCEDURE — 99214 PR OFFICE/OUTPT VISIT, EST, LEVL IV, 30-39 MIN: ICD-10-PCS | Mod: S$PBB,,, | Performed by: INTERNAL MEDICINE

## 2019-10-16 PROCEDURE — 99999 PR PBB SHADOW E&M-EST. PATIENT-LVL III: CPT | Mod: PBBFAC,,, | Performed by: INTERNAL MEDICINE

## 2019-10-16 PROCEDURE — 93010 EKG 12-LEAD: ICD-10-PCS | Mod: S$PBB,,, | Performed by: INTERNAL MEDICINE

## 2019-10-16 NOTE — LETTER
October 16, 2019      [facility]  [address] [place] [address] [address]  [place] [address]  Phone: [phone]       Patient: Lizet Palencia   YOB: 1970  Date of Visit: 10/16/2019    To Whom It May Concern:    Rohan Palencia  was at [facility] on 10/16/2019. She may return to work 10/17/2019 With no restrictions. If you have any questions or concerns, or if I can be of further assistance, please do not hesitate to contact me.    Sincerely,    Dr Echevarriasef

## 2019-10-16 NOTE — PROGRESS NOTES
Subjective:   @Patient ID:  Lizet Palencia is a 49 y.o. female who presents for follow-up of CAD      HPI:     Patient stated that since last visit she has been doing well.   No chest pain, no palpitations.   She is compliant with her medication.   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 She works out about 3-4 times a week.       Prior cardiovascular  Hx  --------------------------------  CAD s/p PCI to NSTEMI in 2015 with 3.5 x 15 resolute ALDA, [facility] in 2016 showed patent stent      - ECHO 1/2015 EF 60-65%             Patient Active Problem List    Diagnosis Date Noted    Essential hypertension 10/16/2019    Cubital tunnel syndrome on left 01/14/2019    Prediabetes 03/14/2018    Long-term use of aspirin therapy 10/25/2017    Diuretic-induced hypokalemia 07/02/2016    History of non-ST elevation myocardial infarction (NSTEMI) 07/02/2016    Coronary artery disease involving native coronary artery of native heart without angina pectoris 07/02/2016    Hepatitis C antibody test positive 05/04/2016     Negative HCV RNA 05/2016, will check again in 3 months but no detectable HCV virus  HCV AB will likely remain positive for life      Plantar fasciitis 10/22/2015     stretching exercises demonstrated, recommended foot wear fitting-- good arch support      Status post coronary artery stent placement 01/19/2015     LCx stent 1/61640      Allergic rhinitis 01/03/2015    Class 3 severe obesity in adult 01/02/2015    Accelerated hypertension 01/01/2015    Uterine fibroid 09/09/2013           Right Arm BP - Sitting: (P) 115/82        LAST HbA1c  Lab Results   Component Value Date    HGBA1C 6.1 (H) 03/17/2018       Lipid panel  Lab Results   Component Value Date    CHOL 148 03/17/2018    CHOL 204 (H) 10/28/2017    CHOL 136 11/05/2016     Lab Results   Component Value Date    HDL 41 03/17/2018    HDL 47 10/28/2017    HDL 41 11/05/2016     Lab Results   Component Value Date    LDLCALC 95.0  03/17/2018    LDLCALC 148.8 10/28/2017    LDLCALC 83.2 11/05/2016     Lab Results   Component Value Date    TRIG 60 03/17/2018    TRIG 41 10/28/2017    TRIG 59 11/05/2016     Lab Results   Component Value Date    CHOLHDL 27.7 03/17/2018    CHOLHDL 23.0 10/28/2017    CHOLHDL 30.1 11/05/2016            Review of Systems   Constitution: Negative for chills and fever.   HENT: Negative for hearing loss and nosebleeds.    Eyes: Negative for blurred vision.   Cardiovascular: Negative for chest pain and palpitations.   Respiratory: Negative for hemoptysis and shortness of breath.    Hematologic/Lymphatic: Negative for bleeding problem.   Skin: Negative for itching.   Musculoskeletal: Negative for falls.   Gastrointestinal: Negative for abdominal pain and hematochezia.   Genitourinary: Negative for hematuria.   Neurological: Negative for dizziness and loss of balance.   Psychiatric/Behavioral: Negative for altered mental status and depression.       Objective:   Physical Exam   Constitutional: She is oriented to person, place, and time. She appears well-developed and well-nourished.   HENT:   Head: Normocephalic and atraumatic.   Eyes: Conjunctivae are normal.   Neck: Neck supple. Carotid bruit is not present.   Cardiovascular: Normal rate, regular rhythm and normal heart sounds. Exam reveals no gallop and no friction rub.   No murmur heard.  Pulmonary/Chest: Effort normal and breath sounds normal. No stridor. No respiratory distress. She has no wheezes.   Neurological: She is alert and oriented to person, place, and time.   Skin: Skin is warm and dry.   Psychiatric: She has a normal mood and affect. Her behavior is normal.       Assessment:     1. Coronary artery disease involving native coronary artery of native heart without angina pectoris    2. History of non-ST elevation myocardial infarction (NSTEMI)    3. Prediabetes    4. Class 3 severe obesity with serious comorbidity and body mass index (BMI) of 40.0 to 44.9 in  adult, unspecified obesity type    5. Essential hypertension        Plan:   - Stable CAD  - Continue GDMT with ASA and Statin  - Repeat lipid profile and if LDL > 60 will add zetia.   - WT loss and life style modification  discussed with the patient.   - BP well controlled continue current tx.     EKG reviewed independently.    Continue with current medical plan and lifestyle changes.  Return sooner for concerns or questions. If symptoms persist go to the ED  I have reviewed all pertinent data including patient's medical history in detail and updated the computerized patient record.     Orders Placed This Encounter   Procedures    Lipid panel     fasting     Standing Status:   Future     Standing Expiration Date:   10/16/2020       Follow up as scheduled.     She expressed verbal understanding and agreed with the plan    Patient's Medications   New Prescriptions    No medications on file   Previous Medications    AMLODIPINE (NORVASC) 10 MG TABLET    Take 1 tablet (10 mg total) by mouth once daily.    ASPIRIN 81 MG CHEW    Take 81 mg by mouth once daily.    ATORVASTATIN (LIPITOR) 80 MG TABLET    TAKE ONE TABLET BY MOUTH EVERY DAY    CARVEDILOL (COREG) 25 MG TABLET    TAKE 1 TABLET BY MOUTH TWICE DAILY WITH MEAL    CETIRIZINE (ZYRTEC) 10 MG TABLET    Take 1 tablet (10 mg total) by mouth every evening.    CHLORTHALIDONE (HYGROTEN) 25 MG TAB    TAKE OINE TABLET BY MOUTH EVERY DAY    FLUTICASONE (FLONASE) 50 MCG/ACTUATION NASAL SPRAY    2 sprays (100 mcg total) by Each Nare route once daily.    LOSARTAN (COZAAR) 100 MG TABLET    TAKE ONE TABLET BY MOUTH EVERY DAY    POTASSIUM CHLORIDE (K-TAB) 20 MEQ    TAKE ONE TABLET BY MOUTH EVERY DAY   Modified Medications    No medications on file   Discontinued Medications    HYDRALAZINE (APRESOLINE) 25 MG TABLET    Take 1 tablet (25 mg total) by mouth every 12 (twelve) hours.    VALACYCLOVIR (VALTREX) 1000 MG TABLET    Take 2 tablets (2,000 mg total) by mouth 2 (two) times daily.  for 1 day

## 2019-10-16 NOTE — PATIENT INSTRUCTIONS
Understanding Coronary Artery Disease (CAD)    To understand coronary artery disease (CAD), you need to know how your heart works. Your heart is a muscle that pumps blood throughout your body. To work right, your heart needs a steady supply of oxygen. It gets this oxygen from blood supplied by the coronary arteries.     Healthy artery   Healthy artery. When a coronary artery is healthy and has no blockages, blood flows through easily. Healthy arteries can easily supply the oxygen-rich blood your heart needs.     Damaged artery   Damaged artery. Coronary artery disease begins when damage to the artery lining leads to the buildup of fat-like substances and cholesterol along the artery wall. This is called plaque. This damage could be caused by things like high blood pressure or smoking. This plaque buildup begins to narrow the arteries carrying blood to the heart. This is called atherosclerosis,     Narrowed artery   Narrowed artery. As more plaque builds up, your artery has trouble supplying blood to your heart muscle when it needs it most, such as during exercise. You may not feel any symptoms when this happens. Or you may feel angina--pressure, tightness, achiness, or pain in your chest, jaw, neck, back, or arm.     Blocked artery   Blocked artery. A piece of plaque may break off and completely block the artery. Or a blood clot may plug the narrowed artery. When this happens, blood flow is blocked from reaching the heart. Without oxygen-rich blood, part of the heart muscle becomes damaged and stops working. You may feel crushing pressure or pain in or around your chest. This is a heart attack (acute myocardial infarction, or AMI) and is a medical emergency.     Date Last Reviewed: 3/28/2016  © 9466-4444 [employer]. [address] [place] [address], [place] [address]. All rights reserved. This information is not intended as a substitute for professional medical care. Always follow your healthcare  professional's instructions.          Aerobic Exercise for a Healthy Heart  Exercise is a lot more than an energy booster and a stress reliever. It also strengthens your heart muscle, lowers your blood pressure and cholesterol, and burns calories. It can also improve your resting muscle tone, and your mood.     Remember, some activity is better than none.    Choose an aerobic activity  Choose an activity that makes your heart and lungs work harder than they do when you rest or walk normally. This aerobic exercise can improve the way your heart and other muscles use oxygen. Make it fun by exercising with a friend and choosing an activity you enjoy. Here are some ideas:  · Walking  · Swimming  · Bicycling  · Stair climbing  · Dancing  · Jogging  · Gardening  Exercise regularly  If you havent been exercising regularly,  get your doctors OK first. Then start slowly.  Here are some tips:  · Begin exercising 3 times a week for 5 to 10 minutes at a time.  · When you feel comfortable, add a few minutes each session.  · Slowly build up to exercising 3 to 4 times each week. Each session should last for 40 minutes, on average, and involve moderate- to vigorous-intensity physical activity.  · If you have been given nitroglycerin, be sure to carry it when you exercise.  · If you get chest pain (angina) when youre exercising, stop what youre doing, take your nitroglycerin, and call your doctor.  Date Last Reviewed: 6/2/2016  © 4890-4004 [employer]. [address] [place] [address], [place] [address]. All rights reserved. This information is not intended as a substitute for professional medical care. Always follow your healthcare professional's instructions.          Eating Heart-Healthy Foods  Eating has a big impact on your heart health. In fact, eating healthier can improve several of your heart risks at once. For instance, it helps you manage weight, cholesterol, and blood pressure. Here are ideas to help you make  heart-healthy changes without giving up all the foods and flavors you love.  Getting started  · Talk with your health care provider about eating plans, such as the DASH or Mediterranean diet. You may also be referred to a dietitian.  · Change a few things at a time. Give yourself time to get used to a few eating changes before adding more.  · Work to create a tasty, healthy eating plan that you can stick to for the rest of your life.    Goals for healthy eating  Below are some tips to improve your eating habits:  · Limit saturated fats and trans fats. Saturated fats raise your levels of cholesterol, so keep these fats to a minimum. They are found in foods such as fatty meats, whole milk, cheese, and palm and coconut oils. Avoid trans fats because they lower good cholesterol as well as raise bad cholesterol. Trans fats are most often found in processed foods.  · Reduce sodium (salt) intake. Eating too much salt may increase your blood pressure. Limit your sodium intake to 2,300 milligrams (mg) per day, or less if your health care provider recommends it. Dining out less often and eating fewer processed foods are two great ways to decrease the amount of salt you consume.  · Managing calories. A calorie is a unit of energy. Your body burns calories for fuel, but if you eat more calories than your body burns, the extras are stored as fat. Your health care provider can help you create a diet plan to manage your calories. This will likely include eating healthier foods as well as exercising regularly. To help you track your progress, keep a diary to record what you eat and how often you exercise.  Choose the right foods  Aim to make these foods staples of your diet. If you have diabetes, you may have different recommendations than what is listed here:  · Fruits and vegetable provide plenty of nutrients without a lot of calories. At meals, fill half your plate with these foods. Split the other half of your plate between  whole grains and lean protein.  · Whole grains are high in fiber and rich in vitamins and nutrients. Good choices include whole-wheat bread, pasta, and brown rice.  · Lean proteins give you nutrition with less fat. Good choices include fish, skinless chicken, and beans.  · Low-fat or nonfat dairy provides nutrients without a lot of fat. Try low-fat or nonfat milk, cheese, or yogurt.  · Healthy fats can be good for you in small amounts. These are unsaturated fats, such as olive oil, nuts, and fish. Try to have at least 2 servings per week of fatty fish such as salmon, sardines, mackerel, rainbow trout, and albacore tuna. These contain omega-3 fatty acids, which are good for your heart. Flaxseed is another source of a heart-healthy fat.  More on heart healthy eating    Read food labels  Healthy eating starts at the grocery store. Be sure to pay attention to food labels on packaged foods. Look for products that are high in fiber and protein, and low in saturated fat, cholesterol, and sodium. Avoid products that contain trans fat. And pay close attention to serving size. For instance, if you plan to eat two servings, double all the numbers on the label.  Prepare food right  A key part of healthy cooking is cutting down on added fat and salt. Look on the internet for lower-fat, lower-sodium recipes. Also, try these tips:  · Remove fat from meat and skin from poultry before cooking.  · Skim fat from the surface of soups and sauces.  · Broil, boil, bake, steam, grill, and microwave food without added fats.  · Choose ingredients that spice up your food without adding calories, fat, or sodium. Try these items: horseradish, hot sauce, lemon, mustard, nonfat salad dressings, and vinegar. For salt-free herbs and spices, try basil, cilantro, cinnamon, pepper, and rosemary.  Date Last Reviewed: 6/25/2015  © 6642-3187 [employer]. [address] [place] [address], [place] [address]. All rights reserved. This information is not  intended as a substitute for professional medical care. Always follow your healthcare professional's instructions.

## 2019-10-17 ENCOUNTER — TELEPHONE (OUTPATIENT)
Dept: CARDIOLOGY | Facility: CLINIC | Age: 49
End: 2019-10-17

## 2019-10-17 NOTE — TELEPHONE ENCOUNTER
----- Message from Anika Ray sent at 10/17/2019  8:35 AM CDT -----  Regarding: EKG ORDER  Please place and link EKG order to the EKG or Doctor appointment scheduled on 10/16/19 thanks. Anika [id number]

## 2019-10-25 ENCOUNTER — LAB VISIT (OUTPATIENT)
Dept: LAB | Facility: HOSPITAL | Age: 49
End: 2019-10-25
Attending: INTERNAL MEDICINE
Payer: OTHER GOVERNMENT

## 2019-10-25 ENCOUNTER — TELEPHONE (OUTPATIENT)
Dept: CARDIOLOGY | Facility: CLINIC | Age: 49
End: 2019-10-25

## 2019-10-25 DIAGNOSIS — I25.10 CORONARY ARTERY DISEASE INVOLVING NATIVE CORONARY ARTERY OF NATIVE HEART WITHOUT ANGINA PECTORIS: ICD-10-CM

## 2019-10-25 DIAGNOSIS — I25.10 CORONARY ARTERY DISEASE INVOLVING NATIVE CORONARY ARTERY OF NATIVE HEART WITHOUT ANGINA PECTORIS: Primary | ICD-10-CM

## 2019-10-25 LAB
CHOLEST SERPL-MCNC: 156 MG/DL (ref 120–199)
CHOLEST/HDLC SERPL: 4.2 {RATIO} (ref 2–5)
HDLC SERPL-MCNC: 37 MG/DL (ref 40–75)
HDLC SERPL: 23.7 % (ref 20–50)
LDLC SERPL CALC-MCNC: 109.2 MG/DL (ref 63–159)
NONHDLC SERPL-MCNC: 119 MG/DL
TRIGL SERPL-MCNC: 49 MG/DL (ref 30–150)

## 2019-10-25 PROCEDURE — 80061 LIPID PANEL: CPT

## 2019-10-25 PROCEDURE — 36415 COLL VENOUS BLD VENIPUNCTURE: CPT | Mod: PO

## 2019-10-25 RX ORDER — EZETIMIBE 10 MG/1
10 TABLET ORAL DAILY
Qty: 90 TABLET | Refills: 3 | Status: SHIPPED | OUTPATIENT
Start: 2019-10-25 | End: 2020-02-24

## 2019-10-25 NOTE — TELEPHONE ENCOUNTER
Patient would like to know what the target range is and how much she is over the target. Please advise

## 2019-10-25 NOTE — TELEPHONE ENCOUNTER
----- Message from Lasha Cox MD sent at 10/25/2019  2:03 PM CDT -----  Please let the patient know I reviewed the patient lipid profile and it is still above  The target. I would like to start Zetia 10 mg. I have sent a rx. Thanks

## 2019-11-12 ENCOUNTER — HOSPITAL ENCOUNTER (OUTPATIENT)
Dept: RADIOLOGY | Facility: HOSPITAL | Age: 49
Discharge: HOME | End: 2019-11-12
Attending: FAMILY MEDICINE
Payer: OTHER GOVERNMENT

## 2019-11-12 ENCOUNTER — TELEPHONE (OUTPATIENT)
Dept: FAMILY MEDICINE | Facility: CLINIC | Age: 49
End: 2019-11-12

## 2019-11-12 DIAGNOSIS — Z12.31 ENCOUNTER FOR SCREENING MAMMOGRAM FOR BREAST CANCER: ICD-10-CM

## 2019-11-12 PROCEDURE — 77063 MAMMO DIGITAL SCREENING BILAT WITH TOMOSYNTHESIS_CAD: ICD-10-PCS | Mod: 26,,, | Performed by: RADIOLOGY

## 2019-11-12 PROCEDURE — 77067 MAMMO DIGITAL SCREENING BILAT WITH TOMOSYNTHESIS_CAD: ICD-10-PCS | Mod: 26,,, | Performed by: RADIOLOGY

## 2019-11-12 PROCEDURE — 77067 SCR MAMMO BI INCL CAD: CPT | Mod: TC,PN

## 2019-11-12 PROCEDURE — 77067 SCR MAMMO BI INCL CAD: CPT | Mod: 26,,, | Performed by: RADIOLOGY

## 2019-11-12 PROCEDURE — 77063 BREAST TOMOSYNTHESIS BI: CPT | Mod: 26,,, | Performed by: RADIOLOGY

## 2019-11-12 NOTE — TELEPHONE ENCOUNTER
Pt was calling to make sure the mammo order was put in I let her know that it was, pt verbalized understanding.

## 2019-11-12 NOTE — TELEPHONE ENCOUNTER
----- Message from Nestor Stauffer sent at 11/12/2019  9:07 AM CST -----  Contact: Patient   Patient would like a call regarding her mammogram referral     [phone]

## 2020-01-02 DIAGNOSIS — I21.4 NSTEMI (NON-ST ELEVATED MYOCARDIAL INFARCTION): ICD-10-CM

## 2020-01-02 DIAGNOSIS — I25.10 CORONARY ARTERY DISEASE INVOLVING NATIVE CORONARY ARTERY OF NATIVE HEART WITHOUT ANGINA PECTORIS: ICD-10-CM

## 2020-01-02 DIAGNOSIS — Z95.5 STATUS POST CORONARY ARTERY STENT PLACEMENT: ICD-10-CM

## 2020-01-02 DIAGNOSIS — I10 ACCELERATED HYPERTENSION: ICD-10-CM

## 2020-01-02 RX ORDER — LOSARTAN POTASSIUM 100 MG/1
100 TABLET ORAL DAILY
Qty: 30 TABLET | Refills: 0 | Status: SHIPPED | OUTPATIENT
Start: 2020-01-02 | End: 2020-02-24 | Stop reason: SDUPTHER

## 2020-01-02 RX ORDER — POTASSIUM CHLORIDE 1500 MG/1
TABLET, EXTENDED RELEASE ORAL
Qty: 30 TABLET | Refills: 0 | Status: SHIPPED | OUTPATIENT
Start: 2020-01-02 | End: 2020-02-24 | Stop reason: SDUPTHER

## 2020-01-02 RX ORDER — ATORVASTATIN CALCIUM 80 MG/1
80 TABLET, FILM COATED ORAL DAILY
Qty: 30 TABLET | Refills: 0 | Status: SHIPPED | OUTPATIENT
Start: 2020-01-02 | End: 2020-02-24 | Stop reason: SDUPTHER

## 2020-01-02 RX ORDER — CHLORTHALIDONE 25 MG/1
TABLET ORAL
Qty: 30 TABLET | Refills: 0 | Status: SHIPPED | OUTPATIENT
Start: 2020-01-02 | End: 2020-02-24 | Stop reason: SDUPTHER

## 2020-01-03 RX ORDER — CHLORTHALIDONE 25 MG/1
TABLET ORAL
Qty: 90 TABLET | OUTPATIENT
Start: 2020-01-03

## 2020-01-03 RX ORDER — POTASSIUM CHLORIDE 1500 MG/1
TABLET, EXTENDED RELEASE ORAL
Qty: 90 TABLET | OUTPATIENT
Start: 2020-01-03

## 2020-01-03 RX ORDER — ATORVASTATIN CALCIUM 80 MG/1
TABLET, FILM COATED ORAL
Qty: 90 TABLET | OUTPATIENT
Start: 2020-01-03

## 2020-01-03 RX ORDER — LOSARTAN POTASSIUM 100 MG/1
TABLET ORAL
Qty: 90 TABLET | OUTPATIENT
Start: 2020-01-03

## 2020-02-24 ENCOUNTER — OFFICE VISIT (OUTPATIENT)
Dept: FAMILY MEDICINE | Facility: CLINIC | Age: 50
End: 2020-02-24
Payer: OTHER GOVERNMENT

## 2020-02-24 VITALS
BODY MASS INDEX: 35.1 KG/M2 | HEIGHT: 69 IN | WEIGHT: 237 LBS | OXYGEN SATURATION: 98 % | HEART RATE: 62 BPM | SYSTOLIC BLOOD PRESSURE: 142 MMHG | DIASTOLIC BLOOD PRESSURE: 102 MMHG

## 2020-02-24 DIAGNOSIS — E87.6 DIURETIC-INDUCED HYPOKALEMIA: ICD-10-CM

## 2020-02-24 DIAGNOSIS — D25.9 UTERINE LEIOMYOMA, UNSPECIFIED LOCATION: ICD-10-CM

## 2020-02-24 DIAGNOSIS — Z00.00 ROUTINE GENERAL MEDICAL EXAMINATION AT A HEALTH CARE FACILITY: Primary | ICD-10-CM

## 2020-02-24 DIAGNOSIS — T50.2X5A DIURETIC-INDUCED HYPOKALEMIA: ICD-10-CM

## 2020-02-24 DIAGNOSIS — I10 ESSENTIAL HYPERTENSION: ICD-10-CM

## 2020-02-24 DIAGNOSIS — E66.01 CLASS 2 SEVERE OBESITY DUE TO EXCESS CALORIES WITH SERIOUS COMORBIDITY AND BODY MASS INDEX (BMI) OF 36.0 TO 36.9 IN ADULT: ICD-10-CM

## 2020-02-24 DIAGNOSIS — Z79.82 LONG-TERM USE OF ASPIRIN THERAPY: ICD-10-CM

## 2020-02-24 DIAGNOSIS — R73.03 PREDIABETES: ICD-10-CM

## 2020-02-24 DIAGNOSIS — R76.8 HEPATITIS C ANTIBODY TEST POSITIVE: ICD-10-CM

## 2020-02-24 DIAGNOSIS — Z95.5 STATUS POST CORONARY ARTERY STENT PLACEMENT: ICD-10-CM

## 2020-02-24 DIAGNOSIS — I25.10 CORONARY ARTERY DISEASE INVOLVING NATIVE CORONARY ARTERY OF NATIVE HEART WITHOUT ANGINA PECTORIS: ICD-10-CM

## 2020-02-24 DIAGNOSIS — N92.6 IRREGULAR BLEEDING: ICD-10-CM

## 2020-02-24 DIAGNOSIS — I25.2 HISTORY OF NON-ST ELEVATION MYOCARDIAL INFARCTION (NSTEMI): ICD-10-CM

## 2020-02-24 DIAGNOSIS — I10 ACCELERATED HYPERTENSION: ICD-10-CM

## 2020-02-24 PROCEDURE — 99999 PR PBB SHADOW E&M-EST. PATIENT-LVL IV: ICD-10-PCS | Mod: PBBFAC,,, | Performed by: FAMILY MEDICINE

## 2020-02-24 PROCEDURE — 99999 PR PBB SHADOW E&M-EST. PATIENT-LVL IV: CPT | Mod: PBBFAC,,, | Performed by: FAMILY MEDICINE

## 2020-02-24 PROCEDURE — 99396 PREV VISIT EST AGE 40-64: CPT | Mod: S$PBB,,, | Performed by: FAMILY MEDICINE

## 2020-02-24 PROCEDURE — 99214 OFFICE O/P EST MOD 30 MIN: CPT | Mod: PBBFAC,PO | Performed by: FAMILY MEDICINE

## 2020-02-24 PROCEDURE — 99396 PR PREVENTIVE VISIT,EST,40-64: ICD-10-PCS | Mod: S$PBB,,, | Performed by: FAMILY MEDICINE

## 2020-02-24 RX ORDER — POTASSIUM CHLORIDE 1500 MG/1
20 TABLET, EXTENDED RELEASE ORAL DAILY
Qty: 90 TABLET | Refills: 4 | Status: SHIPPED | OUTPATIENT
Start: 2020-02-24 | End: 2020-08-05

## 2020-02-24 RX ORDER — CARVEDILOL 25 MG/1
TABLET ORAL
Qty: 180 TABLET | Refills: 4 | Status: SHIPPED | OUTPATIENT
Start: 2020-02-24 | End: 2021-04-28 | Stop reason: SDUPTHER

## 2020-02-24 RX ORDER — LOSARTAN POTASSIUM 100 MG/1
100 TABLET ORAL DAILY
Qty: 90 TABLET | Refills: 4 | Status: SHIPPED | OUTPATIENT
Start: 2020-02-24 | End: 2021-04-28 | Stop reason: SDUPTHER

## 2020-02-24 RX ORDER — CHLORTHALIDONE 25 MG/1
25 TABLET ORAL DAILY
Qty: 90 TABLET | Refills: 4 | Status: SHIPPED | OUTPATIENT
Start: 2020-02-24 | End: 2021-04-28 | Stop reason: SDUPTHER

## 2020-02-24 RX ORDER — AMLODIPINE BESYLATE 10 MG/1
10 TABLET ORAL DAILY
Qty: 90 TABLET | Refills: 4 | Status: SHIPPED | OUTPATIENT
Start: 2020-02-24 | End: 2021-04-28 | Stop reason: SDUPTHER

## 2020-02-24 RX ORDER — ATORVASTATIN CALCIUM 80 MG/1
80 TABLET, FILM COATED ORAL DAILY
Qty: 90 TABLET | Refills: 4 | Status: SHIPPED | OUTPATIENT
Start: 2020-02-24 | End: 2021-04-28 | Stop reason: SDUPTHER

## 2020-02-24 NOTE — PROGRESS NOTES
Office Visit    Patient Name: Lizet Palencia    : 1970  MRN: [id number]    Subjective:  Lizet is a 49 y.o. female who presents today for:    Annual Exam    Lizet Palencia presents today for annual wellness exam and monitoring of chronic conditions that include CAD w/ h/o stent/NSTEMI, HTN, prediabetes(A1c 6.1 3/17/18), HLD, Obesity.     She is premenopausal and having overall regular periods-- most recent couple were irregular.  No hot flashes. She has a gynecologist and is up to date with pap-- pap/HPV by Dr Crocker 2015 & PAP WNL 18 per Dr Rodriguez.      They have been feeling overall well. Some mild right ear/TNJ area pressure but no significant hearing change or ringing or URI symptoms. Does have some intermittent trouble breathing through her nose.        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  Weight is overall stable at BMI 36.       Immunizations: declines FLU shot, TDaP 10/25/2017     Screening Tests: mammogram 19 -- repeat 1 year, normal PAP/HPV 2015 & normal PAP 18-- repeat 3 years     Eye/Dental: reports up to date with both      2016: angiogram negative for obstructive CAD, follows with cardiology now Dr Cox- most recently seen 10/16/19-- F/U  6 months    Past Medical History  Past Medical History:   Diagnosis Date    Accelerated hypertension 2015    Allergic rhinitis 1/3/2015    Coronary artery disease 2016    Coronary artery disease involving native coronary artery of native heart without angina pectoris 2016    Hepatitis C antibody test positive 2016    Negative HCV RNA 2016, will check again in 3 months but  no detectable HCV virus HCV AB will likely remain positive for life    History of non-ST elevation myocardial infarction (NSTEMI) 2016    Menorrhagia     Obesity (BMI 30-39.9) 2015    Prediabetes 3/14/2018    Status post coronary artery stent placement 2015    LCx stent         Past Surgical History  Past Surgical History:   Procedure Laterality Date     SECTION, CLASSIC      x3    CORONARY STENT PLACEMENT      D&C Hysteroscopy      right arm surgery  2009    TUBAL LIGATION         Family History  Family History   Problem Relation Age of Onset    Hypertension Mother     Diabetes Maternal Aunt     Hypertension Maternal Aunt     Cancer Neg Hx     Heart disease Neg Hx        Social History  Social History     Socioeconomic History    Marital status:      Spouse name: Not on file    Number of children: Not on file    Years of education: Not on file    Highest education level: Not on file   Occupational History    Not on file   Social Needs    Financial resource strain: Not on file    Food insecurity:     Worry: Not on file     Inability: Not on file    Transportation needs:     Medical: Not on file     Non-medical: Not on file   Tobacco Use    Smoking status: Never Smoker    Smokeless tobacco: Never Used   Substance and Sexual Activity    Alcohol use: Yes     Comment: social    Drug use: No    Sexual activity: Yes     Partners: Male     Birth control/protection: Surgical   Lifestyle    Physical activity:     Days per week: Not on file     Minutes per session: Not on file    Stress: Not on file   Relationships    Social connections:     Talks on phone: Not on file     Gets together: Not on file     Attends Sikh service: Not on file     Active member of club or organization: Not on file     Attends meetings of clubs or organizations: Not on file     Relationship status: Not on file   Other Topics Concern    Not on file   Social History Narrative     "Not on file       Current Medications  Medications reviewed and updated.     Allergies   Review of patient's allergies indicates:  No Known Allergies    Review of Systems (Pertinent positives)  Review of Systems   Constitutional: Negative for unexpected weight change.   HENT: Positive for congestion and ear pain (right ear). Negative for sinus pressure, sinus pain and sore throat.    Eyes: Negative for visual disturbance.   Respiratory: Negative for chest tightness and shortness of breath.    Cardiovascular: Negative for chest pain, palpitations and leg swelling.   Gastrointestinal: Negative for constipation and diarrhea.   Endocrine: Negative for heat intolerance.   Genitourinary: Positive for menstrual problem (mild recent irregularity). Negative for dysuria.   Allergic/Immunologic: Negative for environmental allergies.   Neurological: Negative for dizziness, light-headedness and headaches.   Psychiatric/Behavioral: Negative for sleep disturbance.       BP (!) 142/102   Pulse 62   Ht 5' 8.75" (1.746 m)   Wt 107.5 kg (236 lb 15.9 oz)   SpO2 98%   BMI 35.25 kg/m²     Physical Exam   Constitutional: She is oriented to person, place, and time. She appears well-developed and well-nourished. No distress.   HENT:   Head: Normocephalic and atraumatic.   Right Ear: Ear canal normal. Tympanic membrane is not erythematous and not bulging.   Left Ear: Ear canal normal. Tympanic membrane is not erythematous and not bulging.   Nose: Mucosal edema present. No rhinorrhea. Right sinus exhibits no maxillary sinus tenderness and no frontal sinus tenderness. Left sinus exhibits no maxillary sinus tenderness and no frontal sinus tenderness.   Mouth/Throat: No oropharyngeal exudate or posterior oropharyngeal erythema.   Eyes: Conjunctivae are normal.   Neck: Carotid bruit is not present. No thyroid mass and no thyromegaly present.   Cardiovascular: Normal rate, regular rhythm and normal heart sounds.   No murmur heard.  Pulses:    "    Dorsalis pedis pulses are 2+ on the right side, and 2+ on the left side.   Pulmonary/Chest: Effort normal and breath sounds normal. No respiratory distress.   Abdominal: Soft. Bowel sounds are normal. She exhibits no distension and no mass. There is no hepatosplenomegaly. There is no tenderness.   Musculoskeletal: Normal range of motion.   Lymphadenopathy:     She has no cervical adenopathy.   Neurological: She is alert and oriented to person, place, and time.   Skin: Skin is warm and dry. No rash noted.   Psychiatric: She has a normal mood and affect.   Vitals reviewed.        Assessment/Plan:  Lizet Palencia is a 49 y.o. female who presents today for :    Lizet was seen today for annual exam.    Diagnoses and all orders for this visit:    Routine general medical examination at a health care facility  Comments:  HEALTH MAINTENANCE REVIEWED: TDAP UTD & FLU SHOT DECLINED. MAMMO/PAP UTD- PAP DUE IN UPCOMING YR. ADVISED ON DIET/EXERCISE/SLEEP, EYE/DENTAL EXAMS    Class 2 severe obesity due to excess calories with serious comorbidity and body mass index (BMI) of 36.0 to 36.9 in adult  Comments:  discussed low carb diet, eating a more consistent healthy breakfast, going to the gym to exercise and /or brisk walking     Coronary artery disease involving native coronary artery of native heart without angina pectoris  Comments:  MED MGMT W/ LIPITOR 80/ASA 81. BP CONTROL WITH COZAAR 100/ HYGROTEN 25/AMLODIPINE 10/ COREG 25 BID.   Orders:  -     atorvastatin (LIPITOR) 80 MG tablet; Take 1 tablet (80 mg total) by mouth once daily.  -     carvediloL (COREG) 25 MG tablet; TAKE 1 TABLET BY MOUTH TWICE DAILY WITH MEAL  -     Ambulatory referral/consult to Cardiology; Future    History of non-ST elevation myocardial infarction (NSTEMI)  Comments:  ON BBLOCKER, ARB, STATIN, ASA, DUE FOR CARDIOLOGY FOLLOW UP 4/2020- REFERRAL PLACED TO DR DUNN    Status post coronary artery stent placement  -     atorvastatin (LIPITOR) 80  MG tablet; Take 1 tablet (80 mg total) by mouth once daily.  -     losartan (COZAAR) 100 MG tablet; Take 1 tablet (100 mg total) by mouth once daily.    Long-term use of aspirin therapy    Prediabetes  Comments:  A1C PREVIOUSLY 6.1, RECHECK WITH LABS  Orders:  -     atorvastatin (LIPITOR) 80 MG tablet; Take 1 tablet (80 mg total) by mouth once daily.    Accelerated hypertension    Essential hypertension  Comments:  COUNSELLED ON IMPORTANCE OF EVERY DAY MEDICATION COMPLIANCE (DID NOT TAKE MEDS YET TODAY). WILL HAVE BP RECHECKED AT CARDIOLOGY OV  Orders:  -     amLODIPine (NORVASC) 10 MG tablet; Take 1 tablet (10 mg total) by mouth once daily.  -     carvediloL (COREG) 25 MG tablet; TAKE 1 TABLET BY MOUTH TWICE DAILY WITH MEAL  -     chlorthalidone (HYGROTEN) 25 MG Tab; Take 1 tablet (25 mg total) by mouth once daily.  -     losartan (COZAAR) 100 MG tablet; Take 1 tablet (100 mg total) by mouth once daily.    Diuretic-induced hypokalemia  -     potassium chloride (K-TAB) 20 mEq; Take 1 tablet (20 mEq total) by mouth once daily.    Irregular bleeding  Comments:  only recently irregular cycle, no associated hot flashes. check TSH with labs, monitor, pelvic exam/pap at next OV    Uterine leiomyoma, unspecified location    Hepatitis C antibody test positive  Comments:  no detecatable viral load on previous testing            ICD-10-CM ICD-9-CM    1. Routine general medical examination at a health care facility Z00.00 V70.0     HEALTH MAINTENANCE REVIEWED: TDAP UTD & FLU SHOT DECLINED. MAMMO/PAP UTD- PAP DUE IN UPCOMING YR. ADVISED ON DIET/EXERCISE/SLEEP, EYE/DENTAL EXAMS   2. Class 2 severe obesity due to excess calories with serious comorbidity and body mass index (BMI) of 36.0 to 36.9 in adult E66.01 278.01     Z68.36 V85.36     discussed low carb diet, eating a more consistent healthy breakfast, going to the gym to exercise and /or brisk walking    3. Coronary artery disease involving native coronary artery of native  heart without angina pectoris I25.10 414.01 atorvastatin (LIPITOR) 80 MG tablet      carvediloL (COREG) 25 MG tablet      Ambulatory referral/consult to Cardiology    MED MGMT W/ LIPITOR 80/ASA 81. BP CONTROL WITH COZAAR 100/ HYGROTEN 25/AMLODIPINE 10/ COREG 25 BID.    4. History of non-ST elevation myocardial infarction (NSTEMI) I25.2 412     ON BBLOCKER, ARB, STATIN, ASA, DUE FOR CARDIOLOGY FOLLOW UP 4/2020- REFERRAL PLACED TO DR DUNN   5. Status post coronary artery stent placement Z95.5 V45.82 atorvastatin (LIPITOR) 80 MG tablet      losartan (COZAAR) 100 MG tablet   6. Long-term use of aspirin therapy Z79.82 V58.66    7. Prediabetes R73.03 790.29 atorvastatin (LIPITOR) 80 MG tablet    A1C PREVIOUSLY 6.1, RECHECK WITH LABS   8. Accelerated hypertension I10 401.0    9. Essential hypertension I10 401.9 amLODIPine (NORVASC) 10 MG tablet      carvediloL (COREG) 25 MG tablet      chlorthalidone (HYGROTEN) 25 MG Tab      losartan (COZAAR) 100 MG tablet    COUNSELLED ON IMPORTANCE OF EVERY DAY MEDICATION COMPLIANCE (DID NOT TAKE MEDS YET TODAY). WILL HAVE BP RECHECKED AT CARDIOLOGY OV   10. Diuretic-induced hypokalemia E87.6 276.8 potassium chloride (K-TAB) 20 mEq    T50.2X5A E944.4    11. Irregular bleeding N92.6 626.4     only recently irregular cycle, no associated hot flashes. check TSH with labs, monitor, pelvic exam/pap at next OV   12. Uterine leiomyoma, unspecified location D25.9 218.9    13. Hepatitis C antibody test positive R76.8 795.79     no detecatable viral load on previous testing       There are no Patient Instructions on file for this visit.      Follow up in about 6 months (around 8/24/2020) for to follow up on lab results, return as needed for new concerns.

## 2020-02-28 ENCOUNTER — LAB VISIT (OUTPATIENT)
Dept: LAB | Facility: HOSPITAL | Age: 50
End: 2020-02-28
Attending: FAMILY MEDICINE
Payer: OTHER GOVERNMENT

## 2020-02-28 DIAGNOSIS — T50.2X5A DIURETIC-INDUCED HYPOKALEMIA: ICD-10-CM

## 2020-02-28 DIAGNOSIS — R73.03 PREDIABETES: ICD-10-CM

## 2020-02-28 DIAGNOSIS — E87.6 DIURETIC-INDUCED HYPOKALEMIA: ICD-10-CM

## 2020-02-28 DIAGNOSIS — I25.10 CORONARY ARTERY DISEASE INVOLVING NATIVE CORONARY ARTERY OF NATIVE HEART WITHOUT ANGINA PECTORIS: ICD-10-CM

## 2020-02-28 DIAGNOSIS — Z79.899 MEDICATION MANAGEMENT: ICD-10-CM

## 2020-02-28 DIAGNOSIS — I10 ACCELERATED HYPERTENSION: ICD-10-CM

## 2020-02-28 LAB
25(OH)D3+25(OH)D2 SERPL-MCNC: 8 NG/ML (ref 30–96)
ALBUMIN SERPL BCP-MCNC: 3.5 G/DL (ref 3.5–5.2)
ALP SERPL-CCNC: 60 U/L (ref 55–135)
ALT SERPL W/O P-5'-P-CCNC: 12 U/L (ref 10–44)
ANION GAP SERPL CALC-SCNC: 6 MMOL/L (ref 8–16)
AST SERPL-CCNC: 14 U/L (ref 10–40)
BASOPHILS # BLD AUTO: 0.05 K/UL (ref 0–0.2)
BASOPHILS NFR BLD: 1 % (ref 0–1.9)
BILIRUB SERPL-MCNC: 0.3 MG/DL (ref 0.1–1)
BUN SERPL-MCNC: 19 MG/DL (ref 6–20)
CALCIUM SERPL-MCNC: 9.2 MG/DL (ref 8.7–10.5)
CHLORIDE SERPL-SCNC: 106 MMOL/L (ref 95–110)
CHOLEST SERPL-MCNC: 160 MG/DL (ref 120–199)
CHOLEST/HDLC SERPL: 3.3 {RATIO} (ref 2–5)
CO2 SERPL-SCNC: 26 MMOL/L (ref 23–29)
CREAT SERPL-MCNC: 0.9 MG/DL (ref 0.5–1.4)
DIFFERENTIAL METHOD: ABNORMAL
EOSINOPHIL # BLD AUTO: 0.1 K/UL (ref 0–0.5)
EOSINOPHIL NFR BLD: 1 % (ref 0–8)
ERYTHROCYTE [DISTWIDTH] IN BLOOD BY AUTOMATED COUNT: 13.4 % (ref 11.5–14.5)
EST. GFR  (AFRICAN AMERICAN): >60 ML/MIN/1.73 M^2
EST. GFR  (NON AFRICAN AMERICAN): >60 ML/MIN/1.73 M^2
ESTIMATED AVG GLUCOSE: 128 MG/DL (ref 68–131)
GLUCOSE SERPL-MCNC: 112 MG/DL (ref 70–110)
HBA1C MFR BLD HPLC: 6.1 % (ref 4–5.6)
HCT VFR BLD AUTO: 44.5 % (ref 37–48.5)
HDLC SERPL-MCNC: 48 MG/DL (ref 40–75)
HDLC SERPL: 30 % (ref 20–50)
HGB BLD-MCNC: 13.7 G/DL (ref 12–16)
IMM GRANULOCYTES # BLD AUTO: 0.01 K/UL (ref 0–0.04)
IMM GRANULOCYTES NFR BLD AUTO: 0.2 % (ref 0–0.5)
LDLC SERPL CALC-MCNC: 99 MG/DL (ref 63–159)
LYMPHOCYTES # BLD AUTO: 1.9 K/UL (ref 1–4.8)
LYMPHOCYTES NFR BLD: 38.8 % (ref 18–48)
MAGNESIUM SERPL-MCNC: 1.8 MG/DL (ref 1.6–2.6)
MCH RBC QN AUTO: 30.4 PG (ref 27–31)
MCHC RBC AUTO-ENTMCNC: 30.8 G/DL (ref 32–36)
MCV RBC AUTO: 99 FL (ref 82–98)
MONOCYTES # BLD AUTO: 0.6 K/UL (ref 0.3–1)
MONOCYTES NFR BLD: 12.4 % (ref 4–15)
NEUTROPHILS # BLD AUTO: 2.2 K/UL (ref 1.8–7.7)
NEUTROPHILS NFR BLD: 46.6 % (ref 38–73)
NONHDLC SERPL-MCNC: 112 MG/DL
NRBC BLD-RTO: 0 /100 WBC
PLATELET # BLD AUTO: 176 K/UL (ref 150–350)
PMV BLD AUTO: 11.8 FL (ref 9.2–12.9)
POTASSIUM SERPL-SCNC: 4 MMOL/L (ref 3.5–5.1)
PROT SERPL-MCNC: 7.2 G/DL (ref 6–8.4)
RBC # BLD AUTO: 4.5 M/UL (ref 4–5.4)
SODIUM SERPL-SCNC: 138 MMOL/L (ref 136–145)
TRIGL SERPL-MCNC: 65 MG/DL (ref 30–150)
TSH SERPL DL<=0.005 MIU/L-ACNC: 0.86 UIU/ML (ref 0.4–4)
WBC # BLD AUTO: 4.77 K/UL (ref 3.9–12.7)

## 2020-02-28 PROCEDURE — 80053 COMPREHEN METABOLIC PANEL: CPT

## 2020-02-28 PROCEDURE — 83735 ASSAY OF MAGNESIUM: CPT

## 2020-02-28 PROCEDURE — 84443 ASSAY THYROID STIM HORMONE: CPT

## 2020-02-28 PROCEDURE — 85025 COMPLETE CBC W/AUTO DIFF WBC: CPT

## 2020-02-28 PROCEDURE — 83036 HEMOGLOBIN GLYCOSYLATED A1C: CPT

## 2020-02-28 PROCEDURE — 80061 LIPID PANEL: CPT

## 2020-02-28 PROCEDURE — 36415 COLL VENOUS BLD VENIPUNCTURE: CPT | Mod: PO

## 2020-02-28 PROCEDURE — 82306 VITAMIN D 25 HYDROXY: CPT

## 2020-03-02 ENCOUNTER — TELEPHONE (OUTPATIENT)
Dept: FAMILY MEDICINE | Facility: CLINIC | Age: 50
End: 2020-03-02

## 2020-03-02 DIAGNOSIS — R73.03 PREDIABETES: Primary | ICD-10-CM

## 2020-03-02 DIAGNOSIS — E87.6 DIURETIC-INDUCED HYPOKALEMIA: ICD-10-CM

## 2020-03-02 DIAGNOSIS — I10 ESSENTIAL HYPERTENSION: ICD-10-CM

## 2020-03-02 DIAGNOSIS — E55.9 VITAMIN D DEFICIENCY: ICD-10-CM

## 2020-03-02 DIAGNOSIS — T50.2X5A DIURETIC-INDUCED HYPOKALEMIA: ICD-10-CM

## 2020-03-02 NOTE — TELEPHONE ENCOUNTER
----- Message from Marta Louis MD sent at 3/2/2020  8:56 AM CST -----  Lizet- your labs are overall stable, though your vitamin D level is very low, so IP would like you to start a combination of weekly prescription vitamin D2 that I am sending in ALONG WITH DAILY OVER THE COUNTER VITAMIN D3 2000 IU daily with breakfast. The A1c is still in the pre-daibetic range, so it is very important to put attention into diet/exercise/weight loss so that this will come down. I would advise adding the vitamin D and then please schedule her for a 6 month follow up in August with labs prior, thanks

## 2020-03-04 ENCOUNTER — PATIENT MESSAGE (OUTPATIENT)
Dept: FAMILY MEDICINE | Facility: CLINIC | Age: 50
End: 2020-03-04

## 2020-03-04 DIAGNOSIS — Z11.3 SCREEN FOR STD (SEXUALLY TRANSMITTED DISEASE): ICD-10-CM

## 2020-03-04 DIAGNOSIS — R76.8 HEPATITIS C ANTIBODY TEST POSITIVE: Primary | ICD-10-CM

## 2020-03-04 NOTE — TELEPHONE ENCOUNTER
Here are blood and urine orders for nonfasting labs for STD screening.  Please schedule at patient's convenience, thank you.

## 2020-03-05 ENCOUNTER — PATIENT OUTREACH (OUTPATIENT)
Dept: ADMINISTRATIVE | Facility: OTHER | Age: 50
End: 2020-03-05

## 2020-03-10 ENCOUNTER — OFFICE VISIT (OUTPATIENT)
Dept: CARDIOLOGY | Facility: CLINIC | Age: 50
End: 2020-03-10
Payer: OTHER GOVERNMENT

## 2020-03-10 VITALS
BODY MASS INDEX: 36.02 KG/M2 | HEART RATE: 78 BPM | HEIGHT: 68 IN | SYSTOLIC BLOOD PRESSURE: 147 MMHG | OXYGEN SATURATION: 95 % | WEIGHT: 237.63 LBS | DIASTOLIC BLOOD PRESSURE: 102 MMHG

## 2020-03-10 DIAGNOSIS — G89.29 CHRONIC CHEST PAIN: ICD-10-CM

## 2020-03-10 DIAGNOSIS — I25.10 CORONARY ARTERY DISEASE INVOLVING NATIVE CORONARY ARTERY OF NATIVE HEART WITHOUT ANGINA PECTORIS: Primary | ICD-10-CM

## 2020-03-10 DIAGNOSIS — I25.10 CAD (CORONARY ARTERY DISEASE): ICD-10-CM

## 2020-03-10 DIAGNOSIS — I25.2 HISTORY OF NON-ST ELEVATION MYOCARDIAL INFARCTION (NSTEMI): ICD-10-CM

## 2020-03-10 DIAGNOSIS — R07.9 CHRONIC CHEST PAIN: ICD-10-CM

## 2020-03-10 DIAGNOSIS — Z95.5 STATUS POST CORONARY ARTERY STENT PLACEMENT: ICD-10-CM

## 2020-03-10 DIAGNOSIS — I10 ESSENTIAL HYPERTENSION: ICD-10-CM

## 2020-03-10 DIAGNOSIS — E66.01 CLASS 2 SEVERE OBESITY DUE TO EXCESS CALORIES WITH SERIOUS COMORBIDITY AND BODY MASS INDEX (BMI) OF 36.0 TO 36.9 IN ADULT: ICD-10-CM

## 2020-03-10 PROCEDURE — 93010 EKG 12-LEAD: ICD-10-PCS | Mod: S$PBB,,, | Performed by: INTERNAL MEDICINE

## 2020-03-10 PROCEDURE — 99999 PR PBB SHADOW E&M-EST. PATIENT-LVL IV: ICD-10-PCS | Mod: PBBFAC,,, | Performed by: INTERNAL MEDICINE

## 2020-03-10 PROCEDURE — 99999 PR PBB SHADOW E&M-EST. PATIENT-LVL IV: CPT | Mod: PBBFAC,,, | Performed by: INTERNAL MEDICINE

## 2020-03-10 PROCEDURE — 99214 OFFICE O/P EST MOD 30 MIN: CPT | Mod: PBBFAC,PO,25 | Performed by: INTERNAL MEDICINE

## 2020-03-10 PROCEDURE — 99214 PR OFFICE/OUTPT VISIT, EST, LEVL IV, 30-39 MIN: ICD-10-PCS | Mod: S$PBB,,, | Performed by: INTERNAL MEDICINE

## 2020-03-10 PROCEDURE — 93005 ELECTROCARDIOGRAM TRACING: CPT | Mod: PBBFAC,PO | Performed by: INTERNAL MEDICINE

## 2020-03-10 PROCEDURE — 93010 ELECTROCARDIOGRAM REPORT: CPT | Mod: S$PBB,,, | Performed by: INTERNAL MEDICINE

## 2020-03-10 PROCEDURE — 99214 OFFICE O/P EST MOD 30 MIN: CPT | Mod: S$PBB,,, | Performed by: INTERNAL MEDICINE

## 2020-03-10 NOTE — LETTER
March 10, 2020      Marta Louis MD  [address]  [address]  [facility] [place] [facility] [address]           [facility]  [address]  [facility] [place] [address]  Phone: [phone]          Patient: Lizet Palencia   MR Number: [id number]   YOB: 1970   Date of Visit: 3/10/2020       Dear Dr. Marta Louis:    Thank you for referring Lizet Palencia to me for evaluation. Attached you will find relevant portions of my assessment and plan of care.    If you have questions, please do not hesitate to call me. I look forward to following Lizet Palencia along with you.    Sincerely,    Lasha Cox MD    Enclosure  CC:  No Recipients    If you would like to receive this communication electronically, please contact [email or web address] or [phone] to request more information on [employer] Link access.    For providers and/or their staff who would like to refer a patient to [facility], please contact us through our one-stop-shop provider referral line, [facility] , at [phone].    If you feel you have received this communication in error or would no longer like to receive these types of communications, please e-mail [email or web address]

## 2020-03-10 NOTE — PROGRESS NOTES
Subjective:   @Patient ID:  Lizet Palencia is a 50 y.o. female who presents for follow-up of CAD      HPI:   Patient is here for follow up  For the last 3 weeks she had intermittent episodes of chest pain, mainly toward the left side of the chest. Sharp, lasts < 1 min. Happens about one time every 4 days. No know exacerbating factors. Different than her prior MI symptoms.     Since yesterday, she has been having chills, and headches,  Cough. No fever. Increase in urinary frequency. No dysuria. No recent travel, no sick contacts that she is aware of. She took advil today. No sore throat.     BP is elevated today, she just took Coreg. She didn't take any other medication.     She is compliant with her medication.   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 She works out about 3-4 times a week.       Prior cardiovascular  Hx  --------------------------------  CAD s/p PCI LCX to NSTEMI in 2015 with 3.5 x 15 resolute ALDA, [facility] in 2016 showed patent stent      - ECHO 1/2015 EF 60-65%    Patient Active Problem List    Diagnosis Date Noted    Vitamin D deficiency 03/02/2020    Essential hypertension 10/16/2019    Cubital tunnel syndrome on left 01/14/2019    Prediabetes 03/14/2018    Long-term use of aspirin therapy 10/25/2017    Diuretic-induced hypokalemia 07/02/2016    History of non-ST elevation myocardial infarction (NSTEMI) 07/02/2016    Coronary artery disease involving native coronary artery of native heart without angina pectoris 07/02/2016    Hepatitis C antibody test positive 05/04/2016     Negative HCV RNA 05/2016, will check again in 3 months but no detectable HCV virus  HCV AB will likely remain positive for life  HCV RNA again undetectable 10/2/17      Plantar fasciitis 10/22/2015     stretching exercises demonstrated, recommended foot wear fitting-- good arch support      Status post coronary artery stent placement 01/19/2015     LCx stent 1/12015      Allergic rhinitis  01/03/2015    Class 2 obesity in adult 01/02/2015    Accelerated hypertension 01/01/2015    Uterine fibroid 09/09/2013           Left Arm BP - Sitting: (P) 150/104        LAST HbA1c  Lab Results   Component Value Date    HGBA1C 6.1 (H) 02/28/2020       Lipid panel  Lab Results   Component Value Date    CHOL 160 02/28/2020    CHOL 156 10/25/2019    CHOL 148 03/17/2018     Lab Results   Component Value Date    HDL 48 02/28/2020    HDL 37 (L) 10/25/2019    HDL 41 03/17/2018     Lab Results   Component Value Date    LDLCALC 99.0 02/28/2020    LDLCALC 109.2 10/25/2019    LDLCALC 95.0 03/17/2018     Lab Results   Component Value Date    TRIG 65 02/28/2020    TRIG 49 10/25/2019    TRIG 60 03/17/2018     Lab Results   Component Value Date    CHOLHDL 30.0 02/28/2020    CHOLHDL 23.7 10/25/2019    CHOLHDL 27.7 03/17/2018            Review of Systems   Constitution: Positive for chills. Negative for fever.   HENT: Negative for hearing loss and nosebleeds.    Eyes: Negative for blurred vision.   Cardiovascular: Positive for chest pain. Negative for palpitations.   Respiratory: Positive for cough. Negative for hemoptysis and shortness of breath.    Hematologic/Lymphatic: Negative for bleeding problem.   Skin: Negative for itching.   Musculoskeletal: Negative for falls.   Gastrointestinal: Negative for abdominal pain and hematochezia.   Genitourinary: Negative for hematuria.   Neurological: Negative for dizziness and loss of balance.   Psychiatric/Behavioral: Negative for altered mental status and depression.       Objective:   Physical Exam   Constitutional: She is oriented to person, place, and time. She appears well-developed and well-nourished.   HENT:   Head: Normocephalic and atraumatic.   Eyes: Conjunctivae are normal.   Neck: Neck supple. Carotid bruit is not present.   Cardiovascular: Normal rate, regular rhythm and normal heart sounds. Exam reveals no gallop and no friction rub.   No murmur heard.  Pulmonary/Chest:  Effort normal and breath sounds normal. No stridor. No respiratory distress. She has no wheezes.   Neurological: She is alert and oriented to person, place, and time.   Skin: Skin is warm and dry.   Psychiatric: She has a normal mood and affect. Her behavior is normal.       Assessment:     1. Coronary artery disease involving native coronary artery of native heart without angina pectoris    2. CAD (coronary artery disease)    3. Chronic chest pain    4. Essential hypertension    5. History of non-ST elevation myocardial infarction (NSTEMI)    6. Status post coronary artery stent placement    7. Class 2 severe obesity due to excess calories with serious comorbidity and body mass index (BMI) of 36.0 to 36.9 in adult        Plan:   - Recurrent chest pain, atypical in nature. Given her prior hx will procoeed with stress MPI for further evaluation.   - Continue GDMT with ASA and Statin  - Target LDL < 70 , will add zetia next visit.   - WT loss and life style modification  discussed with the patient.   - Hence patient is not feeling well. Instructed to stay at home and update us or Dr. Villegas if not feeling better or develops fever. She took NSAID today.       EKG reviewed no acute ST changes.    Continue with current medical plan and lifestyle changes.  Return sooner for concerns or questions. If symptoms persist go to the ED  I have reviewed all pertinent data including patient's medical history in detail and updated the computerized patient record.     Orders Placed This Encounter   Procedures    NM Myocardial Perfusion Spect Multi Exer     Standing Status:   Future     Standing Expiration Date:   3/10/2021     Order Specific Question:   May the Radiologist modify the order per protocol to meet the clinical needs of the patient?     Answer:   Yes     Order Specific Question:   Will a Cardiologist read this study?     Answer:   No    Nuclear Stress Test     Standing Status:   Future     Standing Expiration Date:    3/10/2021     Order Specific Question:   Which stress agent will be used     Answer:   Exercise    IN OFFICE EKG 12-LEAD (to Muse)     Order Specific Question:   Diagnosis     Answer:   CAD (coronary artery disease) [248434]       Follow up as scheduled.     She expressed verbal understanding and agreed with the plan    Patient's Medications   New Prescriptions    No medications on file   Previous Medications    AMLODIPINE (NORVASC) 10 MG TABLET    Take 1 tablet (10 mg total) by mouth once daily.    ASPIRIN 81 MG CHEW    Take 81 mg by mouth once daily.    ATORVASTATIN (LIPITOR) 80 MG TABLET    Take 1 tablet (80 mg total) by mouth once daily.    CARVEDILOL (COREG) 25 MG TABLET    TAKE 1 TABLET BY MOUTH TWICE DAILY WITH MEAL    CETIRIZINE (ZYRTEC) 10 MG TABLET    Take 1 tablet (10 mg total) by mouth every evening.    CHLORTHALIDONE (HYGROTEN) 25 MG TAB    Take 1 tablet (25 mg total) by mouth once daily.    FLUTICASONE (FLONASE) 50 MCG/ACTUATION NASAL SPRAY    2 sprays (100 mcg total) by Each Nare route once daily.    LOSARTAN (COZAAR) 100 MG TABLET    Take 1 tablet (100 mg total) by mouth once daily.    POTASSIUM CHLORIDE (K-TAB) 20 MEQ    Take 1 tablet (20 mEq total) by mouth once daily.   Modified Medications    No medications on file   Discontinued Medications    No medications on file

## 2020-03-10 NOTE — LETTER
March 10, 2020      [place]  [address] [place] [address] [address]  [place] [address]  Phone: [phone]       Patient: Lizet Palencia   YOB: 1970  Date of Visit: 03/10/2020    To Whom It May Concern:    Rohan Palencia  was at [facility] on 03/10/2020.She may return to work on 3/16/2020 with no restrictions. If you have any questions or concerns, or if I can be of further assistance, please do not hesitate to contact me.    Sincerely,    Lasha Cox MD

## 2020-03-10 NOTE — PATIENT INSTRUCTIONS
Understanding Coronary Artery Disease (CAD)    To understand coronary artery disease (CAD), you need to know how your heart works. Your heart is a muscle that pumps blood throughout your body. To work right, your heart needs a steady supply of oxygen. It gets this oxygen from blood supplied by the coronary arteries.     Healthy artery   Healthy artery. When a coronary artery is healthy and has no blockages, blood flows through easily. Healthy arteries can easily supply the oxygen-rich blood your heart needs.     Damaged artery   Damaged artery. Coronary artery disease begins when damage to the artery lining leads to the buildup of fat-like substances and cholesterol along the artery wall. This is called plaque. This damage could be caused by things like high blood pressure or smoking. This plaque buildup begins to narrow the arteries carrying blood to the heart. This is called atherosclerosis,     Narrowed artery   Narrowed artery. As more plaque builds up, your artery has trouble supplying blood to your heart muscle when it needs it most, such as during exercise. You may not feel any symptoms when this happens. Or you may feel angina--pressure, tightness, achiness, or pain in your chest, jaw, neck, back, or arm.     Blocked artery   Blocked artery. A piece of plaque may break off and completely block the artery. Or a blood clot may plug the narrowed artery. When this happens, blood flow is blocked from reaching the heart. Without oxygen-rich blood, part of the heart muscle becomes damaged and stops working. You may feel crushing pressure or pain in or around your chest. This is a heart attack (acute myocardial infarction, or AMI) and is a medical emergency.     Date Last Reviewed: 3/28/2016  © 0168-1461 [employer]. [address] [place] [address], [place] [address]. All rights reserved. This information is not intended as a substitute for professional medical care. Always follow your healthcare  professional's instructions.          Eating Heart-Healthy Foods  Eating has a big impact on your heart health. In fact, eating healthier can improve several of your heart risks at once. For instance, it helps you manage weight, cholesterol, and blood pressure. Here are ideas to help you make heart-healthy changes without giving up all the foods and flavors you love.  Getting started  · Talk with your health care provider about eating plans, such as the DASH or Mediterranean diet. You may also be referred to a dietitian.  · Change a few things at a time. Give yourself time to get used to a few eating changes before adding more.  · Work to create a tasty, healthy eating plan that you can stick to for the rest of your life.    Goals for healthy eating  Below are some tips to improve your eating habits:  · Limit saturated fats and trans fats. Saturated fats raise your levels of cholesterol, so keep these fats to a minimum. They are found in foods such as fatty meats, whole milk, cheese, and palm and coconut oils. Avoid trans fats because they lower good cholesterol as well as raise bad cholesterol. Trans fats are most often found in processed foods.  · Reduce sodium (salt) intake. Eating too much salt may increase your blood pressure. Limit your sodium intake to 2,300 milligrams (mg) per day, or less if your health care provider recommends it. Dining out less often and eating fewer processed foods are two great ways to decrease the amount of salt you consume.  · Managing calories. A calorie is a unit of energy. Your body burns calories for fuel, but if you eat more calories than your body burns, the extras are stored as fat. Your health care provider can help you create a diet plan to manage your calories. This will likely include eating healthier foods as well as exercising regularly. To help you track your progress, keep a diary to record what you eat and how often you exercise.  Choose the right foods  Aim to make  these foods staples of your diet. If you have diabetes, you may have different recommendations than what is listed here:  · Fruits and vegetable provide plenty of nutrients without a lot of calories. At meals, fill half your plate with these foods. Split the other half of your plate between whole grains and lean protein.  · Whole grains are high in fiber and rich in vitamins and nutrients. Good choices include whole-wheat bread, pasta, and brown rice.  · Lean proteins give you nutrition with less fat. Good choices include fish, skinless chicken, and beans.  · Low-fat or nonfat dairy provides nutrients without a lot of fat. Try low-fat or nonfat milk, cheese, or yogurt.  · Healthy fats can be good for you in small amounts. These are unsaturated fats, such as olive oil, nuts, and fish. Try to have at least 2 servings per week of fatty fish such as salmon, sardines, mackerel, rainbow trout, and albacore tuna. These contain omega-3 fatty acids, which are good for your heart. Flaxseed is another source of a heart-healthy fat.  More on heart healthy eating    Read food labels  Healthy eating starts at the grocery store. Be sure to pay attention to food labels on packaged foods. Look for products that are high in fiber and protein, and low in saturated fat, cholesterol, and sodium. Avoid products that contain trans fat. And pay close attention to serving size. For instance, if you plan to eat two servings, double all the numbers on the label.  Prepare food right  A key part of healthy cooking is cutting down on added fat and salt. Look on the internet for lower-fat, lower-sodium recipes. Also, try these tips:  · Remove fat from meat and skin from poultry before cooking.  · Skim fat from the surface of soups and sauces.  · Broil, boil, bake, steam, grill, and microwave food without added fats.  · Choose ingredients that spice up your food without adding calories, fat, or sodium. Try these items: horseradish, hot sauce,  lemon, mustard, nonfat salad dressings, and vinegar. For salt-free herbs and spices, try basil, cilantro, cinnamon, pepper, and rosemary.  Date Last Reviewed: 6/25/2015 © 2000-2017 [employer]. [address] [place] [address]. All rights reserved. This information is not intended as a substitute for professional medical care. Always follow your healthcare professional's instructions.          Aerobic Exercise for a Healthy Heart  Exercise is a lot more than an energy booster and a stress reliever. It also strengthens your heart muscle, lowers your blood pressure and cholesterol, and burns calories. It can also improve your resting muscle tone, and your mood.     Remember, some activity is better than none.    Choose an aerobic activity  Choose an activity that makes your heart and lungs work harder than they do when you rest or walk normally. This aerobic exercise can improve the way your heart and other muscles use oxygen. Make it fun by exercising with a friend and choosing an activity you enjoy. Here are some ideas:  · Walking  · Swimming  · Bicycling  · Stair climbing  · Dancing  · Jogging  · Gardening  Exercise regularly  If you havent been exercising regularly,  get your doctors OK first. Then start slowly.  Here are some tips:  · Begin exercising 3 times a week for 5 to 10 minutes at a time.  · When you feel comfortable, add a few minutes each session.  · Slowly build up to exercising 3 to 4 times each week. Each session should last for 40 minutes, on average, and involve moderate- to vigorous-intensity physical activity.  · If you have been given nitroglycerin, be sure to carry it when you exercise.  · If you get chest pain (angina) when youre exercising, stop what youre doing, take your nitroglycerin, and call your doctor.  Date Last Reviewed: 6/2/2016  © 4816-9423 [employer]. [address] [place] [address]. All rights reserved. This information is not  intended as a substitute for professional medical care. Always follow your healthcare professional's instructions.

## 2020-03-16 ENCOUNTER — LAB VISIT (OUTPATIENT)
Dept: LAB | Facility: HOSPITAL | Age: 50
End: 2020-03-16
Attending: FAMILY MEDICINE
Payer: OTHER GOVERNMENT

## 2020-03-16 DIAGNOSIS — Z11.3 SCREEN FOR STD (SEXUALLY TRANSMITTED DISEASE): ICD-10-CM

## 2020-03-16 LAB
C TRACH DNA SPEC QL NAA+PROBE: NOT DETECTED
N GONORRHOEA DNA SPEC QL NAA+PROBE: NOT DETECTED

## 2020-03-16 PROCEDURE — 87491 CHLMYD TRACH DNA AMP PROBE: CPT

## 2020-03-23 ENCOUNTER — TELEPHONE (OUTPATIENT)
Dept: FAMILY MEDICINE | Facility: CLINIC | Age: 50
End: 2020-03-23

## 2020-03-23 DIAGNOSIS — J18.9 COMMUNITY ACQUIRED PNEUMONIA, UNSPECIFIED LATERALITY: Primary | ICD-10-CM

## 2020-03-23 NOTE — TELEPHONE ENCOUNTER
I returned patient's call in regards to being seen in the office. I asked patient what she needed to be seen. At this point we are not seeing patients right now. The option will be a virtual call. She rather have a phone call. I will send a message to Dr. Louis.

## 2020-03-23 NOTE — TELEPHONE ENCOUNTER
----- Message from Edie Bardales sent at 3/23/2020  1:40 PM CDT -----  Contact: [phone]/self  Patient is requesting a call back regarding being seen in person. Please call

## 2020-03-23 NOTE — TELEPHONE ENCOUNTER
Patient wants to schedule an in person appt with you for Hospital f/u for Pneumonia. Do I proceed in scheduling with you?

## 2020-03-23 NOTE — TELEPHONE ENCOUNTER
----- Message from Jeanie Julio sent at 3/23/2020  8:02 AM CDT -----  Contact: Patient   Type:  Sooner Apoointment Request    Caller is requesting a sooner appointment.  Caller declined first available appointment listed below.  Caller will not accept being placed on the waitlist and is requesting a message be sent to doctor.  Name of Caller: Lizet   When is the first available appointment? 4/29/20  Symptoms: follow up from urgent care for Pneumonia  Would the patient rather a call back or a response via [facility]?  Call back   Best Call Back Number: [phone]  Additional Information:  no

## 2020-03-24 NOTE — TELEPHONE ENCOUNTER
My extensive telephone call with patient is documented below.  She is expecting a call to schedule a follow-up chest x-ray for in 6 weeks to ensure resolution of infiltrate noted on chest x-ray performed in urgent care on 03/17/2020.      She is also going to notify us if she still has a fever at the urgent care today where she is being seen and has any problems being scheduled for a corona virus test if it is indicated.  I told her that if she has a fever still today at urgent care she should be tested for covid-19.  She will let us know if she needs drive through test orders

## 2020-03-24 NOTE — TELEPHONE ENCOUNTER
"Called patient over the phone to discuss her situation:    Seen in  Tuesday March 17 for PNA- diagnosed on CXR-- fever and cough. /75 Temp 100.8.     Reports findings of pneumonia--  "Hazy Infiltrate is suspected in the peripheri of the RUL." per read report.     Steroid pack, Levofloxacin and Augmentin prescribed-- has completed steroid pack and levofloxacin, except has a few more days of the Augmentin. Prescribed Tussin AC syr/ Benzonatate for cough.     Patient reports that she feels improved-has nearly back to normal appetite with good p.o. intake, does not feel feverish, does not have malaise/known fever.  She is not short of breath.  She reports that she has a follow-up appointment with the urgent care today at 3:00.    She reports that she was informed that she should have a follow-up chest x-ray to ensure resolution of the infiltrate, and I notified her that my staff would put in an order for her to have a repeat one in about 6 weeks due to lag time between improvement on the film and clinical improvement.    She was notified that if she does unexpectedly have a fever today she should be tested for Covid-19 as she does still have a lingering cough.    She will notify if this is the case and she has any trouble obtaining a corona virus test.  "

## 2020-05-08 ENCOUNTER — HOSPITAL ENCOUNTER (OUTPATIENT)
Dept: RADIOLOGY | Facility: HOSPITAL | Age: 50
Discharge: HOME | End: 2020-05-08
Attending: FAMILY MEDICINE
Payer: OTHER GOVERNMENT

## 2020-05-08 DIAGNOSIS — J18.9 COMMUNITY ACQUIRED PNEUMONIA, UNSPECIFIED LATERALITY: ICD-10-CM

## 2020-05-08 PROCEDURE — 71046 X-RAY EXAM CHEST 2 VIEWS: CPT | Mod: 26,,, | Performed by: RADIOLOGY

## 2020-05-08 PROCEDURE — 71046 XR CHEST PA AND LATERAL: ICD-10-PCS | Mod: 26,,, | Performed by: RADIOLOGY

## 2020-05-08 PROCEDURE — 71046 X-RAY EXAM CHEST 2 VIEWS: CPT | Mod: TC,FY

## 2020-07-27 ENCOUNTER — LAB VISIT (OUTPATIENT)
Dept: LAB | Facility: HOSPITAL | Age: 50
End: 2020-07-27
Attending: FAMILY MEDICINE
Payer: OTHER GOVERNMENT

## 2020-07-27 DIAGNOSIS — E87.6 DIURETIC-INDUCED HYPOKALEMIA: ICD-10-CM

## 2020-07-27 DIAGNOSIS — E55.9 VITAMIN D DEFICIENCY: ICD-10-CM

## 2020-07-27 DIAGNOSIS — I10 ESSENTIAL HYPERTENSION: ICD-10-CM

## 2020-07-27 DIAGNOSIS — R73.03 PREDIABETES: ICD-10-CM

## 2020-07-27 DIAGNOSIS — T50.2X5A DIURETIC-INDUCED HYPOKALEMIA: ICD-10-CM

## 2020-07-27 LAB
25(OH)D3+25(OH)D2 SERPL-MCNC: 23 NG/ML (ref 30–96)
ALBUMIN SERPL BCP-MCNC: 3.5 G/DL (ref 3.5–5.2)
ALP SERPL-CCNC: 49 U/L (ref 55–135)
ALT SERPL W/O P-5'-P-CCNC: 13 U/L (ref 10–44)
ANION GAP SERPL CALC-SCNC: 5 MMOL/L (ref 8–16)
AST SERPL-CCNC: 19 U/L (ref 10–40)
BILIRUB SERPL-MCNC: 0.4 MG/DL (ref 0.1–1)
BUN SERPL-MCNC: 17 MG/DL (ref 6–20)
CALCIUM SERPL-MCNC: 9.3 MG/DL (ref 8.7–10.5)
CHLORIDE SERPL-SCNC: 101 MMOL/L (ref 95–110)
CHOLEST SERPL-MCNC: 162 MG/DL (ref 120–199)
CHOLEST/HDLC SERPL: 4.1 {RATIO} (ref 2–5)
CO2 SERPL-SCNC: 30 MMOL/L (ref 23–29)
CREAT SERPL-MCNC: 1 MG/DL (ref 0.5–1.4)
EST. GFR  (AFRICAN AMERICAN): >60 ML/MIN/1.73 M^2
EST. GFR  (NON AFRICAN AMERICAN): >60 ML/MIN/1.73 M^2
ESTIMATED AVG GLUCOSE: 123 MG/DL (ref 68–131)
GLUCOSE SERPL-MCNC: 132 MG/DL (ref 70–110)
HBA1C MFR BLD HPLC: 5.9 % (ref 4–5.6)
HDLC SERPL-MCNC: 40 MG/DL (ref 40–75)
HDLC SERPL: 24.7 % (ref 20–50)
LDLC SERPL CALC-MCNC: 109 MG/DL (ref 63–159)
NONHDLC SERPL-MCNC: 122 MG/DL
POTASSIUM SERPL-SCNC: 3.3 MMOL/L (ref 3.5–5.1)
PROT SERPL-MCNC: 7.1 G/DL (ref 6–8.4)
SODIUM SERPL-SCNC: 136 MMOL/L (ref 136–145)
TRIGL SERPL-MCNC: 65 MG/DL (ref 30–150)

## 2020-07-27 PROCEDURE — 83036 HEMOGLOBIN GLYCOSYLATED A1C: CPT

## 2020-07-27 PROCEDURE — 80061 LIPID PANEL: CPT

## 2020-07-27 PROCEDURE — 36415 COLL VENOUS BLD VENIPUNCTURE: CPT | Mod: PO

## 2020-07-27 PROCEDURE — 82306 VITAMIN D 25 HYDROXY: CPT

## 2020-07-27 PROCEDURE — 80053 COMPREHEN METABOLIC PANEL: CPT

## 2020-08-03 ENCOUNTER — OFFICE VISIT (OUTPATIENT)
Dept: FAMILY MEDICINE | Facility: CLINIC | Age: 50
End: 2020-08-03
Payer: OTHER GOVERNMENT

## 2020-08-03 VITALS
HEART RATE: 72 BPM | DIASTOLIC BLOOD PRESSURE: 80 MMHG | WEIGHT: 233.25 LBS | SYSTOLIC BLOOD PRESSURE: 124 MMHG | HEIGHT: 68 IN | BODY MASS INDEX: 35.35 KG/M2 | OXYGEN SATURATION: 98 %

## 2020-08-03 DIAGNOSIS — E66.01 CLASS 2 SEVERE OBESITY DUE TO EXCESS CALORIES WITH SERIOUS COMORBIDITY AND BODY MASS INDEX (BMI) OF 35.0 TO 35.9 IN ADULT: ICD-10-CM

## 2020-08-03 DIAGNOSIS — T50.2X5A DIURETIC-INDUCED HYPOKALEMIA: ICD-10-CM

## 2020-08-03 DIAGNOSIS — Z12.31 ENCOUNTER FOR SCREENING MAMMOGRAM FOR BREAST CANCER: ICD-10-CM

## 2020-08-03 DIAGNOSIS — E87.6 DIURETIC-INDUCED HYPOKALEMIA: ICD-10-CM

## 2020-08-03 DIAGNOSIS — I25.2 HISTORY OF NON-ST ELEVATION MYOCARDIAL INFARCTION (NSTEMI): ICD-10-CM

## 2020-08-03 DIAGNOSIS — R29.898 RIGHT HAND WEAKNESS: ICD-10-CM

## 2020-08-03 DIAGNOSIS — Z79.899 MEDICATION MANAGEMENT: ICD-10-CM

## 2020-08-03 DIAGNOSIS — Z01.84 ENCOUNTER FOR ANTIBODY RESPONSE EXAMINATION: ICD-10-CM

## 2020-08-03 DIAGNOSIS — I25.10 CORONARY ARTERY DISEASE INVOLVING NATIVE CORONARY ARTERY OF NATIVE HEART WITHOUT ANGINA PECTORIS: Primary | ICD-10-CM

## 2020-08-03 DIAGNOSIS — E55.9 VITAMIN D DEFICIENCY: ICD-10-CM

## 2020-08-03 DIAGNOSIS — I10 ESSENTIAL HYPERTENSION: ICD-10-CM

## 2020-08-03 DIAGNOSIS — R73.03 PREDIABETES: ICD-10-CM

## 2020-08-03 PROCEDURE — 99999 PR PBB SHADOW E&M-EST. PATIENT-LVL V: CPT | Mod: PBBFAC,,, | Performed by: FAMILY MEDICINE

## 2020-08-03 PROCEDURE — 99214 PR OFFICE/OUTPT VISIT, EST, LEVL IV, 30-39 MIN: ICD-10-PCS | Mod: S$PBB,,, | Performed by: FAMILY MEDICINE

## 2020-08-03 PROCEDURE — 99999 PR PBB SHADOW E&M-EST. PATIENT-LVL V: ICD-10-PCS | Mod: PBBFAC,,, | Performed by: FAMILY MEDICINE

## 2020-08-03 PROCEDURE — 99215 OFFICE O/P EST HI 40 MIN: CPT | Mod: PBBFAC,PO | Performed by: FAMILY MEDICINE

## 2020-08-03 PROCEDURE — 99214 OFFICE O/P EST MOD 30 MIN: CPT | Mod: S$PBB,,, | Performed by: FAMILY MEDICINE

## 2020-08-03 RX ORDER — TRIAMCINOLONE ACETONIDE 1 MG/G
OINTMENT TOPICAL 2 TIMES DAILY PRN
Qty: 80 G | Refills: 2 | Status: SHIPPED | OUTPATIENT
Start: 2020-08-03 | End: 2020-08-17

## 2020-08-03 NOTE — PROGRESS NOTES
Office Visit    Patient Name: Lizet Palencia    : 1970  MRN: [id number]    Subjective:  Lizet is a 50 y.o. female who presents today for:    Follow-up    49 YO patient of mine most recently seen by me 20 for annual wellness exam, here for 6 month follow up of chronic conditions that include CAD w/ h/o stent/NSTEMI, HTN, prediabetes(A1c 5.9 20), HLD, Obesity.     She saw cardiologist Dr. Cox 03/10/2020 who advised six-month follow-up, considering addition of Zetia if lipid panel not with LDL to goal.  Lipid panel drawn 2020 shows LDL of 109 on Lipitor 80- she is taking her Lipitor generally every other day.  Her EKG done in cardiology office 03/10/2020 was unremarkable-she did order a follow-up nuclear stress test that she did not have.  Her exercise tolerance has been stable and she is walking regularly without any cardiopulmonary symptoms.    Her weight is down a couple of lb in the last 3-6 months. Diet: low carb and minimal fried foods , exercise: in general fairly regular walking (2 miles at least 4 days per week)     CXR 20: unremarkable, she was previously treated for community-acquired pneumonia, unclear if she was tested for COVID at that time as she went to an outside urgent care, fortunately she has fully recovered    Past Medical History  Past Medical History:   Diagnosis Date    Accelerated hypertension 2015    Allergic rhinitis 1/3/2015    Coronary artery disease 2016    Coronary artery disease involving native coronary artery of native heart without angina pectoris 2016    Hepatitis C antibody test positive 2016    Negative HCV RNA 2016, will check again in 3 months but no detectable HCV virus HCV AB will likely remain positive for life    History of non-ST elevation myocardial infarction (NSTEMI) 2016    Menorrhagia     Obesity (BMI 30-39.9) 2015    Prediabetes 3/14/2018    Status post coronary artery stent placement 2015     LCx stent         Past Surgical History  Past Surgical History:   Procedure Laterality Date     SECTION, CLASSIC      x3    CORONARY STENT PLACEMENT      D&C Hysteroscopy      right arm surgery  2009    TUBAL LIGATION         Family History  Family History   Problem Relation Age of Onset    Hypertension Mother     Diabetes Maternal Aunt     Hypertension Maternal Aunt     Cancer Neg Hx     Heart disease Neg Hx        Social History  Social History     Socioeconomic History    Marital status:      Spouse name: Not on file    Number of children: Not on file    Years of education: Not on file    Highest education level: Not on file   Occupational History    Not on file   Social Needs    Financial resource strain: Not on file    Food insecurity     Worry: Not on file     Inability: Not on file    Transportation needs     Medical: Not on file     Non-medical: Not on file   Tobacco Use    Smoking status: Never Smoker    Smokeless tobacco: Never Used   Substance and Sexual Activity    Alcohol use: Yes     Comment: social    Drug use: No    Sexual activity: Yes     Partners: Male     Birth control/protection: Surgical   Lifestyle    Physical activity     Days per week: Not on file     Minutes per session: Not on file    Stress: Not on file   Relationships    Social connections     Talks on phone: Not on file     Gets together: Not on file     Attends [employer] service: Not on file     Active member of club or organization: Not on file     Attends meetings of clubs or organizations: Not on file     Relationship status: Not on file   Other Topics Concern    Not on file   Social History Narrative    Not on file       Current Medications  Medications reviewed and updated.     Allergies   Review of patient's allergies indicates:  No Known Allergies    Review of Systems (Pertinent positives)  Review of Systems   Constitutional: Negative for unexpected weight change.   Eyes:  "Negative for visual disturbance.   Respiratory: Negative for chest tightness and shortness of breath.    Cardiovascular: Negative for chest pain, palpitations and leg swelling.   Neurological: Positive for weakness (Of her right forearm and hand, chronic ever since a prior injury with nerve damage during surgery). Negative for dizziness, light-headedness and headaches.       /80 (BP Location: Right arm, Patient Position: Sitting)   Pulse 72   Ht 5' 8" (1.727 m)   Wt 105.8 kg (233 lb 4 oz)   SpO2 98%   BMI 35.47 kg/m²     Physical Exam  Vitals signs reviewed.   Constitutional:       General: She is not in acute distress.     Appearance: She is well-developed. She is obese.   HENT:      Head: Normocephalic and atraumatic.   Eyes:      Conjunctiva/sclera: Conjunctivae normal.   Cardiovascular:      Rate and Rhythm: Normal rate and regular rhythm.   Pulmonary:      Effort: Pulmonary effort is normal.      Breath sounds: Normal breath sounds.   Skin:     General: Skin is warm and dry.          Neurological:      Mental Status: She is alert and oriented to person, place, and time.   Psychiatric:         Mood and Affect: Mood normal.         Behavior: Behavior normal.           Assessment/Plan:  Lizet Palencia is a 50 y.o. female who presents today for :    Lizet was seen today for follow-up.    Diagnoses and all orders for this visit:    Coronary artery disease involving native coronary artery of native heart without angina pectoris  Comments:  due for Cardiology follow up with Dr Cox in September 2020. on medical mgmt-- stressed consistency of medications, exercise tolerance stable  Orders:  -     Hemoglobin A1C; Future  -     Comprehensive metabolic panel; Future  -     Lipid Panel; Future  -     CBC auto differential; Future  -     TSH; Future  -     Vitamin D; Future    History of non-ST elevation myocardial infarction (NSTEMI)    Essential hypertension  Comments:  Controlled, continue Norvasc " 10/Coreg 25 b.i.d./Hygroten 25/Cozaar 100.  Discussed need to take her potassium supplement daily which she can do, recheck 6 mo  Orders:  -     Hemoglobin A1C; Future  -     Comprehensive metabolic panel; Future  -     Lipid Panel; Future  -     CBC auto differential; Future  -     TSH; Future    Diuretic-induced hypokalemia  Comments:  needs to improve consistency-- take every day and needs to improve consistency    Class 2 severe obesity due to excess calories with serious comorbidity and body mass index (BMI) of 35.0 to 35.9 in adult  Comments:  Weight trending down, discussed the importance of ongoing attention to healthy diet, exercise    Prediabetes  -     Hemoglobin A1C; Future  -     Comprehensive metabolic panel; Future  -     Lipid Panel; Future  -     CBC auto differential; Future    Vitamin D deficiency  Comments:  on an OTC supplement-- needs to krunal consistently every day  Orders:  -     Vitamin D; Future    Medication management  -     Hemoglobin A1C; Future  -     Comprehensive metabolic panel; Future  -     Lipid Panel; Future  -     CBC auto differential; Future  -     TSH; Future  -     Vitamin D; Future    Encounter for antibody response examination  -     COVID-19 (SARS CoV-2) IgG Antibody; Future    Encounter for screening mammogram for breast cancer  -     Mammo Digital Screening Bilat; Future    Right hand weakness  Comments:  Chronic, ongoing since previous injury with surgery that resulted in nerve damage, would like to explore possibility of OT for her hand  Orders:  -     Ambulatory referral/consult to Physical/Occupational Therapy; Future    Other orders  -     triamcinolone acetonide 0.1% (KENALOG) 0.1 % ointment; Apply topically 2 (two) times daily as needed.            ICD-10-CM ICD-9-CM    1. Coronary artery disease involving native coronary artery of native heart without angina pectoris  I25.10 414.01 Hemoglobin A1C      Comprehensive metabolic panel      Lipid Panel      CBC auto  differential      TSH      Vitamin D    due for Cardiology follow up with Dr Cox in September 2020. on medical mgmt-- stressed consistency of medications, exercise tolerance stable   2. History of non-ST elevation myocardial infarction (NSTEMI)  I25.2 412    3. Essential hypertension  I10 401.9 Hemoglobin A1C      Comprehensive metabolic panel      Lipid Panel      CBC auto differential      TSH    Controlled, continue Norvasc 10/Coreg 25 b.i.d./Hygroten 25/Cozaar 100.  Discussed need to take her potassium supplement daily which she can do, recheck 6 mo   4. Diuretic-induced hypokalemia  E87.6 276.8     T50.2X5A E944.4     needs to improve consistency-- take every day and needs to improve consistency   5. Class 2 severe obesity due to excess calories with serious comorbidity and body mass index (BMI) of 35.0 to 35.9 in adult  E66.01 278.01     Z68.35 V85.35     Weight trending down, discussed the importance of ongoing attention to healthy diet, exercise   6. Prediabetes  R73.03 790.29 Hemoglobin A1C      Comprehensive metabolic panel      Lipid Panel      CBC auto differential   7. Vitamin D deficiency  E55.9 268.9 Vitamin D    on an OTC supplement-- needs to krunal consistently every day   8. Medication management  Z79.899 V58.69 Hemoglobin A1C      Comprehensive metabolic panel      Lipid Panel      CBC auto differential      TSH      Vitamin D   9. Encounter for antibody response examination  Z01.84 V72.61 COVID-19 (SARS CoV-2) IgG Antibody   10. Encounter for screening mammogram for breast cancer  Z12.31 V76.12 Mammo Digital Screening Bilat   11. Right hand weakness  R29.898 728.87 Ambulatory referral/consult to Physical/Occupational Therapy    Chronic, ongoing since previous injury with surgery that resulted in nerve damage, would like to explore possibility of OT for her hand       There are no Patient Instructions on file for this visit.      Follow up in about 6 months (around 2/3/2021) for to follow up on  lab results, return as needed for new concerns.

## 2020-08-14 ENCOUNTER — CLINICAL SUPPORT (OUTPATIENT)
Dept: REHABILITATION | Facility: HOSPITAL | Age: 50
End: 2020-08-14
Attending: FAMILY MEDICINE
Payer: OTHER GOVERNMENT

## 2020-08-14 DIAGNOSIS — R29.898 RIGHT HAND WEAKNESS: ICD-10-CM

## 2020-08-14 PROCEDURE — 97165 OT EVAL LOW COMPLEX 30 MIN: CPT | Mod: PO

## 2020-08-14 PROCEDURE — 97110 THERAPEUTIC EXERCISES: CPT | Mod: PO

## 2020-08-14 NOTE — PLAN OF CARE
[facility] Occupational Therapy  Hand and Wrist  Evaluation     Date: 2020  Name: Lizet Palencia  Clinic Number: [id number]    Therapy Diagnosis:   Encounter Diagnosis   Name Primary?    Right hand weakness      Physician: Marta Louis MD    Physician Orders: [id number] (ICD-10-CM) - Right hand weakness and treat ; 2x/wk x 6 wks , Modalities prn  Medical Diagnosis:   Evaluation Date: 2020  Insurance Authorization Expiration date : 8/3/2021  Plan of Care Certification Period: 2020  Date of Return to MD: no set date     Visit # / Visits authorized:   Time In: 10:45 am   Time Out: 11:15 am  Total Billable Time: 30 minutes    Precautions:  Standard and Fall    Subjective       Involved Side: Right wrist   Dominant Side: Right  Date of Onset:  , over 11 years ago   History of Current Condition/Mechanism of Injury: Chronic, ongoing since previous injury with surgery that resulted in nerve damage, would like to explore possibility of OT for her hand; surgery was in   Imaging: Please see image report   Surgical Procedure and Date: , over 11 years ago     Past Medical History/Physical Systems Review:   Lizet Palencia  has a past medical history of Accelerated hypertension, Allergic rhinitis, Coronary artery disease, Coronary artery disease involving native coronary artery of native heart without angina pectoris, Hepatitis C antibody test positive, History of non-ST elevation myocardial infarction (NSTEMI), Menorrhagia, Obesity (BMI 30-39.9), Prediabetes, and Status post coronary artery stent placement.    Lizet Palencia  has a past surgical history that includes  section, classic; right arm surgery (); D&C Hysteroscopy; Coronary stent placement; and Tubal ligation.    Lizet has a current medication list which includes the following prescription(s): amlodipine, aspirin, atorvastatin, carvedilol, cetirizine, chlorthalidone,  fluticasone propionate, losartan, potassium chloride, and triamcinolone acetonide 0.1%.    Review of patient's allergies indicates:  No Known Allergies         Pain:  Functional Pain Scale Rating 0-10:   5/10 on current  4/10 at best  5/10 at worst  Location:Right forearm ; alleviated pain with hand exercises     Patient's Goals for Therapy:  to increase motion, reduce pain, return to normal function of hand     Occupation: school administration , retired and now is in school     Duties:Typing     Functional Limitations/Social History:    Previous functional status includes: Independent with all ADLs.     Current FunctionalStatus   Home/Living environment : lives with their family      Limitation of Functional Status as follows:   ADLs/IADLs:     - Patient is self sufficient and full motion   Objective       Observation:   Skin intact, old scar noted      Sensation: 4.31 on scar ; the small finger PIP and P3 4.56 ; loss of protection touch         Wrist ROM: Right ; pt has full motion , however, has small finger flexion that appears to be ulnar nerve injury  Active       Strength: (HOOD Dynamometer in lbs.) Average 3 trials, Position II:     8/14/2020 8/14/2020   Rung2 Right  Left   HOOD # 2 40# 46#       Key pinch : right - 6 psi  ;left -10 psi    Patient presents with atrophy in hand   Treatment     Treatment Time In/out  (separate from total time) : 10 minutes at the end of the hour     Home Exercise Program/Education:  Issued HEP (see patient instructions in EMR) and educated on modality use for pain management . Exercises were reviewed and Lizet was able to demonstrate them prior to the end of the session.   Pt received a written copy of exercises to perform at home. Lizet demonstrated poor understanding of the education provided.  Pt was advised to perform these exercises free of pain, and to stop performing them if pain occurs.    Patient/Family Education: role of OT, goals for OT, double booking ,  scheduling/cancellations - pt verbalized understanding. Discussed insurance limitations with patient.    Additional Education provided: role of CHT/OT, goals for CHT/OT, discussed insurance limitation with patient- patient verbalized understanding         Assessment     This 50 y.o. female referred to Outpatient Occupational Therapy with diagnosis of   Encounter Diagnosis   Name Primary?    Right hand weakness     presents with limitations as described in problem list. Patient can benefit from Occupational Therapy services for Iontophoresis, Ultrasound, moist heat, PROM, AAROM, AROM, Theraputic exercises, joint mobs, home exercise program provied with written instructions, ice and strengthening and Orthosis, if deemed necessary . The following goals were discussed with the patient and she is in agreement with them as to be addressed in the treatment plan.     Problem List:   Decreased function of Right UE, Decreased ROM, Increased pain, Inability to perform work/tasks, Inability to perform leisure activiites and Inability to perform self care tasks      Anticipated barriers to occupational therapy: 11 years of numbness   Pt has no cultural, educational or language barriers to learning provided.        Profile and History Assessment of Occupational Performance Level of Clinical Decision Making Complexity Score   Occupational Profile:   Lizet Palencia is a 50 y.o. female who lives with their family and is currently employed Lizet Palencia has difficulty with  ADLs and IADLs as listed previously, which  affecting his/her daily functional abilities.      Comorbidities:    has a past medical history of Accelerated hypertension, Allergic rhinitis, Coronary artery disease, Coronary artery disease involving native coronary artery of native heart without angina pectoris, Hepatitis C antibody test positive, History of non-ST elevation myocardial infarction (NSTEMI), Menorrhagia, Obesity (BMI 30-39.9),  Prediabetes, and Status post coronary artery stent placement.    Medical and Therapy History Review:   Expanded               Performance Deficits    Physical:  Joint Mobility  Joint Stability   Strength  Pinch Strength  Gross Motor Coordination  Fine Motor Coordination  Postural Control  Pain    Cognitive:  No Deficits    Psychosocial:    No Deficits     Clinical Decision Making:  low    Assessment Process:  Problem-Focused Assessments    Modification/Need for Assistance:  Not Necessary    Intervention Selection:  Limited Treatment Options       low  Based on PMHX, co morbidities , data from assessments and functional level of assistance required with task and clinical presentation directly impacting function.         The following goals were discussed with the patient and patient is in agreement with them as to be addressed in the treatment plan.     Goals:       Goals to be met in  4  weeks:    1) Patient to be IND with HEP and modalities for pain managment.  2) Patient will  Increase Active Range of motion 15-20 degrees in hand/wrist to increase functional hand use for ADLs/work/leisure activities.  3)Pt will increase  strength 10-20 lbs. to improve functional grasp for ADLs/work/leisure activities.   4) Pt will increase key pinch to assist with turning key and all pinching activities.         Plan     Plan    Certification Period/Plan of care expiration: 8/14/2020 to 9/14/2020.    Outpatient Occupational Therapy 1 times weekly for 0 weeks to include the following interventions: Paraffin, Fluidotherapy, Manual therapy/joint mobilizations, Modalities for pain management, US 3 mhz, Therapeutic exercises/activities., Iontophoresis with 2.0 cc Dexamethasone, Strengthening, Orthotic Fabrication/Fit/Training and Energy Conservation.      Maki Méndez, MOT,  OTR/L, CHT  Occupational therapist, Certified Hand Therapist

## 2020-08-14 NOTE — PATIENT INSTRUCTIONS
Keeping right fingertips straight, press putty toward base of palm.  Repeat  2  minutes . Do __2__ sessions per day.  Activity: Squeeze flour sifter, plastic squeeze bottles, turkey baster, juice from fruit.* every other day           Squeeze putty using thumb and all fingers.  Repeat  for 2 minutes . Do __2__ sessions per day. Every other day                 Keeping knuckles straight, bend fingertips to squeeze putty.  Repeat  for 2 minutes .  Do __2__ sessions per day.            Squeeze between thumb and side of index  finger in turn.  Repeat  for 2 minutes .  Do __2_ sessions per day. Every other day       2. Putty Pinch:    Roll up the putty to create a small tubular section. Next, pinch the putty and repeat down the section with just your index finger,  middle finger, and your thumb.  Repeat . Try to avoid hyperextension of the thumb.   Repeat each direction  15 pinches,  2 times a day. Every other day         All exercises are done every other day; do not exceed this. About 2-3 times  A week.         Tendon Glides and Joint Blocking        Start at position A and move through each position slowly attempting to achieve full glide.  A-E is ONE repetition.     Complete 10 reps at 6-8 times per day.         Thumb extension  Finger Extension / Thumb Abduction: Resisted        With rubber band around right thumb and _all_ fingers, hand slightly cupped, gently spread thumb and fingers apart.  Repeat __15__ times per set. Do __3__ sets per session.

## 2020-08-18 ENCOUNTER — CLINICAL SUPPORT (OUTPATIENT)
Dept: REHABILITATION | Facility: HOSPITAL | Age: 50
End: 2020-08-18
Attending: FAMILY MEDICINE
Payer: OTHER GOVERNMENT

## 2020-08-18 DIAGNOSIS — R53.1 WEAKNESS: ICD-10-CM

## 2020-08-18 DIAGNOSIS — R29.898 RIGHT HAND WEAKNESS: Primary | ICD-10-CM

## 2020-08-18 PROCEDURE — 97110 THERAPEUTIC EXERCISES: CPT | Mod: PO

## 2020-08-18 NOTE — PROGRESS NOTES
Occupational Therapy Daily Treatment Note       Date: 8/18/2020  Name: Lizet Palencia  [facility] Number: [id number]    Therapy Diagnosis:   Encounter Diagnoses   Name Primary?    Right hand weakness Yes    Weakness      Physician: Marta Louis MD    Physician Orders: [id number] (ICD-10-CM) - Right hand weakness and treat ; 2x/wk x 6 wks , Modalities prn  Medical Diagnosis:   Evaluation Date: 8/14/2020  Insurance Authorization Expiration date : 8/3/2021  Plan of Care Certification Period: 9/14/2020  Date of Return to MD: no set date      Visit # / Visits authorized: 1 / 1  Time In: 10:45 am   Time Out: 11:15 am  Total Billable Time: 30 minutes           Subjective     Pt reports: Started HEP    she was compliant with home exercise program given last session.   Response to previous treatment: started HEP and does not feel sore       Pain: 2/10 after typing   Location: right wrists      Objective    received the following supervised modalities after being cleared for contradictions for 10 minutes:     -  Lizet received moist  Heat  to  Right wrist/ hand(s) for 10 minutes to increase blood flow, circulation, pain management and for tissue elasticity prior to therex.         Lizet   received the following direct contact modalities after being cleared for contraindications for  5 minutes:    -Patient receives ultrasound  for pain control and remold scar tissue to increase tissue elasticity @ 20 duty cycle, 3.3 Mhz, applied to 5, intensity = 0.6 w/cm2 for 8 minutes.           Lizet   received the following manual therapy techniques to increase joint mobilization and soft tissue mobilization for 16 minutes:          myofascial cupping X  8  minutes for loosening soft tissue,  improve scar mobility, release  tissue restrictions, decrease muscle tension  and pain, improve blood flow and increase function of restricted tissues    CHT performed  Instrument Assisted Soft Tissue  Mobilization  stimulating tissue turnover, scar tissue resorption, and the regeneration of tendons, muscles and other soft tissue structures x 8  minutes          Lizet  participated in dynamic functional therapeutic exercises to improve functional performance while increasing strength, endurance, ROM,  and flexibility  for 15  minutes, including:    - 30 gm finger weights for Rf and SF  Extension x 30 reps  - yellow dig extend x 2 minutes  - red CP PIP flex with DIP extended  - lumbrical strengthening with yellow putty and #5 puttyciser x 3 minutes        Home Exercises and Education Provided     Education provided:  - discussed insurance limitation  - Progress towards goals  - Pt also instructed on importance of attention to postural alignment during rest and activity to decrease compensatory movement patterns.       Written Home Exercises Provided: Patient instructed to cont prior HEP.  Exercises were reviewed and Lizet was able to demonstrate them prior to the end of the session.  Lizet demonstrated good  understanding of the HEP provided.   .   See EMR under Patient Instructions for exercises provided prior visit.       Assessment     Pt would continue to benefit from skilled OT. Pt tolerated session well.      Lizet is progressing well towards her goals and there are no updates to goals at this time. Pt prognosis is Good.       The patient demonstrated proper understanding  of each exercise.Pt required verbal and tactile cues for all new techniques used .  Pt continues to be limited in functional and leisurely pursuits. Pain limits patients participation in ADL's. Pt is not able to carryout necessary vocational tasks. Patient continues to requires cues and skilled supervision to complete HEP       Anticipated barriers to occupational therapy: N/A     Pt's spiritual, cultural and educational needs considered and pt agreeable to plan of care and goals.    Goals to be met in  4  weeks:     1) Patient to be  IND with HEP and modalities for pain managment.  2) Patient will  Increase Active Range of motion 15-20 degrees in hand/wrist to increase functional hand use for ADLs/work/leisure activities.  3)Pt will increase  strength 10-20 lbs. to improve functional grasp for ADLs/work/leisure activities.   4) Pt will increase key pinch to assist with turning key and all pinching activities.            Plan      Plan     Certification Period/Plan of care expiration: 8/14/2020 to 9/14/2020.     Outpatient Occupational Therapy 1 times weekly for 0 weeks to include the following interventions: Paraffin, Fluidotherapy, Manual therapy/joint mobilizations, Modalities for pain management, US 3 mhz, Therapeutic exercises/activities., Iontophoresis with 2.0 cc Dexamethasone, Strengthening, Orthotic Fabrication/Fit/Training and Energy Conservation.        ARCHANA Baldwin,  OTR/L, CHT  Occupational therapist, Certified Hand Therapist        ARCHANA Baldwin, OTR/L, CHT

## 2020-08-25 ENCOUNTER — CLINICAL SUPPORT (OUTPATIENT)
Dept: REHABILITATION | Facility: HOSPITAL | Age: 50
End: 2020-08-25
Attending: FAMILY MEDICINE
Payer: OTHER GOVERNMENT

## 2020-08-25 DIAGNOSIS — R53.1 WEAKNESS: ICD-10-CM

## 2020-08-25 PROCEDURE — 97110 THERAPEUTIC EXERCISES: CPT | Mod: PO

## 2020-08-25 NOTE — PROGRESS NOTES
Occupational Therapy Daily Treatment Note       Date: 8/25/2020  Name: Lizet Palencia  [facility] Number: [id number]    Therapy Diagnosis:   Encounter Diagnosis   Name Primary?    Weakness      Physician: Marta Louis MD    Physician Orders: [id number] (ICD-10-CM) - Right hand weakness and treat ; 2x/wk x 6 wks , Modalities prn  Medical Diagnosis:   Evaluation Date: 8/14/2020  Insurance Authorization Expiration date : 8/3/2021  Plan of Care Certification Period: 9/14/2020  Date of Return to MD: no set date      Visit # / Visits authorized: 1 / 1  Time In: 10:45 am   Time Out: 11:15 am  Total Billable Time: 30 minutes           Subjective     Pt reports: Started HEP    she was compliant with home exercise program given last session.   Response to previous treatment: started HEP and does not feel sore       Pain: 2/10 after typing   Location: right wrists      Objective    received the following supervised modalities after being cleared for contradictions for 10 minutes:     -  Lizet received moist  Heat  to  Right wrist/ hand(s) for 10 minutes to increase blood flow, circulation, pain management and for tissue elasticity prior to therex.         Lizet   received the following direct contact modalities after being cleared for contraindications for  5 minutes:    -Patient receives ultrasound  for pain control and remold scar tissue to increase tissue elasticity @ 20 duty cycle, 3.3 Mhz, applied to 5, intensity = 0.6 w/cm2 for 8 minutes.         Lizet   received the following manual therapy techniques to increase joint mobilization and soft tissue mobilization for 16 minutes:          myofascial cupping X  8  minutes for loosening soft tissue,  improve scar mobility, release  tissue restrictions, decrease muscle tension  and pain, improve blood flow and increase function of restricted tissues    CHT performed  Instrument Assisted Soft Tissue Mobilization  stimulating tissue  turnover, scar tissue resorption, and the regeneration of tendons, muscles and other soft tissue structures x 8  minutes          Lizet  participated in dynamic functional therapeutic exercises to improve functional performance while increasing strength, endurance, ROM,  and flexibility  for 15  minutes, including:    - 30 gm finger weights for Rf and SF  Extension x 30 reps  - yellow dig extend x 2 minutes  - red CP PIP flex with DIP extended  - lumbrical strengthening with yellow putty and #5 puttyciser x 3 minutes        Home Exercises and Education Provided     Education provided:  - discussed insurance limitation  - Progress towards goals  - Pt also instructed on importance of attention to postural alignment during rest and activity to decrease compensatory movement patterns.       Written Home Exercises Provided: Patient instructed to cont prior HEP.  Exercises were reviewed and Lizet was able to demonstrate them prior to the end of the session.  Lizet demonstrated good  understanding of the HEP provided.   .   See EMR under Patient Instructions for exercises provided prior visit.       Assessment     Pt would continue to benefit from skilled OT. Pt tolerated session well.      Lizet is progressing well towards her goals and there are no updates to goals at this time. Pt prognosis is Good.       The patient demonstrated proper understanding  of each exercise.Pt required verbal and tactile cues for all new techniques used .         Anticipated barriers to occupational therapy: N/A     Pt's spiritual, cultural and educational needs considered and pt agreeable to plan of care and goals.    Goals to be met in  4  weeks:     1) Patient to be IND with HEP and modalities for pain managment.  2) Patient will  Increase Active Range of motion 15-20 degrees in hand/wrist to increase functional hand use for ADLs/work/leisure activities.  3)Pt will increase  strength 10-20 lbs. to improve functional grasp for  ADLs/work/leisure activities.   4) Pt will increase key pinch to assist with turning key and all pinching activities.            Plan      Plan     Certification Period/Plan of care expiration: 8/14/2020 to 9/14/2020.     Outpatient Occupational Therapy 1 times weekly for 0 weeks to include the following interventions: Paraffin, Fluidotherapy, Manual therapy/joint mobilizations, Modalities for pain management, US 3 mhz, Therapeutic exercises/activities., Iontophoresis with 2.0 cc Dexamethasone, Strengthening, Orthotic Fabrication/Fit/Training and Energy Conservation.        ARCHANA Baldwin,  OTR/L, CHT  Occupational therapist, Certified Hand Therapist        ARCHANA Baldwin, OTR/L, CHT

## 2020-09-01 ENCOUNTER — TELEPHONE (OUTPATIENT)
Dept: INTERNAL MEDICINE | Facility: CLINIC | Age: 50
End: 2020-09-01

## 2020-09-01 ENCOUNTER — CLINICAL SUPPORT (OUTPATIENT)
Dept: REHABILITATION | Facility: HOSPITAL | Age: 50
End: 2020-09-01
Attending: FAMILY MEDICINE
Payer: OTHER GOVERNMENT

## 2020-09-01 DIAGNOSIS — R20.2 RIGHT HAND PARESTHESIA: ICD-10-CM

## 2020-09-01 DIAGNOSIS — R53.1 WEAKNESS: ICD-10-CM

## 2020-09-01 DIAGNOSIS — R29.898 RIGHT HAND WEAKNESS: Primary | ICD-10-CM

## 2020-09-01 PROCEDURE — 97110 THERAPEUTIC EXERCISES: CPT | Mod: PO

## 2020-09-01 PROCEDURE — 97035 APP MDLTY 1+ULTRASOUND EA 15: CPT | Mod: PO

## 2020-09-01 NOTE — TELEPHONE ENCOUNTER
Reviewed Dr. Louis note in coverage.  Looks like she has a chronic nerve injury on that side and she was looking at getting OT for the hand.  If no significant improvement, we could consider orthopedic consult if she would like, can place referral

## 2020-09-01 NOTE — TELEPHONE ENCOUNTER
Pt phoned she finished therapy on her hand, the therapist did not notice any improvement, she is still having numbness and stiffness, she wants to know what is the next step ?

## 2020-09-01 NOTE — TELEPHONE ENCOUNTER
----- Message from Avery Moore sent at 9/1/2020  1:38 PM CDT -----  Contact: Patient  Pt called and would like to have a nurse call her back    This is regarding the therapy she had on her hand    Pt can be reached at [phone]

## 2020-09-01 NOTE — PROGRESS NOTES
"                            Occupational Therapy Daily Treatment/Discharge Note       Date: 9/1/2020  Name: Lizet Palencia  Clinic Number: [id number]    Therapy Diagnosis:   Encounter Diagnosis   Name Primary?    Weakness      Physician: Marta Louis MD    Physician Orders: [id number] (ICD-10-CM) - Right hand weakness and treat ; 2x/wk x 6 wks , Modalities prn  Medical Diagnosis:   Evaluation Date: 8/14/2020  Insurance Authorization Expiration date : 8/3/2021  Plan of Care Certification Period: 9/14/2020  Date of Return to MD: no set date      Visit # / Visits authorized: 3/12  Time In: 11:45 am   Time Out: 12:30 pm  Total Billable Time: 30 minutes           Subjective     Pt reports: " It still feels the same."     she was compliant with home exercise program given last session.   Response to previous treatment:does not feel any different with HEP      Pain: 2/10 after typing   Location: right wrists      Objective    received the following supervised modalities after being cleared for contradictions for 10 minutes:     -  Lizet received moist  Heat  to  Right wrist/ hand(s) for 10 minutes to increase blood flow, circulation, pain management and for tissue elasticity prior to therex.         Lizet   received the following direct contact modalities after being cleared for contraindications for  5 minutes:    -Patient receives ultrasound  for pain control and remold scar tissue to increase tissue elasticity @ 20 duty cycle, 3.3 Mhz, applied to 5, intensity = 0.6 w/cm2 for 8 minutes.         Lizet   received the following manual therapy techniques to increase joint mobilization and soft tissue mobilization for 16 minutes:          myofascial cupping X  8  minutes for loosening soft tissue,  improve scar mobility, release  tissue restrictions, decrease muscle tension  and pain, improve blood flow and increase function of restricted tissues    CHT performed  Instrument Assisted Soft Tissue Mobilization "  stimulating tissue turnover, scar tissue resorption, and the regeneration of tendons, muscles and other soft tissue structures x 8  minutes          Lizet  participated in dynamic functional therapeutic exercises to improve functional performance while increasing strength, endurance, ROM,  and flexibility  for 15  minutes, including:    - 30 gm finger weights for Rf and SF  Extension x 30 reps  - yellow dig extend x 2 minutes  - red CP PIP flex with DIP extended  - lumbrical strengthening with yellow putty and #5 puttyciser x 3 minutes       Strength: (HOOD Dynamometer in lbs.) Average 3 trials, Position II:       09/01/2020 09/01/2020     Rung2 Right  Left   HOOD # 2 35#(-5) 46#        Key pinch : right - 6 psi  ;left -10 psi    Home Exercises and Education Provided     Education provided:  - discussed insurance limitation  - Progress towards goals  - Pt also instructed on importance of attention to postural alignment during rest and activity to decrease compensatory movement patterns.       Written Home Exercises Provided: Patient instructed to cont prior HEP.  Exercises were reviewed and Lizet was able to demonstrate them prior to the end of the session.  Lizet demonstrated good  understanding of the HEP provided.   .   See EMR under Patient Instructions for exercises provided prior visit.       Assessment     Pt would continue to benefit from skilled OT. Pt tolerated session well. NO difference with symptoms with HEP nor therapy. We are week 3.  strength is less today. Recommending she return to her  PCP for further evaluation as she is also having symptoms in her left hand. Went over nerve anatomy with patient.    One goals met     Lizet is progressing well towards her goals and there are no updates to goals at this time. Pt prognosis is Good.              Anticipated barriers to occupational therapy: N/A     Pt's spiritual, cultural and educational needs considered and pt agreeable to plan of  care and goals.    Goals met:     1) Patient to be IND with HEP and modalities for pain managment.(met)  2) Patient will  Increase Active Range of motion 15-20 degrees in hand/wrist to increase functional hand use for ADLs/work/leisure activities.(not met)  3)Pt will increase  strength 10-20 lbs. to improve functional grasp for ADLs/work/leisure activities. (not met)  4) Pt will increase key pinch to assist with turning key and all pinching activities. (not met)           Plan      Plan     Pt is being discharged from skilled OT at this time. Will follow up with her referring physician.         Maki Méndez, MOT,  OTR/L, CHT  Occupational therapist, Certified Hand Therapist

## 2020-09-21 ENCOUNTER — PATIENT OUTREACH (OUTPATIENT)
Dept: ADMINISTRATIVE | Facility: OTHER | Age: 50
End: 2020-09-21

## 2020-09-21 NOTE — PROGRESS NOTES
Health Maintenance Due   Topic Date Due    Pneumococcal Vaccine (Medium Risk) (1 of 1 - PPSV23) 02/28/1990    Influenza Vaccine (1) 08/01/2020     Updates were requested from care everywhere.  Chart was reviewed for overdue Proactive [facility] Encounters (LEYDA) topics (CRS, Breast Cancer Screening, Eye exam)  Health Maintenance has been updated.  LINKS immunization registry triggered.  Immunizations were reconciled.

## 2020-09-22 ENCOUNTER — OFFICE VISIT (OUTPATIENT)
Dept: ORTHOPEDICS | Facility: CLINIC | Age: 50
End: 2020-09-22
Payer: OTHER GOVERNMENT

## 2020-09-22 VITALS — BODY MASS INDEX: 35.35 KG/M2 | HEIGHT: 68 IN | WEIGHT: 233.25 LBS

## 2020-09-22 DIAGNOSIS — R29.898 RIGHT HAND WEAKNESS: ICD-10-CM

## 2020-09-22 DIAGNOSIS — G56.21 ULNAR NERVE PALSY OF RIGHT UPPER EXTREMITY: ICD-10-CM

## 2020-09-22 DIAGNOSIS — R20.2 RIGHT HAND PARESTHESIA: ICD-10-CM

## 2020-09-22 PROCEDURE — 99213 PR OFFICE/OUTPT VISIT, EST, LEVL III, 20-29 MIN: ICD-10-PCS | Mod: S$PBB,,, | Performed by: ORTHOPAEDIC SURGERY

## 2020-09-22 PROCEDURE — 99999 PR PBB SHADOW E&M-EST. PATIENT-LVL III: ICD-10-PCS | Mod: PBBFAC,,, | Performed by: ORTHOPAEDIC SURGERY

## 2020-09-22 PROCEDURE — 99999 PR PBB SHADOW E&M-EST. PATIENT-LVL III: CPT | Mod: PBBFAC,,, | Performed by: ORTHOPAEDIC SURGERY

## 2020-09-22 PROCEDURE — 99213 OFFICE O/P EST LOW 20 MIN: CPT | Mod: S$PBB,,, | Performed by: ORTHOPAEDIC SURGERY

## 2020-09-22 PROCEDURE — 99213 OFFICE O/P EST LOW 20 MIN: CPT | Mod: PBBFAC,PN | Performed by: ORTHOPAEDIC SURGERY

## 2020-09-22 NOTE — LETTER
September 22, 2020      Yosef Piña MD  [address]  [address]  [facility] [address]           [facility]  [address]  [facility] [address]  Phone: [phone]          Patient: Lizet Palencia   MR Number: [id number]   YOB: 1970   Date of Visit: 9/22/2020       Dear Dr. Yosef Piña:    Thank you for referring Lizet Palencia to me for evaluation. Attached you will find relevant portions of my assessment and plan of care.    If you have questions, please do not hesitate to call me. I look forward to following Lizet Palencia along with you.    Sincerely,    Joseph Ca Jr., MD    Enclosure  CC:  No Recipients    If you would like to receive this communication electronically, please contact [email or web address] or [phone] to request more information on [employer] access.    For providers and/or their staff who would like to refer a patient to [facility], please contact us through our one-stop-shop provider referral line, [facility], at [phone].    If you feel you have received this communication in error or would no longer like to receive these types of communications, please e-mail [email or web address]

## 2020-09-22 NOTE — PROGRESS NOTES
Subjective:      Patient ID: Lizet Palencia is a 50 y.o. female.    Chief Complaint: Pain of the Right Hand      HPI  Lizet Palencia is a  50 y.o. female presenting today for right hand symptoms.  There was a history of trauma.  Onset of symptoms began more than 10 years ago when she sustained a laceration to her right forearm which apparently involved the ulnar nerve  The nerve was repaired elsewhere but she continued to have difficulty using the right hand and since the injury she has had some intermittent numbness as well as clawing of the ring and small finger  She recently started some physical therapy but really has not helped much no numbness is reported.      Review of patient's allergies indicates:  No Known Allergies      Current Outpatient Medications   Medication Sig Dispense Refill    amLODIPine (NORVASC) 10 MG tablet Take 1 tablet (10 mg total) by mouth once daily. 90 tablet 4    aspirin 81 MG Chew Take 81 mg by mouth once daily.      atorvastatin (LIPITOR) 80 MG tablet Take 1 tablet (80 mg total) by mouth once daily. 90 tablet 4    carvediloL (COREG) 25 MG tablet TAKE 1 TABLET BY MOUTH TWICE DAILY WITH MEAL 180 tablet 4    cetirizine (ZYRTEC) 10 MG tablet Take 1 tablet (10 mg total) by mouth every evening. 30 tablet 0    chlorthalidone (HYGROTEN) 25 MG Tab Take 1 tablet (25 mg total) by mouth once daily. 90 tablet 4    fluticasone (FLONASE) 50 mcg/actuation nasal spray 2 sprays (100 mcg total) by Each Nare route once daily. 1 Bottle 0    losartan (COZAAR) 100 MG tablet Take 1 tablet (100 mg total) by mouth once daily. 90 tablet 4    potassium chloride (K-TAB) 20 mEq TAKE 1 TABLET BY MOUTH EVERY DAY 30 tablet 0    triamcinolone acetonide 0.1% (KENALOG) 0.1 % ointment Apply topically 2 (two) times daily as needed. 80 g 2     No current facility-administered medications for this visit.        Past Medical History:   Diagnosis Date    Accelerated hypertension 1/1/2015    Allergic  "rhinitis 1/3/2015    Coronary artery disease 2016    Coronary artery disease involving native coronary artery of native heart without angina pectoris 2016    Hepatitis C antibody test positive 2016    Negative HCV RNA 2016, will check again in 3 months but no detectable HCV virus HCV AB will likely remain positive for life    History of non-ST elevation myocardial infarction (NSTEMI) 2016    Menorrhagia     Obesity (BMI 30-39.9) 2015    Prediabetes 3/14/2018    Status post coronary artery stent placement 2015    LCx stent         Past Surgical History:   Procedure Laterality Date     SECTION, CLASSIC      x3    CORONARY STENT PLACEMENT      D&C Hysteroscopy      right arm surgery  2009    TUBAL LIGATION         Review of Systems:  ROS    OBJECTIVE:     PHYSICAL EXAM:  Height: 5' 8" (172.7 cm) Weight: 105.8 kg (233 lb 4 oz)  Vitals:    20 1010   Weight: 105.8 kg (233 lb 4 oz)   Height: 5' 8" (1.727 m)   PainSc: 0-No pain     Well developed, well nourished female in no acute distress  Alert and oriented x 3  HEENT- Normal exam  Lungs- Clear to auscultation  Heart- Regular rate and rhythm  Abdomen- Soft nontender  Extremity exam- examination of the right hand demonstrates clawing of the ring and small finger which is passively correctable  She does have good flexion of all digits  There is some weakness of the intrinsics which is minimal  Sensation is intact in all digits including the ring and small finger  In the forearm there is a well-healed laceration with a negative Tinel sign over the forearm area    RADIOGRAPHS:  None  Comments: I have personally reviewed the imaging and I agree with the above radiologist's report.    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    PLAN:  I think at this point some more physical therapy would be helpful  I think part of the problem is weakness " in the hand and this could improve over time  The other possibility would be surgery to correct the clawing of the ring and small finger and this might be helpful in terms of use of the hand but I do not think it would help with overall strength and explained that to her today  Fortunately she does not have a huge deficit in the hand but I I think we should try to improve her symptoms of possible  Follow-up 1-2 months       - We talked at length about the anatomy and pathophysiology of   Encounter Diagnoses   Name Primary?    Right hand weakness     Right hand paresthesia            Disclaimer: This note has been generated using voice-recognition software. There may be typographical errors that have been missed during proof-reading.

## 2020-10-22 ENCOUNTER — PATIENT OUTREACH (OUTPATIENT)
Dept: ADMINISTRATIVE | Facility: OTHER | Age: 50
End: 2020-10-22

## 2020-11-12 ENCOUNTER — HOSPITAL ENCOUNTER (OUTPATIENT)
Dept: RADIOLOGY | Facility: HOSPITAL | Age: 50
Discharge: HOME | End: 2020-11-12
Attending: FAMILY MEDICINE
Payer: OTHER GOVERNMENT

## 2020-11-12 DIAGNOSIS — Z12.31 ENCOUNTER FOR SCREENING MAMMOGRAM FOR BREAST CANCER: ICD-10-CM

## 2020-11-12 PROCEDURE — 77067 SCR MAMMO BI INCL CAD: CPT | Mod: TC

## 2020-11-12 PROCEDURE — 77063 MAMMO DIGITAL SCREENING BILAT WITH TOMO: ICD-10-PCS | Mod: 26,,, | Performed by: RADIOLOGY

## 2020-11-12 PROCEDURE — 77067 SCR MAMMO BI INCL CAD: CPT | Mod: 26,,, | Performed by: RADIOLOGY

## 2020-11-12 PROCEDURE — 77067 MAMMO DIGITAL SCREENING BILAT WITH TOMO: ICD-10-PCS | Mod: 26,,, | Performed by: RADIOLOGY

## 2020-11-12 PROCEDURE — 77063 BREAST TOMOSYNTHESIS BI: CPT | Mod: 26,,, | Performed by: RADIOLOGY

## 2020-11-23 ENCOUNTER — PATIENT OUTREACH (OUTPATIENT)
Dept: ADMINISTRATIVE | Facility: OTHER | Age: 50
End: 2020-11-23

## 2021-02-18 ENCOUNTER — TELEPHONE (OUTPATIENT)
Dept: FAMILY MEDICINE | Facility: CLINIC | Age: 51
End: 2021-02-18

## 2021-03-23 ENCOUNTER — OFFICE VISIT (OUTPATIENT)
Dept: URGENT CARE | Facility: CLINIC | Age: 51
End: 2021-03-23
Payer: OTHER GOVERNMENT

## 2021-03-23 VITALS
RESPIRATION RATE: 20 BRPM | SYSTOLIC BLOOD PRESSURE: 131 MMHG | WEIGHT: 233 LBS | HEART RATE: 71 BPM | HEIGHT: 68 IN | TEMPERATURE: 99 F | OXYGEN SATURATION: 100 % | DIASTOLIC BLOOD PRESSURE: 84 MMHG | BODY MASS INDEX: 35.31 KG/M2

## 2021-03-23 DIAGNOSIS — R51.9 SINUS HEADACHE: ICD-10-CM

## 2021-03-23 DIAGNOSIS — R09.81 NASAL CONGESTION: ICD-10-CM

## 2021-03-23 DIAGNOSIS — R05.9 COUGH: Primary | ICD-10-CM

## 2021-03-23 DIAGNOSIS — K21.9 GASTROESOPHAGEAL REFLUX DISEASE WITHOUT ESOPHAGITIS: ICD-10-CM

## 2021-03-23 DIAGNOSIS — J01.00 SUBACUTE MAXILLARY SINUSITIS: ICD-10-CM

## 2021-03-23 DIAGNOSIS — R49.0 HOARSENESS: ICD-10-CM

## 2021-03-23 PROCEDURE — 99214 OFFICE O/P EST MOD 30 MIN: CPT | Mod: S$GLB,,, | Performed by: FAMILY MEDICINE

## 2021-03-23 PROCEDURE — 99214 PR OFFICE/OUTPT VISIT, EST, LEVL IV, 30-39 MIN: ICD-10-PCS | Mod: S$GLB,,, | Performed by: FAMILY MEDICINE

## 2021-03-23 RX ORDER — POTASSIUM CHLORIDE 1500 MG/1
TABLET, EXTENDED RELEASE ORAL
COMMUNITY
Start: 2020-08-05 | End: 2021-04-28 | Stop reason: SDUPTHER

## 2021-03-23 RX ORDER — ESOMEPRAZOLE MAGNESIUM 40 MG/1
40 CAPSULE, DELAYED RELEASE ORAL
Qty: 30 CAPSULE | Refills: 3 | Status: SHIPPED | OUTPATIENT
Start: 2021-03-23 | End: 2021-04-28 | Stop reason: ALTCHOICE

## 2021-03-23 RX ORDER — FLUTICASONE PROPIONATE 50 MCG
2 SPRAY, SUSPENSION (ML) NASAL DAILY
Qty: 18 ML | Refills: 5 | Status: SHIPPED | OUTPATIENT
Start: 2021-03-23 | End: 2023-04-24 | Stop reason: ALTCHOICE

## 2021-03-23 RX ORDER — AMOXICILLIN 500 MG/1
1000 TABLET, FILM COATED ORAL EVERY 12 HOURS
Qty: 40 TABLET | Refills: 0 | Status: SHIPPED | OUTPATIENT
Start: 2021-03-23 | End: 2021-04-02

## 2021-04-28 ENCOUNTER — LAB VISIT (OUTPATIENT)
Dept: LAB | Facility: HOSPITAL | Age: 51
End: 2021-04-28
Attending: FAMILY MEDICINE
Payer: OTHER GOVERNMENT

## 2021-04-28 ENCOUNTER — OFFICE VISIT (OUTPATIENT)
Dept: FAMILY MEDICINE | Facility: CLINIC | Age: 51
End: 2021-04-28
Payer: OTHER GOVERNMENT

## 2021-04-28 VITALS
HEIGHT: 68 IN | OXYGEN SATURATION: 99 % | SYSTOLIC BLOOD PRESSURE: 134 MMHG | WEIGHT: 219.81 LBS | HEART RATE: 71 BPM | BODY MASS INDEX: 33.31 KG/M2 | DIASTOLIC BLOOD PRESSURE: 82 MMHG

## 2021-04-28 DIAGNOSIS — J30.9 ALLERGIC RHINITIS, UNSPECIFIED SEASONALITY, UNSPECIFIED TRIGGER: ICD-10-CM

## 2021-04-28 DIAGNOSIS — Z12.11 SCREEN FOR COLON CANCER: ICD-10-CM

## 2021-04-28 DIAGNOSIS — E55.9 VITAMIN D DEFICIENCY: ICD-10-CM

## 2021-04-28 DIAGNOSIS — Z01.419 ENCOUNTER FOR GYNECOLOGICAL EXAMINATION WITH PAPANICOLAOU SMEAR OF CERVIX: Primary | ICD-10-CM

## 2021-04-28 DIAGNOSIS — Z79.82 LONG-TERM USE OF ASPIRIN THERAPY: ICD-10-CM

## 2021-04-28 DIAGNOSIS — Z01.84 ENCOUNTER FOR ANTIBODY RESPONSE EXAMINATION: ICD-10-CM

## 2021-04-28 DIAGNOSIS — E66.09 CLASS 1 OBESITY DUE TO EXCESS CALORIES WITH SERIOUS COMORBIDITY AND BODY MASS INDEX (BMI) OF 33.0 TO 33.9 IN ADULT: ICD-10-CM

## 2021-04-28 DIAGNOSIS — R51.9 SINUS HEADACHE: ICD-10-CM

## 2021-04-28 DIAGNOSIS — Z79.899 MEDICATION MANAGEMENT: ICD-10-CM

## 2021-04-28 DIAGNOSIS — R73.03 PREDIABETES: ICD-10-CM

## 2021-04-28 DIAGNOSIS — I10 ESSENTIAL HYPERTENSION: ICD-10-CM

## 2021-04-28 DIAGNOSIS — T50.2X5A DIURETIC-INDUCED HYPOKALEMIA: ICD-10-CM

## 2021-04-28 DIAGNOSIS — E87.6 DIURETIC-INDUCED HYPOKALEMIA: ICD-10-CM

## 2021-04-28 DIAGNOSIS — Z12.31 ENCOUNTER FOR SCREENING MAMMOGRAM FOR BREAST CANCER: ICD-10-CM

## 2021-04-28 DIAGNOSIS — I10 ACCELERATED HYPERTENSION: ICD-10-CM

## 2021-04-28 DIAGNOSIS — I25.10 CORONARY ARTERY DISEASE INVOLVING NATIVE CORONARY ARTERY OF NATIVE HEART WITHOUT ANGINA PECTORIS: ICD-10-CM

## 2021-04-28 DIAGNOSIS — I25.2 HISTORY OF NON-ST ELEVATION MYOCARDIAL INFARCTION (NSTEMI): ICD-10-CM

## 2021-04-28 DIAGNOSIS — Z95.5 STATUS POST CORONARY ARTERY STENT PLACEMENT: ICD-10-CM

## 2021-04-28 LAB
25(OH)D3+25(OH)D2 SERPL-MCNC: 33 NG/ML (ref 30–96)
ALBUMIN SERPL BCP-MCNC: 3.8 G/DL (ref 3.5–5.2)
ALP SERPL-CCNC: 45 U/L (ref 55–135)
ALT SERPL W/O P-5'-P-CCNC: 14 U/L (ref 10–44)
ANION GAP SERPL CALC-SCNC: 10 MMOL/L (ref 8–16)
AST SERPL-CCNC: 16 U/L (ref 10–40)
BASOPHILS # BLD AUTO: 0.04 K/UL (ref 0–0.2)
BASOPHILS NFR BLD: 0.8 % (ref 0–1.9)
BILIRUB SERPL-MCNC: 0.6 MG/DL (ref 0.1–1)
BUN SERPL-MCNC: 22 MG/DL (ref 6–20)
CALCIUM SERPL-MCNC: 9.5 MG/DL (ref 8.7–10.5)
CHLORIDE SERPL-SCNC: 106 MMOL/L (ref 95–110)
CHOLEST SERPL-MCNC: 156 MG/DL (ref 120–199)
CHOLEST/HDLC SERPL: 3.7 {RATIO} (ref 2–5)
CO2 SERPL-SCNC: 25 MMOL/L (ref 23–29)
CREAT SERPL-MCNC: 1.3 MG/DL (ref 0.5–1.4)
DIFFERENTIAL METHOD: NORMAL
EOSINOPHIL # BLD AUTO: 0.1 K/UL (ref 0–0.5)
EOSINOPHIL NFR BLD: 1.4 % (ref 0–8)
ERYTHROCYTE [DISTWIDTH] IN BLOOD BY AUTOMATED COUNT: 13.2 % (ref 11.5–14.5)
EST. GFR  (AFRICAN AMERICAN): 55 ML/MIN/1.73 M^2
EST. GFR  (NON AFRICAN AMERICAN): 48 ML/MIN/1.73 M^2
ESTIMATED AVG GLUCOSE: 120 MG/DL (ref 68–131)
GLUCOSE SERPL-MCNC: 96 MG/DL (ref 70–110)
HBA1C MFR BLD: 5.8 % (ref 4–5.6)
HCT VFR BLD AUTO: 41.5 % (ref 37–48.5)
HDLC SERPL-MCNC: 42 MG/DL (ref 40–75)
HDLC SERPL: 26.9 % (ref 20–50)
HGB BLD-MCNC: 13.4 G/DL (ref 12–16)
IMM GRANULOCYTES # BLD AUTO: 0.01 K/UL (ref 0–0.04)
IMM GRANULOCYTES NFR BLD AUTO: 0.2 % (ref 0–0.5)
LDLC SERPL CALC-MCNC: 105.2 MG/DL (ref 63–159)
LYMPHOCYTES # BLD AUTO: 1.8 K/UL (ref 1–4.8)
LYMPHOCYTES NFR BLD: 35.3 % (ref 18–48)
MCH RBC QN AUTO: 30.6 PG (ref 27–31)
MCHC RBC AUTO-ENTMCNC: 32.3 G/DL (ref 32–36)
MCV RBC AUTO: 95 FL (ref 82–98)
MONOCYTES # BLD AUTO: 0.7 K/UL (ref 0.3–1)
MONOCYTES NFR BLD: 14.4 % (ref 4–15)
NEUTROPHILS # BLD AUTO: 2.4 K/UL (ref 1.8–7.7)
NEUTROPHILS NFR BLD: 47.9 % (ref 38–73)
NONHDLC SERPL-MCNC: 114 MG/DL
NRBC BLD-RTO: 0 /100 WBC
PLATELET # BLD AUTO: 187 K/UL (ref 150–450)
PMV BLD AUTO: 11.1 FL (ref 9.2–12.9)
POTASSIUM SERPL-SCNC: 3.7 MMOL/L (ref 3.5–5.1)
PROT SERPL-MCNC: 7.4 G/DL (ref 6–8.4)
RBC # BLD AUTO: 4.38 M/UL (ref 4–5.4)
SARS-COV-2 IGG SERPLBLD QL IA.RAPID: POSITIVE
SODIUM SERPL-SCNC: 141 MMOL/L (ref 136–145)
TRIGL SERPL-MCNC: 44 MG/DL (ref 30–150)
TSH SERPL DL<=0.005 MIU/L-ACNC: 0.55 UIU/ML (ref 0.4–4)
WBC # BLD AUTO: 4.99 K/UL (ref 3.9–12.7)

## 2021-04-28 PROCEDURE — 99999 PR PBB SHADOW E&M-EST. PATIENT-LVL III: CPT | Mod: PBBFAC,,, | Performed by: FAMILY MEDICINE

## 2021-04-28 PROCEDURE — 86769 SARS-COV-2 COVID-19 ANTIBODY: CPT | Performed by: FAMILY MEDICINE

## 2021-04-28 PROCEDURE — 83036 HEMOGLOBIN GLYCOSYLATED A1C: CPT | Performed by: FAMILY MEDICINE

## 2021-04-28 PROCEDURE — 82306 VITAMIN D 25 HYDROXY: CPT | Performed by: FAMILY MEDICINE

## 2021-04-28 PROCEDURE — 87624 HPV HI-RISK TYP POOLED RSLT: CPT | Performed by: FAMILY MEDICINE

## 2021-04-28 PROCEDURE — 80061 LIPID PANEL: CPT | Performed by: FAMILY MEDICINE

## 2021-04-28 PROCEDURE — 99396 PREV VISIT EST AGE 40-64: CPT | Mod: S$PBB,,, | Performed by: FAMILY MEDICINE

## 2021-04-28 PROCEDURE — 99999 PR PBB SHADOW E&M-EST. PATIENT-LVL III: ICD-10-PCS | Mod: PBBFAC,,, | Performed by: FAMILY MEDICINE

## 2021-04-28 PROCEDURE — 85025 COMPLETE CBC W/AUTO DIFF WBC: CPT | Performed by: FAMILY MEDICINE

## 2021-04-28 PROCEDURE — 80053 COMPREHEN METABOLIC PANEL: CPT | Performed by: FAMILY MEDICINE

## 2021-04-28 PROCEDURE — 88175 CYTOPATH C/V AUTO FLUID REDO: CPT | Performed by: FAMILY MEDICINE

## 2021-04-28 PROCEDURE — 99396 PR PREVENTIVE VISIT,EST,40-64: ICD-10-PCS | Mod: S$PBB,,, | Performed by: FAMILY MEDICINE

## 2021-04-28 PROCEDURE — 36415 COLL VENOUS BLD VENIPUNCTURE: CPT | Performed by: FAMILY MEDICINE

## 2021-04-28 PROCEDURE — 84443 ASSAY THYROID STIM HORMONE: CPT | Performed by: FAMILY MEDICINE

## 2021-04-28 PROCEDURE — 99213 OFFICE O/P EST LOW 20 MIN: CPT | Mod: PBBFAC,PO | Performed by: FAMILY MEDICINE

## 2021-04-28 RX ORDER — ATORVASTATIN CALCIUM 80 MG/1
80 TABLET, FILM COATED ORAL DAILY
Qty: 90 TABLET | Refills: 4 | Status: SHIPPED | OUTPATIENT
Start: 2021-04-28 | End: 2022-10-18 | Stop reason: SDUPTHER

## 2021-04-28 RX ORDER — CETIRIZINE HYDROCHLORIDE 10 MG/1
10 TABLET ORAL NIGHTLY PRN
Qty: 30 TABLET | Refills: 0 | Status: SHIPPED | OUTPATIENT
Start: 2021-04-28 | End: 2021-06-07 | Stop reason: ALTCHOICE

## 2021-04-28 RX ORDER — LOSARTAN POTASSIUM 100 MG/1
100 TABLET ORAL DAILY
Qty: 90 TABLET | Refills: 4 | Status: SHIPPED | OUTPATIENT
Start: 2021-04-28 | End: 2022-10-18 | Stop reason: SDUPTHER

## 2021-04-28 RX ORDER — CARVEDILOL 25 MG/1
TABLET ORAL
Qty: 180 TABLET | Refills: 4 | Status: SHIPPED | OUTPATIENT
Start: 2021-04-28 | End: 2022-10-18 | Stop reason: SDUPTHER

## 2021-04-28 RX ORDER — AMLODIPINE BESYLATE 10 MG/1
10 TABLET ORAL DAILY
Qty: 90 TABLET | Refills: 4 | Status: SHIPPED | OUTPATIENT
Start: 2021-04-28 | End: 2022-10-18 | Stop reason: SDUPTHER

## 2021-04-28 RX ORDER — POTASSIUM CHLORIDE 1500 MG/1
20 TABLET, EXTENDED RELEASE ORAL DAILY
Qty: 90 TABLET | Refills: 4 | Status: SHIPPED | OUTPATIENT
Start: 2021-04-28 | End: 2023-04-24 | Stop reason: SDUPTHER

## 2021-04-28 RX ORDER — CHLORTHALIDONE 25 MG/1
25 TABLET ORAL DAILY
Qty: 90 TABLET | Refills: 4 | Status: SHIPPED | OUTPATIENT
Start: 2021-04-28 | End: 2022-10-18 | Stop reason: SDUPTHER

## 2021-05-01 ENCOUNTER — TELEPHONE (OUTPATIENT)
Dept: FAMILY MEDICINE | Facility: CLINIC | Age: 51
End: 2021-05-01

## 2021-05-01 DIAGNOSIS — I25.10 CORONARY ARTERY DISEASE INVOLVING NATIVE CORONARY ARTERY OF NATIVE HEART WITHOUT ANGINA PECTORIS: Primary | ICD-10-CM

## 2021-05-03 LAB
HPV HR 12 DNA SPEC QL NAA+PROBE: NEGATIVE
HPV16 AG SPEC QL: NEGATIVE
HPV18 DNA SPEC QL NAA+PROBE: NEGATIVE

## 2021-05-04 ENCOUNTER — PATIENT OUTREACH (OUTPATIENT)
Dept: ADMINISTRATIVE | Facility: OTHER | Age: 51
End: 2021-05-04

## 2021-05-05 LAB
FINAL PATHOLOGIC DIAGNOSIS: NORMAL
Lab: NORMAL

## 2021-05-06 ENCOUNTER — OFFICE VISIT (OUTPATIENT)
Dept: CARDIOLOGY | Facility: CLINIC | Age: 51
End: 2021-05-06
Payer: OTHER GOVERNMENT

## 2021-05-06 VITALS
BODY MASS INDEX: 33.79 KG/M2 | SYSTOLIC BLOOD PRESSURE: 121 MMHG | HEART RATE: 67 BPM | DIASTOLIC BLOOD PRESSURE: 82 MMHG | WEIGHT: 222.25 LBS | OXYGEN SATURATION: 98 %

## 2021-05-06 DIAGNOSIS — I10 ESSENTIAL HYPERTENSION: ICD-10-CM

## 2021-05-06 DIAGNOSIS — Z95.5 STATUS POST CORONARY ARTERY STENT PLACEMENT: ICD-10-CM

## 2021-05-06 DIAGNOSIS — I25.10 CORONARY ARTERY DISEASE INVOLVING NATIVE CORONARY ARTERY OF NATIVE HEART WITHOUT ANGINA PECTORIS: ICD-10-CM

## 2021-05-06 DIAGNOSIS — I25.2 HISTORY OF NON-ST ELEVATION MYOCARDIAL INFARCTION (NSTEMI): Primary | ICD-10-CM

## 2021-05-06 DIAGNOSIS — E66.09 CLASS 1 OBESITY DUE TO EXCESS CALORIES WITH SERIOUS COMORBIDITY AND BODY MASS INDEX (BMI) OF 33.0 TO 33.9 IN ADULT: ICD-10-CM

## 2021-05-06 PROCEDURE — 99213 OFFICE O/P EST LOW 20 MIN: CPT | Mod: PBBFAC,PO | Performed by: INTERNAL MEDICINE

## 2021-05-06 PROCEDURE — 99214 OFFICE O/P EST MOD 30 MIN: CPT | Mod: S$PBB,,, | Performed by: INTERNAL MEDICINE

## 2021-05-06 PROCEDURE — 99999 PR PBB SHADOW E&M-EST. PATIENT-LVL III: ICD-10-PCS | Mod: PBBFAC,,, | Performed by: INTERNAL MEDICINE

## 2021-05-06 PROCEDURE — 99999 PR PBB SHADOW E&M-EST. PATIENT-LVL III: CPT | Mod: PBBFAC,,, | Performed by: INTERNAL MEDICINE

## 2021-05-06 PROCEDURE — 99214 PR OFFICE/OUTPT VISIT, EST, LEVL IV, 30-39 MIN: ICD-10-PCS | Mod: S$PBB,,, | Performed by: INTERNAL MEDICINE

## 2021-05-06 RX ORDER — EZETIMIBE 10 MG/1
10 TABLET ORAL DAILY
Qty: 90 TABLET | Refills: 3 | Status: SHIPPED | OUTPATIENT
Start: 2021-05-06 | End: 2022-10-20 | Stop reason: SDUPTHER

## 2021-05-14 ENCOUNTER — TELEPHONE (OUTPATIENT)
Dept: CARDIAC REHAB | Facility: CLINIC | Age: 51
End: 2021-05-14

## 2021-05-18 ENCOUNTER — OFFICE VISIT (OUTPATIENT)
Dept: URGENT CARE | Facility: CLINIC | Age: 51
End: 2021-05-18
Payer: OTHER GOVERNMENT

## 2021-05-18 VITALS
SYSTOLIC BLOOD PRESSURE: 123 MMHG | HEART RATE: 78 BPM | HEIGHT: 68 IN | OXYGEN SATURATION: 99 % | DIASTOLIC BLOOD PRESSURE: 80 MMHG | TEMPERATURE: 99 F | RESPIRATION RATE: 20 BRPM | WEIGHT: 226 LBS | BODY MASS INDEX: 34.25 KG/M2

## 2021-05-18 DIAGNOSIS — J30.2 SEASONAL ALLERGIC RHINITIS, UNSPECIFIED TRIGGER: Primary | ICD-10-CM

## 2021-05-18 DIAGNOSIS — Z11.59 SCREENING FOR VIRAL DISEASE: ICD-10-CM

## 2021-05-18 DIAGNOSIS — R05.9 COUGH: ICD-10-CM

## 2021-05-18 DIAGNOSIS — R09.81 NASAL CONGESTION: ICD-10-CM

## 2021-05-18 DIAGNOSIS — R09.82 POST-NASAL DRIP: ICD-10-CM

## 2021-05-18 LAB
CTP QC/QA: YES
SARS-COV-2 RDRP RESP QL NAA+PROBE: NEGATIVE

## 2021-05-18 PROCEDURE — 99214 PR OFFICE/OUTPT VISIT, EST, LEVL IV, 30-39 MIN: ICD-10-PCS | Mod: S$GLB,,, | Performed by: NURSE PRACTITIONER

## 2021-05-18 PROCEDURE — 99214 OFFICE O/P EST MOD 30 MIN: CPT | Mod: S$GLB,,, | Performed by: NURSE PRACTITIONER

## 2021-05-18 RX ORDER — LORATADINE 10 MG/1
10 TABLET ORAL DAILY
Qty: 30 TABLET | Refills: 0 | Status: SHIPPED | OUTPATIENT
Start: 2021-05-18 | End: 2021-06-07 | Stop reason: ALTCHOICE

## 2021-05-18 RX ORDER — FLUTICASONE PROPIONATE 50 MCG
2 SPRAY, SUSPENSION (ML) NASAL DAILY
Qty: 15.8 ML | Refills: 0 | Status: SHIPPED | OUTPATIENT
Start: 2021-05-18 | End: 2023-04-24 | Stop reason: ALTCHOICE

## 2021-05-18 RX ORDER — PROMETHAZINE HYDROCHLORIDE AND DEXTROMETHORPHAN HYDROBROMIDE 6.25; 15 MG/5ML; MG/5ML
5 SYRUP ORAL NIGHTLY PRN
Qty: 120 ML | Refills: 0 | Status: SHIPPED | OUTPATIENT
Start: 2021-05-18 | End: 2021-05-28

## 2021-06-02 ENCOUNTER — TELEPHONE (OUTPATIENT)
Dept: CARDIAC REHAB | Facility: CLINIC | Age: 51
End: 2021-06-02

## 2021-06-07 ENCOUNTER — OFFICE VISIT (OUTPATIENT)
Dept: OTOLARYNGOLOGY | Facility: CLINIC | Age: 51
End: 2021-06-07
Payer: OTHER GOVERNMENT

## 2021-06-07 DIAGNOSIS — J02.9 SORE THROAT: ICD-10-CM

## 2021-06-07 DIAGNOSIS — J31.0 CHRONIC RHINITIS: Primary | ICD-10-CM

## 2021-06-07 PROCEDURE — 99213 OFFICE O/P EST LOW 20 MIN: CPT | Mod: PBBFAC,PN | Performed by: NURSE PRACTITIONER

## 2021-06-07 PROCEDURE — 99999 PR PBB SHADOW E&M-EST. PATIENT-LVL III: ICD-10-PCS | Mod: PBBFAC,,, | Performed by: NURSE PRACTITIONER

## 2021-06-07 PROCEDURE — 99203 PR OFFICE/OUTPT VISIT, NEW, LEVL III, 30-44 MIN: ICD-10-PCS | Mod: S$PBB,,, | Performed by: NURSE PRACTITIONER

## 2021-06-07 PROCEDURE — 99203 OFFICE O/P NEW LOW 30 MIN: CPT | Mod: S$PBB,,, | Performed by: NURSE PRACTITIONER

## 2021-06-07 PROCEDURE — 99999 PR PBB SHADOW E&M-EST. PATIENT-LVL III: CPT | Mod: PBBFAC,,, | Performed by: NURSE PRACTITIONER

## 2021-06-07 RX ORDER — CETIRIZINE HYDROCHLORIDE 10 MG/1
10 TABLET ORAL DAILY
Qty: 30 TABLET | Refills: 2 | Status: SHIPPED | OUTPATIENT
Start: 2021-06-07 | End: 2024-01-29

## 2021-06-09 ENCOUNTER — TELEPHONE (OUTPATIENT)
Dept: CARDIAC REHAB | Facility: CLINIC | Age: 51
End: 2021-06-09

## 2021-07-30 ENCOUNTER — TELEPHONE (OUTPATIENT)
Dept: FAMILY MEDICINE | Facility: CLINIC | Age: 51
End: 2021-07-30

## 2021-09-05 ENCOUNTER — TELEPHONE (OUTPATIENT)
Dept: FAMILY MEDICINE | Facility: CLINIC | Age: 51
End: 2021-09-05

## 2021-09-10 ENCOUNTER — LAB VISIT (OUTPATIENT)
Dept: LAB | Facility: HOSPITAL | Age: 51
End: 2021-09-10
Attending: INTERNAL MEDICINE
Payer: OTHER GOVERNMENT

## 2021-09-10 DIAGNOSIS — I25.2 HISTORY OF NON-ST ELEVATION MYOCARDIAL INFARCTION (NSTEMI): ICD-10-CM

## 2021-09-10 DIAGNOSIS — I25.10 CORONARY ARTERY DISEASE INVOLVING NATIVE CORONARY ARTERY OF NATIVE HEART WITHOUT ANGINA PECTORIS: ICD-10-CM

## 2021-09-10 DIAGNOSIS — Z95.5 STATUS POST CORONARY ARTERY STENT PLACEMENT: ICD-10-CM

## 2021-09-10 LAB
CHOLEST SERPL-MCNC: 129 MG/DL (ref 120–199)
CHOLEST/HDLC SERPL: 3 {RATIO} (ref 2–5)
HDLC SERPL-MCNC: 43 MG/DL (ref 40–75)
HDLC SERPL: 33.3 % (ref 20–50)
LDLC SERPL CALC-MCNC: 73.6 MG/DL (ref 63–159)
NONHDLC SERPL-MCNC: 86 MG/DL
TRIGL SERPL-MCNC: 62 MG/DL (ref 30–150)

## 2021-09-10 PROCEDURE — 36415 COLL VENOUS BLD VENIPUNCTURE: CPT | Performed by: INTERNAL MEDICINE

## 2021-09-10 PROCEDURE — 80061 LIPID PANEL: CPT | Performed by: INTERNAL MEDICINE

## 2021-10-04 ENCOUNTER — PATIENT MESSAGE (OUTPATIENT)
Dept: ADMINISTRATIVE | Facility: HOSPITAL | Age: 51
End: 2021-10-04

## 2021-10-11 ENCOUNTER — PATIENT OUTREACH (OUTPATIENT)
Dept: ADMINISTRATIVE | Facility: HOSPITAL | Age: 51
End: 2021-10-11

## 2021-10-22 ENCOUNTER — TELEPHONE (OUTPATIENT)
Dept: FAMILY MEDICINE | Facility: CLINIC | Age: 51
End: 2021-10-22

## 2021-10-25 ENCOUNTER — LAB VISIT (OUTPATIENT)
Dept: LAB | Facility: HOSPITAL | Age: 51
End: 2021-10-25
Attending: FAMILY MEDICINE
Payer: OTHER GOVERNMENT

## 2021-10-25 DIAGNOSIS — Z12.12 ENCOUNTER FOR COLORECTAL CANCER SCREENING: ICD-10-CM

## 2021-10-25 DIAGNOSIS — Z12.11 ENCOUNTER FOR COLORECTAL CANCER SCREENING: ICD-10-CM

## 2021-10-25 PROCEDURE — 82274 ASSAY TEST FOR BLOOD FECAL: CPT | Performed by: FAMILY MEDICINE

## 2021-10-26 ENCOUNTER — TELEPHONE (OUTPATIENT)
Dept: FAMILY MEDICINE | Facility: CLINIC | Age: 51
End: 2021-10-26
Payer: OTHER GOVERNMENT

## 2021-11-08 DIAGNOSIS — Z12.11 ENCOUNTER FOR COLORECTAL CANCER SCREENING: Primary | ICD-10-CM

## 2021-11-08 DIAGNOSIS — Z12.12 ENCOUNTER FOR COLORECTAL CANCER SCREENING: Primary | ICD-10-CM

## 2021-11-11 LAB — HEMOCCULT STL QL IA: NEGATIVE

## 2021-12-03 ENCOUNTER — HOSPITAL ENCOUNTER (OUTPATIENT)
Dept: RADIOLOGY | Facility: HOSPITAL | Age: 51
Discharge: HOME | End: 2021-12-03
Attending: FAMILY MEDICINE
Payer: OTHER GOVERNMENT

## 2021-12-03 VITALS — HEIGHT: 68 IN | WEIGHT: 226 LBS | BODY MASS INDEX: 34.25 KG/M2

## 2021-12-03 DIAGNOSIS — Z01.419 ENCOUNTER FOR GYNECOLOGICAL EXAMINATION WITH PAPANICOLAOU SMEAR OF CERVIX: ICD-10-CM

## 2021-12-03 DIAGNOSIS — Z12.31 ENCOUNTER FOR SCREENING MAMMOGRAM FOR BREAST CANCER: ICD-10-CM

## 2021-12-03 PROCEDURE — 77067 SCR MAMMO BI INCL CAD: CPT | Mod: 26,,, | Performed by: RADIOLOGY

## 2021-12-03 PROCEDURE — 77067 MAMMO DIGITAL SCREENING BILAT: ICD-10-PCS | Mod: 26,,, | Performed by: RADIOLOGY

## 2021-12-03 PROCEDURE — 77067 SCR MAMMO BI INCL CAD: CPT | Mod: TC

## 2022-01-07 ENCOUNTER — TELEPHONE (OUTPATIENT)
Dept: FAMILY MEDICINE | Facility: CLINIC | Age: 52
End: 2022-01-07
Payer: OTHER GOVERNMENT

## 2022-01-07 NOTE — TELEPHONE ENCOUNTER
----- Message from Brenna Bowen sent at 1/7/2022  8:52 AM CST -----  Contact: [phone]/ self  Type:  Sooner Apoointment Request    Caller is requesting a sooner appointment.  Caller declined first available appointment listed below.  Caller will not accept being placed on the waitlist and is requesting a message be sent to doctor.  Name of Caller: pt   When is the first available appointment? 01/21  Symptoms: legs   Would the patient rather a call back or a response via [facility]?  Call back   Best Call Back Number: [phone]  Additional Information:

## 2022-01-24 ENCOUNTER — TELEPHONE (OUTPATIENT)
Dept: FAMILY MEDICINE | Facility: CLINIC | Age: 52
End: 2022-01-24
Payer: OTHER GOVERNMENT

## 2022-01-24 ENCOUNTER — OFFICE VISIT (OUTPATIENT)
Dept: FAMILY MEDICINE | Facility: CLINIC | Age: 52
End: 2022-01-24
Payer: OTHER GOVERNMENT

## 2022-01-24 VITALS
SYSTOLIC BLOOD PRESSURE: 124 MMHG | BODY MASS INDEX: 36.09 KG/M2 | DIASTOLIC BLOOD PRESSURE: 85 MMHG | OXYGEN SATURATION: 98 % | WEIGHT: 238.13 LBS | HEIGHT: 68 IN | HEART RATE: 73 BPM

## 2022-01-24 DIAGNOSIS — M53.3 SACROILIAC JOINT DYSFUNCTION OF LEFT SIDE: ICD-10-CM

## 2022-01-24 DIAGNOSIS — I25.2 HISTORY OF NON-ST ELEVATION MYOCARDIAL INFARCTION (NSTEMI): ICD-10-CM

## 2022-01-24 DIAGNOSIS — I25.10 CORONARY ARTERY DISEASE INVOLVING NATIVE CORONARY ARTERY OF NATIVE HEART WITHOUT ANGINA PECTORIS: ICD-10-CM

## 2022-01-24 DIAGNOSIS — M54.42 ACUTE LEFT-SIDED LOW BACK PAIN WITH LEFT-SIDED SCIATICA: Primary | ICD-10-CM

## 2022-01-24 DIAGNOSIS — Z00.00 LABORATORY EXAMINATION ORDERED AS PART OF A ROUTINE GENERAL MEDICAL EXAMINATION: Primary | ICD-10-CM

## 2022-01-24 DIAGNOSIS — I10 ESSENTIAL HYPERTENSION: ICD-10-CM

## 2022-01-24 DIAGNOSIS — M76.32 IT BAND SYNDROME, LEFT: ICD-10-CM

## 2022-01-24 DIAGNOSIS — R73.03 PREDIABETES: ICD-10-CM

## 2022-01-24 PROCEDURE — 99214 OFFICE O/P EST MOD 30 MIN: CPT | Mod: S$PBB,,, | Performed by: FAMILY MEDICINE

## 2022-01-24 PROCEDURE — 99999 PR PBB SHADOW E&M-EST. PATIENT-LVL V: CPT | Mod: PBBFAC,,, | Performed by: FAMILY MEDICINE

## 2022-01-24 PROCEDURE — 99214 PR OFFICE/OUTPT VISIT, EST, LEVL IV, 30-39 MIN: ICD-10-PCS | Mod: S$PBB,,, | Performed by: FAMILY MEDICINE

## 2022-01-24 PROCEDURE — 99215 OFFICE O/P EST HI 40 MIN: CPT | Mod: PBBFAC,PO | Performed by: FAMILY MEDICINE

## 2022-01-24 PROCEDURE — 99999 PR PBB SHADOW E&M-EST. PATIENT-LVL V: ICD-10-PCS | Mod: PBBFAC,,, | Performed by: FAMILY MEDICINE

## 2022-01-24 NOTE — PROGRESS NOTES
Office Visit    Patient Name: Lizet Palencia    : 1970  MRN: [id number]    Subjective:  Lizet is a 51 y.o. female who presents today for:    Leg Pain (Left side of leg is in pain )    51-year-old patient of mine, seen by me for annual physical 2021 and with chronic conditions that include CAD w/ h/o stent/NSTEMI, HTN, prediabetes(A1c 5.9 20), HLD, Obesity, who presents today for urgent care visit to discuss worsening left-sided low back pain that is now radiating into her leg down to her knee.  She has had this pain intermittently off and on for a long time but it is becoming more constant and bothersome on a regular basis.    She only has pain on the left side.  It is often focused in the left buttock region but she also has some left low back and mid back pain as well.  She has pain that radiates intermittently into her right thigh down to her knee.  She does not have radiation beyond her knee.  She does not have overt leg weakness that recently she has experienced a sensation like her leg may give out and she takes a brief pause before taking the next step.    She has not tried therapy previously for this pain.  She does not feel that she needs pain medication but would like to address the cause.    Of note, she has gained about 10 lb over the last 6 months.    She is also due for cardiology follow-up.        PAST MEDICAL HISTORY, SURGICAL/SOCIAL/FAMILY HISTORY REVIEWED AS PER CHART, WITH PERTINENT FINDINGS INCLUDED IN HISTORY SECTION OF NOTE.     Current Medications    Medication List with Changes/Refills   Current Medications    AMLODIPINE (NORVASC) 10 MG TABLET    Take 1 tablet (10 mg total) by mouth once daily.    ASPIRIN 81 MG CHEW    Take 81 mg by mouth once daily.    ATORVASTATIN (LIPITOR) 80 MG TABLET    Take 1 tablet (80 mg total) by mouth once daily.    CARVEDILOL (COREG) 25 MG TABLET    TAKE 1 TABLET BY MOUTH TWICE DAILY WITH MEAL    CETIRIZINE (ZYRTEC) 10 MG TABLET    Take 1  "tablet (10 mg total) by mouth once daily.    CHLORTHALIDONE (HYGROTEN) 25 MG TAB    Take 1 tablet (25 mg total) by mouth once daily.    EZETIMIBE (ZETIA) 10 MG TABLET    Take 1 tablet (10 mg total) by mouth once daily.    FLUTICASONE PROPIONATE (FLONASE) 50 MCG/ACTUATION NASAL SPRAY    2 sprays (100 mcg total) by Each Nostril route once daily.    FLUTICASONE PROPIONATE (FLONASE) 50 MCG/ACTUATION NASAL SPRAY    2 sprays (100 mcg total) by Each Nostril route once daily.    LOSARTAN (COZAAR) 100 MG TABLET    Take 1 tablet (100 mg total) by mouth once daily.    POTASSIUM CHLORIDE (K-TAB) 20 MEQ    Take 1 tablet (20 mEq total) by mouth once daily.    TRIAMCINOLONE ACETONIDE TOP           Allergies   Review of patient's allergies indicates:   Allergen Reactions    Crab Rash    Shrimp Rash         Review of Systems (Pertinent positives)  Review of Systems   Constitutional: Positive for unexpected weight change.   Respiratory: Negative for shortness of breath.    Cardiovascular: Negative for leg swelling.   Musculoskeletal: Positive for back pain.       /85 (BP Location: Right arm, Patient Position: Sitting)   Pulse 73   Ht 5' 8" (1.727 m)   Wt 108 kg (238 lb 1.6 oz)   SpO2 98%   BMI 36.20 kg/m²     Physical Exam  Vitals reviewed.   Constitutional:       General: She is not in acute distress.     Appearance: She is well-developed. She is obese.   HENT:      Head: Normocephalic and atraumatic.   Eyes:      Conjunctiva/sclera: Conjunctivae normal.   Cardiovascular:      Rate and Rhythm: Normal rate.   Pulmonary:      Effort: Pulmonary effort is normal.   Musculoskeletal:      Thoracic back: Spasms present.      Lumbar back: Spasms and tenderness present.        Back:       Left hip: Tenderness ( of greater trochanteric bursa) present.      Right lower leg: No edema.      Left lower leg: No edema.        Legs:    Skin:     General: Skin is warm and dry.   Neurological:      General: No focal deficit present.      " Mental Status: She is alert and oriented to person, place, and time.   Psychiatric:         Mood and Affect: Mood normal.         Behavior: Behavior normal.           Assessment/Plan:  Lizet Palencia is a 51 y.o. female who presents today for :        ICD-10-CM ICD-9-CM    1. Acute left-sided low back pain with left-sided sciatica  M54.42 724.2 Ambulatory referral/consult to Physical/Occupational Therapy     724.3    2. Sacroiliac joint dysfunction of left side  M53.3 724.6 Ambulatory referral/consult to Physical/Occupational Therapy   3. It band syndrome, left  M76.32 728.89 Ambulatory referral/consult to Physical/Occupational Therapy   4. History of non-ST elevation myocardial infarction (NSTEMI)  I25.2 412 Ambulatory referral/consult to Cardiology   5. Coronary artery disease involving native coronary artery of native heart without angina pectoris  I25.10 414.01 Ambulatory referral/consult to Cardiology   6. Prediabetes  R73.03 790.29    7. Essential hypertension  I10 401.9 Ambulatory referral/consult to Cardiology     Trial of physical therapy advised for myofascial pain suspected secondary to postural, core strength deficits causing left paraspinal muscle pain, spasm, left SI joint pain, IB band syndrome.    If insufficient response to physical therapy, could consider trial of pain management image guided steroid injection of the left SI joint, greater trochanteric bursa-will re-evaluate 90 days at the time of her physical.    Has multiple cardiac risk factors with history of NSTEMI and is due for cardiology [id number] placed.    Discussed working on weight loss for general health and also as a means of decreasing her low back and hip/leg pain.    There are no Patient Instructions on file for this visit.      Follow up in about 3 months (around 4/24/2022) for to follow up on lab results, return as needed for new concerns.

## 2022-01-25 ENCOUNTER — CLINICAL SUPPORT (OUTPATIENT)
Dept: REHABILITATION | Facility: HOSPITAL | Age: 52
End: 2022-01-25
Attending: FAMILY MEDICINE
Payer: OTHER GOVERNMENT

## 2022-01-25 DIAGNOSIS — M54.42 ACUTE LEFT-SIDED LOW BACK PAIN WITH LEFT-SIDED SCIATICA: ICD-10-CM

## 2022-01-25 DIAGNOSIS — M76.32 IT BAND SYNDROME, LEFT: ICD-10-CM

## 2022-01-25 DIAGNOSIS — M53.3 SACROILIAC JOINT DYSFUNCTION OF LEFT SIDE: ICD-10-CM

## 2022-01-25 DIAGNOSIS — M25.559 LATERAL PAIN OF HIP: ICD-10-CM

## 2022-01-25 DIAGNOSIS — R29.898 DECREASED STRENGTH OF LOWER EXTREMITY: ICD-10-CM

## 2022-01-25 PROCEDURE — 97110 THERAPEUTIC EXERCISES: CPT | Mod: PN

## 2022-01-25 PROCEDURE — 97161 PT EVAL LOW COMPLEX 20 MIN: CPT | Mod: PN

## 2022-01-25 NOTE — PLAN OF CARE
Physical Therapy Initial Evaluation     Name: Lizet Palencia  Clinic Number: [id number]    Therapy Diagnosis:   Encounter Diagnoses   Name Primary?    Acute left-sided low back pain with left-sided sciatica     Sacroiliac joint dysfunction of left side     It band syndrome, left     Lateral pain of hip     Decreased strength of lower extremity      Physician: Marta Louis MD    Physician Orders: PT Eval and Treat   Medical Diagnosis from Referral:   M54.42 (ICD-10-CM) - Acute left-sided low back pain with left-sided sciatica   M53.3 (ICD-10-CM) - Sacroiliac joint dysfunction of left side   M76.32 (ICD-10-CM) - It band syndrome, left     Evaluation Date: 2022  Authorization Period Expiration: 23  Plan of Care Expiration: 22  Visit # / Visits authorized:  pending    Time In: 945  Time Out: 1030  Total Billable Time: 45 minutes    Precautions: Standard    Subjective        Medical History:   Past Medical History:   Diagnosis Date    Accelerated hypertension 2015    Allergic rhinitis 1/3/2015    Coronary artery disease 2016    Coronary artery disease involving native coronary artery of native heart without angina pectoris 2016    Hepatitis C antibody test positive 2016    Negative HCV RNA 2016, will check again in 3 months but no detectable HCV virus HCV AB will likely remain positive for life    History of non-ST elevation myocardial infarction (NSTEMI) 2016    Menorrhagia     Obesity (BMI 30-39.9) 2015    Prediabetes 3/14/2018    Status post coronary artery stent placement 2015    LCx stent [id number]      Surgical History:   Lizet Palencia  has a past surgical history that includes  section, classic; right arm surgery (); D&C Hysteroscopy; Coronary stent placement; and Tubal ligation.    Medications:   Lizet has a current medication list which includes the following  "prescription(s): amlodipine, aspirin, atorvastatin, carvedilol, cetirizine, chlorthalidone, ezetimibe, fluticasone propionate, fluticasone propionate, losartan, potassium chloride, and triamcinolone acetonide.    Allergies:   Review of patient's allergies indicates:   Allergen Reactions    Crab Rash    Shrimp Rash      Date of onset: ~2 months  History of current condition - Lizet reports: Chief complaint of L Lateral hip pain, not having low back currently. 2 months ago pain flared up - no mechanism of injury. Pain from L lateral buttocks into L lateral knee. Has had chronic L lateral thigh - would feel mostly when waking up in morning for past ~10 years. Can feel L knee buckling when increased tension at lateral thigh.  Denies paresthesia. No previous surgeries or procedures. Typically sleeps on R LE, but feels increased pain when sleeping/waking from sleeping    Per MD Note   "51-year-old patient of mine, seen by me for annual physical 04/28/2021 and with chronic conditions that include CAD w/ h/o stent/NSTEMI, HTN, prediabetes(A1c 5.9 7/27/20), HLD, Obesity, who presents today for urgent care visit to discuss worsening left-sided low back pain that is now radiating into her leg down to her knee.  She has had this pain intermittently off and on for a long time but it is becoming more constant and bothersome on a regular basis.     She only has pain on the left side.  It is often focused in the left buttock region but she also has some left low back and mid back pain as well.  She has pain that radiates intermittently into her right thigh down to her knee.  She does not have radiation beyond her knee.  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 " "required  Prior Level of Function: indep  DME owned/used: none  Current Level of Function: indep    Pain:  Current 0/10, worst 6/10, best 0/10   Location: Left Lumbar and L LE  Description: aching, tightness  Aggravating Factors: L Side-lying, fast walking  Easing Factors: massage, decrease activity speed, gently movement    Pts goals: decrease pain significantly, keep pain away, continue walking for exercise     Objective     Posture Alignment: Genu Recurvatum  Palpation: mild TTP L Lateral Hip at Greater Trochanter and IT band    LUMBAR SPINE AROM:    Left Right   Flexion: 128 135   Extension: -5 hyper -5 hyper     LOWER EXTREMITY STRENGTH:   Left Right   Quadriceps 4+/5 5/5   Hamstrings 4+/5 5/5     Iliopsoas 4+/5 5/5   Glute Med 3+/5 3+/5   Hip IR 4-/5 4/5   Hip ER 4/5 4/5   Hip Ext 3+/5 3+/5     Flexibility: Decreased hip flexor/ITB    Special Tests:   Left Right   SLR Negative Negative   Crossed SLR Negative Negative   Sacral Thrust/Compression (prone) Negative Negative   MARTINA Positive - lateral L Hip p! Negative     GAIT: Lizet ambulates with no assistive device with independently.   GAIT DEVIATIONS: Lizet displays mild decrease in stance phase on L LE, decreased stride length and gait speed    Pt/family was provided educational information, including: role of PT, goals for PT, scheduling - pt verbalized understanding. Discussed insurance limitations with pt.     TREATMENT     Treatment Time In: 1015  Treatment Time Out: 1030  Total Treatment time separate from Evaluation: 15 minutes    Lizet received therapeutic exercises to develop strength, endurance, ROM, flexibility, posture and core stabilization for 15 minutes including:    Supine L IT Band Str c/ strap 3x30"  Bridges RTB 10x  SL Clam RTB 15x ea  Next session: core/glute, hip mobility      Home Exercises and Patient Education Provided    Education provided:   Written Home Exercises Provided: yes.  Exercises were reviewed and Lizet was able to " demonstrate them prior to the end of the session.  Lizet demonstrated good  understanding of the education provided.     See EMR under Patient Instructions for exercises provided 1/25/2022.    Assessment     Lizet is a 51 y.o. female referred to outpatient Physical Therapy with a medical diagnosis of  It band syndrome, left. with signs and symptoms including: increased L hip pain, decreased lumbar ROM, decreased LE Strength, soft tissue dysfunction, postural imbalance,impaired joint mobility, and decreased tolerance to functional activities. Pt with pain primarily noted at lateral L hip near Trochanteric Bursa and IT band, no symptoms noted at lumbar spine, SIJ, or Sciatic N distribution. Poor glute strength bilaterally, contributing to muscular imbalances. Good L knee ROM maintained, no pain at either end-range. Signs/symptoms consistent with L Trochanteric Bursitis and IT Band dysfunction, related to muscular imbalances. Pt with good motivation to perform physical activity and responds well to cueing.    Pt prognosis is Good.   Pt will benefit from skilled outpatient Physical Therapy to address the deficits stated above and in the chart below, provide pt/family education, and to maximize pt's level of independence.     Plan of care discussed with patient: Yes  Pt's spiritual, cultural and educational needs considered and patient is agreeable to the plan of care and goals as stated below:     Anticipated Barriers for therapy: COVID-19 Concerns    Medical Necessity is demonstrated by the following  History  Co-morbidities and personal factors that may impact the plan of care Co-morbidities:   Chronic issue for > 10 years, HTN, s/p Coronary Artery Stent Placement    Personal Factors:   BMI     moderate   Examination  Body Structures and Functions, activity limitations and participation restrictions that may impact the plan of care Body Regions:   back  lower extremities  trunk    Body Systems:    gross  symmetry  ROM  strength  gross coordinated movement  balance  gait  transfers  transitions  motor control  motor learning    Participation Restrictions:   Exercise, Family Activity    Activity limitations:   Learning and applying knowledge  no deficits    General Tasks and Commands  no deficits    Communication  no deficits    Mobility  lifting and carrying objects  walking  driving (bike, car, motorcycle)    Self care  looking after one's health    Domestic Life  shopping  cooking  doing house work (cleaning house, washing dishes, laundry)  assisting others    Interactions/Relationships  No deficits    Life Areas  No deficits    Community and Social Life  No deficits         moderate   Clinical Presentation stable and uncomplicated low   Decision Making/ Complexity Score: low     Pt's spiritual, cultural and educational needs considered and pt agreeable to plan of care and goals as stated below:     Short Term GOALS: 4 weeks. Pt agrees with goals set.  1. Patient demonstrates independence with HEP.   2. Patient demonstrates independence with Postural Awareness.   3. Patient demonstrates independence with body mechanics.   4. Patient will report pain of 4/10 at worst, on 0-10 pain scale, with all activity  5. Patient demonstrates ability to walk for 1/2 mile, proper gait pattern, no pain provocation  6. Patient demonstrates increased strength BLE's to 4/5 or greater to improve tolerance to functional activities pain free.    Long Term GOALS: 8 weeks. Pt agrees with goals set.  1. Patient demonstrates ability to climb 2 flights of stairs, reciprocal step pattern, no pain provocation  2. Patient demonstrates increased strength BLE's to 4+/5 or greater to improve tolerance to functional activities pain free.   3. Patient demonstrates ability to perform 30 minutes exercise class, moderate intensity, no pain provocation  4. Patient will report pain of 2/10 at worst, on 0-10 pain scale, with all activity  5. Patient  demonstrates ability to walk 1 mile, proper gait pattern, no pain provocation    PLAN     Plan of care Certification: 1/25/2022 to 3/25/22.    Outpatient Physical Therapy 2 times weekly for 8 weeks to include the following interventions: Electrical Stimulation IFC/NMES, Gait Training, Manual Therapy, Moist Heat/ Ice, Neuromuscular Re-ed, Patient Education, Self Care, Therapeutic Activities, Therapeutic Exercise and Dry Needling.  Pt may be seen by PTA as part of the rehabilitation team.     Pipo Mendez, PT  1/25/2022    I have seen the patient, reviewed the therapist's plan of care, and I agree with the plan of care.      I certify the need for these services furnished under this plan of treatment and while under my care.     ___________________ ________ Physician/Referring Practitioner            ___________________________ Date of Signature

## 2022-01-25 NOTE — PROGRESS NOTES
"  Please See Full Physical Therapy Evaluation in Plan of Care                                                        Physical Therapy Initial Evaluation     Name: Lizet Palencia  [facility] Number: [id number]    Therapy Diagnosis:   Encounter Diagnoses   Name Primary?    Acute left-sided low back pain with left-sided sciatica     Sacroiliac joint dysfunction of left side     It band syndrome, left     Lateral pain of hip     Decreased strength of lower extremity      Physician: Marta Louis MD    Physician Orders: PT Eval and Treat   Medical Diagnosis from Referral:   M54.42 (ICD-10-CM) - Acute left-sided low back pain with left-sided sciatica   M53.3 (ICD-10-CM) - Sacroiliac joint dysfunction of left side   M76.32 (ICD-10-CM) - It band syndrome, left     Evaluation Date: 1/25/2022  Authorization Period Expiration: 1/24/23  Plan of Care Expiration: 4/25/22  Visit # / Visits authorized: 1/ pending    Time In: 945  Time Out: 1030  Total Billable Time: 45 minutes    Precautions: Standard    TREATMENT     Treatment Time In: 1015  Treatment Time Out: 1030  Total Treatment time separate from Evaluation: 15 minutes    Lizet received therapeutic exercises to develop strength, endurance, ROM, flexibility, posture and core stabilization for 15 minutes including:    Supine L IT Band Str c/ strap 3x30"  Bridges RTB 10x  SL Clam RTB 15x ea  Next session: core/glute, hip mobility    Home Exercises and Patient Education Provided    Education provided:   Written Home Exercises Provided: yes.  Exercises were reviewed and Lizet was able to demonstrate them prior to the end of the session.  Lizet demonstrated good  understanding of the education provided.     See EMR under Patient Instructions for exercises provided 1/25/2022.    Assessment     Pt's spiritual, cultural and educational needs considered and pt agreeable to plan of care and goals as stated below:     Short Term GOALS: 4 weeks. Pt agrees with goals set.  1. " Patient demonstrates independence with HEP.   2. Patient demonstrates independence with Postural Awareness.   3. Patient demonstrates independence with body mechanics.   4. Patient will report pain of 4/10 at worst, on 0-10 pain scale, with all activity  5. Patient demonstrates ability to walk for 1/2 mile, proper gait pattern, no pain provocation  6. Patient demonstrates increased strength BLE's to 4/5 or greater to improve tolerance to functional activities pain free.    Long Term GOALS: 8 weeks. Pt agrees with goals set.  1. Patient demonstrates ability to climb 2 flights of stairs, reciprocal step pattern, no pain provocation  2. Patient demonstrates increased strength BLE's to 4+/5 or greater to improve tolerance to functional activities pain free.   3. Patient demonstrates ability to perform 30 minutes exercise class, moderate intensity, no pain provocation  4. Patient will report pain of 2/10 at worst, on 0-10 pain scale, with all activity  5. Patient demonstrates ability to walk 1 mile, proper gait pattern, no pain provocation    PLAN     Plan of care Certification: 1/25/2022 to 3/25/22.    Outpatient Physical Therapy 2 times weekly for 8 weeks to include the following interventions: Electrical Stimulation IFC/NMES, Gait Training, Manual Therapy, Moist Heat/ Ice, Neuromuscular Re-ed, Patient Education, Self Care, Therapeutic Activities, Therapeutic Exercise and Dry Needling.  Pt may be seen by PTA as part of the rehabilitation team.     Pipo Mendez, PT  1/25/2022    I have seen the patient, reviewed the therapist's plan of care, and I agree with the plan of care.      I certify the need for these services furnished under this plan of treatment and while under my care.     ___________________ ________ Physician/Referring Practitioner            ___________________________ Date of Signature

## 2022-01-26 ENCOUNTER — TELEPHONE (OUTPATIENT)
Dept: CARDIOLOGY | Facility: CLINIC | Age: 52
End: 2022-01-26
Payer: OTHER GOVERNMENT

## 2022-01-26 ENCOUNTER — HOSPITAL ENCOUNTER (EMERGENCY)
Facility: HOSPITAL | Age: 52
Discharge: HOME | End: 2022-01-26
Attending: EMERGENCY MEDICINE
Payer: OTHER GOVERNMENT

## 2022-01-26 VITALS
BODY MASS INDEX: 33.03 KG/M2 | OXYGEN SATURATION: 100 % | TEMPERATURE: 99 F | HEIGHT: 69 IN | DIASTOLIC BLOOD PRESSURE: 89 MMHG | HEART RATE: 61 BPM | RESPIRATION RATE: 17 BRPM | SYSTOLIC BLOOD PRESSURE: 139 MMHG | WEIGHT: 223 LBS

## 2022-01-26 DIAGNOSIS — R07.9 CHEST PAIN: ICD-10-CM

## 2022-01-26 DIAGNOSIS — R07.89 ATYPICAL CHEST PAIN: Primary | ICD-10-CM

## 2022-01-26 LAB
ALBUMIN SERPL BCP-MCNC: 4 G/DL (ref 3.5–5.2)
ALP SERPL-CCNC: 46 U/L (ref 55–135)
ALT SERPL W/O P-5'-P-CCNC: 18 U/L (ref 10–44)
ANION GAP SERPL CALC-SCNC: 9 MMOL/L (ref 8–16)
AST SERPL-CCNC: 20 U/L (ref 10–40)
BASOPHILS # BLD AUTO: 0.06 K/UL (ref 0–0.2)
BASOPHILS NFR BLD: 1.2 % (ref 0–1.9)
BILIRUB SERPL-MCNC: 0.6 MG/DL (ref 0.1–1)
BNP SERPL-MCNC: 15 PG/ML (ref 0–99)
BUN SERPL-MCNC: 15 MG/DL (ref 6–20)
CALCIUM SERPL-MCNC: 9.7 MG/DL (ref 8.7–10.5)
CHLORIDE SERPL-SCNC: 106 MMOL/L (ref 95–110)
CO2 SERPL-SCNC: 25 MMOL/L (ref 23–29)
CREAT SERPL-MCNC: 0.9 MG/DL (ref 0.5–1.4)
DIFFERENTIAL METHOD: ABNORMAL
EOSINOPHIL # BLD AUTO: 0.1 K/UL (ref 0–0.5)
EOSINOPHIL NFR BLD: 1.2 % (ref 0–8)
ERYTHROCYTE [DISTWIDTH] IN BLOOD BY AUTOMATED COUNT: 12.8 % (ref 11.5–14.5)
EST. GFR  (AFRICAN AMERICAN): >60 ML/MIN/1.73 M^2
EST. GFR  (NON AFRICAN AMERICAN): >60 ML/MIN/1.73 M^2
GLUCOSE SERPL-MCNC: 91 MG/DL (ref 70–110)
HCT VFR BLD AUTO: 42.1 % (ref 37–48.5)
HGB BLD-MCNC: 13.8 G/DL (ref 12–16)
IMM GRANULOCYTES # BLD AUTO: 0.01 K/UL (ref 0–0.04)
IMM GRANULOCYTES NFR BLD AUTO: 0.2 % (ref 0–0.5)
LYMPHOCYTES # BLD AUTO: 2.5 K/UL (ref 1–4.8)
LYMPHOCYTES NFR BLD: 50.8 % (ref 18–48)
MCH RBC QN AUTO: 31 PG (ref 27–31)
MCHC RBC AUTO-ENTMCNC: 32.8 G/DL (ref 32–36)
MCV RBC AUTO: 95 FL (ref 82–98)
MONOCYTES # BLD AUTO: 0.6 K/UL (ref 0.3–1)
MONOCYTES NFR BLD: 11.4 % (ref 4–15)
NEUTROPHILS # BLD AUTO: 1.7 K/UL (ref 1.8–7.7)
NEUTROPHILS NFR BLD: 35.2 % (ref 38–73)
NRBC BLD-RTO: 0 /100 WBC
PLATELET # BLD AUTO: 203 K/UL (ref 150–450)
PMV BLD AUTO: 10.8 FL (ref 9.2–12.9)
POTASSIUM SERPL-SCNC: 4 MMOL/L (ref 3.5–5.1)
PROT SERPL-MCNC: 7.4 G/DL (ref 6–8.4)
RBC # BLD AUTO: 4.45 M/UL (ref 4–5.4)
SODIUM SERPL-SCNC: 140 MMOL/L (ref 136–145)
TROPONIN I SERPL DL<=0.01 NG/ML-MCNC: <0.006 NG/ML (ref 0–0.03)
TROPONIN I SERPL DL<=0.01 NG/ML-MCNC: <0.006 NG/ML (ref 0–0.03)
WBC # BLD AUTO: 4.84 K/UL (ref 3.9–12.7)

## 2022-01-26 PROCEDURE — 83880 ASSAY OF NATRIURETIC PEPTIDE: CPT | Performed by: EMERGENCY MEDICINE

## 2022-01-26 PROCEDURE — 93010 EKG 12-LEAD: ICD-10-PCS | Mod: ,,, | Performed by: INTERNAL MEDICINE

## 2022-01-26 PROCEDURE — 80053 COMPREHEN METABOLIC PANEL: CPT | Performed by: EMERGENCY MEDICINE

## 2022-01-26 PROCEDURE — 84484 ASSAY OF TROPONIN QUANT: CPT | Performed by: EMERGENCY MEDICINE

## 2022-01-26 PROCEDURE — 85025 COMPLETE CBC W/AUTO DIFF WBC: CPT | Performed by: EMERGENCY MEDICINE

## 2022-01-26 PROCEDURE — 99285 EMERGENCY DEPT VISIT HI MDM: CPT | Mod: 25

## 2022-01-26 PROCEDURE — 93005 ELECTROCARDIOGRAM TRACING: CPT

## 2022-01-26 PROCEDURE — 93010 ELECTROCARDIOGRAM REPORT: CPT | Mod: ,,, | Performed by: INTERNAL MEDICINE

## 2022-01-26 PROCEDURE — 25000003 PHARM REV CODE 250: Performed by: EMERGENCY MEDICINE

## 2022-01-26 RX ORDER — OMEPRAZOLE 20 MG/1
20 CAPSULE, DELAYED RELEASE ORAL DAILY
Qty: 14 CAPSULE | Refills: 0 | Status: SHIPPED | OUTPATIENT
Start: 2022-01-26 | End: 2023-04-24 | Stop reason: ALTCHOICE

## 2022-01-26 RX ORDER — ASPIRIN 325 MG
325 TABLET ORAL
Status: COMPLETED | OUTPATIENT
Start: 2022-01-26 | End: 2022-01-26

## 2022-01-26 RX ADMIN — ASPIRIN 325 MG ORAL TABLET 325 MG: 325 PILL ORAL at 07:01

## 2022-01-26 NOTE — TELEPHONE ENCOUNTER
Patient called to see if there were any cancellations today with Dr. Cox.  She was notified that there were not.  She expressed understanding.

## 2022-01-26 NOTE — TELEPHONE ENCOUNTER
----- Message from Siria Mitchell sent at 1/26/2022 11:10 AM CST -----  Contact: [phone]  Who Called: PT  Regarding: questions about chest pain    Would the patient rather a call back or a response via [facility]? Call back  Best Call Back Number: [phone]  Additional Information:

## 2022-01-27 NOTE — ED PROVIDER NOTES
SUBJECTIVE:                                                    Ines Mott is a 53 year old female who presents to clinic today for the following health issues:  Health Maintenance Due   Topic Date Due     EYE EXAM Q1 YEAR  01/15/1966     PNEUMOVAX 1X HI RISK PATIENT < 65 (NO IB MSG)  01/15/1967     HEPATITIS C SCREENING  01/15/1983     FIT Q1 YR  07/03/2016     Health Maintenance reviewed at today's visit patient asked to schedule/complete:   Colon Cancer:  Patient agrees to schedule      Chronic Pain Follow-Up       Type / Location of Pain: back  Analgesia/pain control:       Recent changes:  same      Overall control: Tolerable with discomfort  Activity level/function:      Daily activities:  Able to do all daily activities    Work:  not applicable  Adverse effects:  No  Adherance    Taking medication as directed?  Yes    Participating in other treatments: not applicable  Risk Factors:    Sleep:  Poor    Mood/anxiety:  controlled    Recent family or social stressors:  none noted    Other aggravating factors: none  PHQ-9 SCORE 11/4/2016 11/25/2016 4/25/2017   Total Score - - -   Total Score 7 3 5     RASTA-7 SCORE 8/24/2015 11/4/2016   Total Score 7 9     Encounter-Level CSA - 04/18/2017:                 Controlled Substance Agreement - Scan on 4/28/2017  7:00 AM : CONTROLLED SUBSTANCE AGREEMENT (below)          Encounter-Level CSA - 04/18/2017:                 Controlled Substance Agreement - Scan on 1/5/2017  1:14 PM : CONTROLLED SUBSTANCE AGREEMENT (below)          Encounter-Level CSA - 04/18/2017:                 Controlled Substance Agreement - Scan on 12/28/2015  2:53 PM : CONTROLLED MEDICATION CONTRACT, 12-22-15 (below)          Encounter-Level CSA - 04/18/2017:                 Controlled Substance Agreement - Scan on 4/17/2015  1:45 PM : [facility], Controlled Substance Contract, 04-10-15 (below)                 Amount of exercise or physical activity: 6-7 days/week for an average of 30-45  Encounter Date: 1/26/2022       History     Chief Complaint   Patient presents with    Chest Pain     Mid sternal CP that began at 0715 this am, hx of cardiac stent, denies n/v/cough/SOB      Lizet Palencia is a 51 y.o. female who  has a past medical history of Accelerated hypertension (1/1/2015), Allergic rhinitis (1/3/2015), Coronary artery disease (7/2/2016), Coronary artery disease involving native coronary artery of native heart without angina pectoris (7/2/2016), Hepatitis C antibody test positive (5/4/2016), History of non-ST elevation myocardial infarction (NSTEMI) (7/2/2016), Menorrhagia, Obesity (BMI 30-39.9) (1/2/2015), Prediabetes (3/14/2018), and Status post coronary artery stent placement (1/19/2015).    The patient presents to the ED due to chest pain.   Patient reports symptoms started this morning after waking up from sleep. She describes a persistent tightness in her mid sternum. She denies any associated radiation of pain, diaphoresis, exertional symptoms, fever, cough, N/V, abdominal pain, recent illness, or known sick contacts.  She states she ate tomato soup last night, and is concerned she may have acid reflux, as she has had some hiccups after eating recently. She is not currently on any antacid medication.  She does have a history of CAD with stent placement in 2015. She has a cardiology follow-up appointment next week.         Review of patient's allergies indicates:   Allergen Reactions    Crab Rash    Shrimp Rash     Past Medical History:   Diagnosis Date    Accelerated hypertension 1/1/2015    Allergic rhinitis 1/3/2015    Coronary artery disease 7/2/2016    Coronary artery disease involving native coronary artery of native heart without angina pectoris 7/2/2016    Hepatitis C antibody test positive 5/4/2016    Negative HCV RNA 05/2016, will check again in 3 months but no detectable HCV virus HCV AB will likely remain positive for life    History of non-ST elevation  minutes    Problems taking medications regularly: No    Medication side effects: none    Diet: regular (no restrictions)    Concern -  Right wrist pain ove last 2 weeks      Onset: ongoing x 2 weeks     Description:    tingling and pain     Intensity: mild    Progression of Symptoms:  worsening    Accompanying Signs & Symptoms:   tingling and numbness right hand        Previous history of similar problem:    no     Precipitating factors:   Worsened by:  Gripping and grasping     Alleviating factors:  Improved by:  Rest        Therapies Tried and outcome:  NOT APPLICABLE     HISTORY OF SHORTNESS OF BREATH with EXERTION IMPROVING with WEIGHT  LOSS    HISTORY OF VASOACTIVE SLOW GROWING COLON CANCER     OBSTRUCTIVE SLEEP APNEA NEEDS TO GET DEVICE FROM PREVIOUS PROVIDER with SETTINGS    MYELOMENINGOCOEL AND HEADACHES IMPROVED     BMI with 3  POUND WEIGHT LOSS     HISTORY RIGHT RENAL MASS     DEPRESSION AND ALCOHOL ABUSE IMPROVED    DIABETES 2 GOAL 8% WELL CONTROLLED CURRENT REGIMEN    CHRONIC PAIN SYNDROME     LOWER BACK PAIN with RADICULOPATHY, RIGHT UPPER QUADRANT ABDOMINAL PAIN AND HEADACHES     CHRONIC TENSION HEADACHES               Problem list and histories reviewed & adjusted, as indicated.  Additional history: as documented      Patient Active Problem List   Diagnosis     Dyspnea and respiratory abnormality     Other specified drug dependence, in remission     Carcinoma of colon metastatic to liver (H)     Kidney infection     ALEXUS (obstructive sleep apnea)     Myelomeningocele with hydrocephalus, cervical region (H)     Fibromyalgia     Angioedema     Pneumonia due to other Gram-negative bacteria     BMI 42     H/O hysterectomy for benign disease     Hyperlipidemia with target LDL less than 130     Infected tooth     Recurrent major depression in partial remission     Hypertension goal BP (blood pressure) < 140/80     Abdominal pain, right lateral     Renal mass, right     Health Care Home     Intracranial shunt  myocardial infarction (NSTEMI) 2016    Menorrhagia     Obesity (BMI 30-39.9) 2015    Prediabetes 3/14/2018    Status post coronary artery stent placement 2015    LCx stent       Past Surgical History:   Procedure Laterality Date     SECTION, CLASSIC      x3    CORONARY STENT PLACEMENT      D&C Hysteroscopy      right arm surgery      TUBAL LIGATION       Family History   Problem Relation Age of Onset    Hypertension Mother     Diabetes Maternal Aunt     Hypertension Maternal Aunt     Cancer Neg Hx     Heart disease Neg Hx      Social History     Tobacco Use    Smoking status: Never Smoker    Smokeless tobacco: Never Used   Substance Use Topics    Alcohol use: Yes     Comment: social    Drug use: No     Review of Systems   Constitutional: Negative for chills and fever.   HENT: Negative for sore throat.    Respiratory: Negative for cough and shortness of breath.    Cardiovascular: Positive for chest pain.   Gastrointestinal: Negative for abdominal pain, constipation, diarrhea, nausea and vomiting.   Genitourinary: Negative for dysuria, frequency and urgency.   Musculoskeletal: Negative for back pain.   Skin: Negative for rash and wound.   Neurological: Negative for syncope and weakness.   Hematological: Does not bruise/bleed easily.   Psychiatric/Behavioral: Negative for agitation, behavioral problems and confusion.       Physical Exam     Initial Vitals [22 1852]   BP Pulse Resp Temp SpO2   (!) 179/109 69 18 98.3 °F (36.8 °C) 99 %      MAP       --         Physical Exam    Nursing note and vitals reviewed.  Constitutional: She appears well-developed and well-nourished. She is not diaphoretic. No distress.   Well-appearing, pleasant, no distress.   HENT:   Head: Normocephalic and atraumatic.   Mouth/Throat: Oropharynx is clear and moist.   Eyes: EOM are normal. Pupils are equal, round, and reactive to light.   Neck: No tracheal deviation present.      Migraine     Mild intermittent asthma without complication     Other complicated headache syndrome     Seasonal affective disorder (H)     Anxiety     PTSD (post-traumatic stress disorder)     Pre diabetes      Chronic pain syndrome     Chronic tension-type headache, not intractable     Degeneration of lumbosacral intervertebral disc     Comprehensive diabetic foot examination, type 2 DM, encounter for (H)     Past Surgical History:   Procedure Laterality Date     APPENDECTOMY       CHOLECYSTECTOMY       EXTRACTION(S) DENTAL       GYN SURGERY       IMPLANT SHUNT VENTRICULOPERITONEAL         Social History   Substance Use Topics     Smoking status: Former Smoker     Smokeless tobacco: Never Used     Alcohol use No      Comment: off and on     Family History   Problem Relation Age of Onset     CANCER Father          Current Outpatient Prescriptions   Medication Sig Dispense Refill     order for DME Right wrist splint for carpal tunnel syndrome   One*  Use as directed at hour of sleep 1 Device 1     [START ON 6/18/2017] oxyCODONE-acetaminophen (PERCOCET) 5-325 MG per tablet Take 1 tablet by mouth every 4 hours as needed for moderate to severe pain 30 tablet 0     montelukast (SINGULAIR) 10 MG tablet Take 1 tablet (10 mg) by mouth At Bedtime 30 tablet 11     cetirizine (ZYRTEC) 10 MG tablet Take 1 tablet (10 mg) by mouth every evening 30 tablet 1     fluticasone (FLONASE) 50 MCG/ACT spray Spray 1-2 sprays into both nostrils daily 3 Bottle 1     sodium chloride (SALINE MIST) 0.65 % nasal spray Spray 1 spray into both nostrils daily as needed for congestion 1 Bottle 11     triamcinolone (KENALOG) 0.1 % paste Take by mouth 2 times daily 5 g 0     ondansetron (ZOFRAN ODT) 4 MG ODT tab Take 1 tablet (4 mg) by mouth every 8 hours as needed for nausea 10 tablet 0     Vitamin D, Cholecalciferol, 1000 UNITS TABS Take 2,000 Units by mouth daily 60 tablet 11     cyanocobalamin (VITAMIN B12) 1000 MCG/ML injection Inject 1 mL    Cardiovascular: Normal rate, regular rhythm, normal heart sounds and intact distal pulses.   Pulmonary/Chest: Breath sounds normal. No stridor. No respiratory distress. She has no wheezes.   Abdominal: Abdomen is soft. Bowel sounds are normal. She exhibits no distension and no mass. There is no abdominal tenderness.   Musculoskeletal:         General: No edema. Normal range of motion.     Neurological: She is alert and oriented to person, place, and time. She has normal strength. No cranial nerve deficit or sensory deficit.   Skin: Skin is warm and dry. Capillary refill takes less than 2 seconds. No pallor.   Psychiatric: She has a normal mood and affect. Her behavior is normal. Thought content normal.         ED Course   Procedures  Labs Reviewed   CBC W/ AUTO DIFFERENTIAL - Abnormal; Notable for the following components:       Result Value    Gran # (ANC) 1.7 (*)     Gran % 35.2 (*)     Lymph % 50.8 (*)     All other components within normal limits   COMPREHENSIVE METABOLIC PANEL - Abnormal; Notable for the following components:    Alkaline Phosphatase 46 (*)     All other components within normal limits   TROPONIN I   B-TYPE NATRIURETIC PEPTIDE   TROPONIN I   TROPONIN I     EKG Readings: (Independently Interpreted)   Initial Reading: No STEMI. Previous EKG: Compared with most recent EKG Rhythm: Normal Sinus Rhythm.   NSR, rate 64, T-wave inversions inferior and lateral leads, no ST elevations or other signs of ischemia, otherwise normal intervals.  Compared with prior EKG dated 03/2020, grossly stable without significant change.         Imaging Results          X-Ray Chest PA And Lateral (Final result)  Result time 01/26/22 21:20:08    Final result by Robel Bardales DO (01/26/22 21:20:08)                 Impression:      No acute abnormality.      Electronically signed by: Robel Bardales  Date:    01/26/2022  Time:    21:20             Narrative:    EXAMINATION:  XR CHEST PA AND LATERAL    CLINICAL  (1,000 mcg) Subcutaneous every 30 days 1 mL 11     potassium chloride (K-TAB,KLOR-CON) 10 MEQ tablet Take 1 tablet (10 mEq) by mouth daily 120 tablet 11     sertraline (ZOLOFT) 100 MG tablet Take 1 tablet (100 mg) by mouth daily 30 tablet 11     losartan-hydrochlorothiazide (HYZAAR) 50-12.5 MG per tablet Take 1 tablet by mouth daily 90 tablet 3     albuterol (2.5 MG/3ML) 0.083% neb solution Take 1 vial (2.5 mg) by nebulization every 6 hours as needed for shortness of breath / dyspnea or wheezing 30 vial 3     magnesium oxide (MAG-OX) 400 (241.3 MG) MG tablet Take 1 tablet (400 mg) by mouth daily 90 tablet 3     albuterol (VENTOLIN HFA) 108 (90 BASE) MCG/ACT Inhaler INHALE TWO PUFFS BY MOUTH EVERY 4 HOURS AS NEEDED FOR SHORTNESS OF BREATH/DYSPNEA 54 g 1     mometasone-formoterol (DULERA) 100-5 MCG/ACT oral inhaler Inhale 2 puffs into the lungs 2 times daily 1 Inhaler 11     blood glucose monitoring (NO BRAND SPECIFIED) meter device kit Use to test blood sugar larisa times daily or as directed. 1 kit 0     blood glucose (NO BRAND SPECIFIED) lancets standard Use to test blood sugar daily times daily or as directed. 1 Box 11     blood glucose monitoring (NO BRAND SPECIFIED) test strip Use to test blood sugars once times daily or as directed 100 strip 3     blood glucose calibration (NO BRAND SPECIFIED) solution Use to calibrate blood glucose monitor as directed. 1 each 0     polyethylene glycol (MIRALAX) powder Take 17 g (1 capful) by mouth daily 510 g 3     STATIN NOT PRESCRIBED, INTENTIONAL, 1 each daily Statin not prescribed intentionally due to Active liver disease (liver failure, cirrhosis, hepatitis)  LIVER METS 0 each 0     EPINEPHrine (EPIPEN) 0.3 MG/0.3ML injection Inject 0.3 mLs (0.3 mg) into the muscle once as needed for anaphylaxis 2 each 5     Allergies   Allergen Reactions     Ativan [Lorazepam] Anaphylaxis     Macrobid [Nitrofurantoin] Anaphylaxis     Amoxicillin      Buspirone      Buspar      HISTORY:  Chest pain, unspecified    TECHNIQUE:  PA and lateral views of the chest were performed.    COMPARISON:  05/08/2020.    FINDINGS:  The lungs are well expanded and clear. No focal opacities are seen. The pleural spaces are clear.    The cardiac silhouette is unremarkable.    The visualized osseous structures are unremarkable.                                 Medications   aspirin tablet 325 mg (325 mg Oral Given 1/26/22 1934)     Medical Decision Making:   History:   Old Medical Records: I decided to obtain old medical records.  Old Records Summarized: records from clinic visits and other records.       <> Summary of Records: History of CAD s/p PCI LCX to NSTEMI in 2015 with 3.5 x 15 resolute ALDA, LHC in 2016 showed patent stent  Seen by Cardiology 5/6/21, ASA, statin continued, Zetia added.   Initial Assessment:   52 yo F with CAD s/p PCI 2015 due to NSTEMI presents to ED with CP starting this AM.  Will obtain cardiac eval, labs, CXR, reassess.  Differential Diagnosis:   Differential Diagnosis includes, but is not limited to:  ACS/MI, PE, aortic dissection, pneumothorax, cardiac tamponade, pericarditis/myocarditis, pneumonia, infection/abscess, lung mass, trauma/fracture, costochondritis/pleurisy, MSK pain/contusion, GERD, biliary disease, pancreatitis, anemia    Clinical Tests:   Lab Tests: Ordered and Reviewed  Radiological Study: Reviewed and Ordered  Medical Tests: Ordered and Reviewed  ED Management:  EKG without new ischemic changes, stable from prior.  CXR clear, no infiltrate or other acute process.  Labs unremarkable, delta troponin negative.  Patient informed of findings and reassured, symptoms atypical for cardiac nature.  Will treat empirically for possible component of GERD, Rx omeprazole.  Patient to follow-up with her cardiologist as scheduled, return to ER for worsening symptoms or any other concerns.    On re-evaluation, the patient's status has improved.  After complete ED evaluation,  Cephalosporins      Dilaudid [Hydromorphone]      Diphenhydramine Hcl      Benadryl     Imitrex [Sumatriptan Succinate]      blood pressure raised uncontrobably     Ketorolac Tromethamine      Toradol     Lansoprazole      Prevacid     Metoclopramide Hives     Reglan     Paroxetine      Paxil     Penicillins      Prednisone Hives     Sulfa Drugs      THICKENED AND DIFFICULTY SWALLOWING      Lidoderm [Lidocaine] Rash     Localized rash at patch site     Recent Labs   Lab Test  04/03/17   1004  01/26/17   1803  01/18/17   1716  01/07/17   0120   11/04/16   1407   04/07/16   1620   05/07/15   1222   10/21/14   0230   A1C  5.9   --    --    --    --   6.1*   --   5.7   --   6.0   < >   --    LDL   --    --    --    --    --   96   --   101*   --   127   --    --    HDL   --    --    --    --    --   42*   --   42*   --   36*   --    --    TRIG   --    --    --    --    --   161*   --   306*   --   177*   --    --    ALT   --   25  17  41   < >  27   < >  30   < >  24   < >  31   CR   --   0.86  0.66  0.75   < >  1.22*   < >   --    < >   --    < >  0.82   GFRESTIMATED   --   69  >90  Non  GFR Calc    81   < >  46*   < >   --    < >   --    < >  73   GFRESTBLACK   --   84  >90   GFR Calc    >90   GFR Calc     < >  56*   < >   --    < >   --    < >  89   POTASSIUM   --   3.5  3.1*  3.7   < >  3.8   < >   --    < >   --    < >  3.8   TSH  1.62   --    --    --    --    --    --    --    --    --    --   2.30    < > = values in this interval not displayed.      BP Readings from Last 3 Encounters:   06/09/17 126/72   06/01/17 128/78   05/22/17 126/78    Wt Readings from Last 3 Encounters:   06/09/17 263 lb (119.3 kg)   06/01/17 265 lb 8 oz (120.4 kg)   05/22/17 259 lb (117.5 kg)                  Labs reviewed in EPIC    Reviewed and updated as needed this visit by clinical staff  Tobacco  Allergies  Med Hx  Surg Hx  Fam Hx  Soc Hx      Reviewed and updated as needed this  clinical impression is most consistent with atypical chest pain.  PCP/Cardiology follow-up within 2-3 days was recommended.    After taking into careful account the patient's history, physical exam findings, as well as empirical and objective data obtained throughout ED workup, I feel no emergent medical condition has been identified. No further evaluation or admission was felt to be required, and the patient is stable for discharge from the ED. The patient and any additional family present were updated with test results, overall clinical impression, and recommended further plan of care, including discharge instructions as provided and outpatient follow-up for continued evaluation and management as needed. All questions were answered. The patient expressed understanding and agreed with current plan for discharge and follow-up plan of care. Strict ED return precautions were provided, including return/worsening of current symptoms, new symptoms, or any other concerns.      Additional MDM:   Heart Score:    History:          Slightly suspicious.  ECG:             Nonspecific repolarisation disturbance  Age:               45-65 years  Risk factors: >= 3 risk factors or history of atherosclerotic disease  Troponin:       Less than or equal to normal limit  Final Score: 4              ED Course as of 01/26/22 2333   Wed Jan 26, 2022   1858 EKG interpretation:  No STEMI, T-wave inversions inferior and lateral leads without ST elevations, appear stable from prior.  Cardiac workup ordered from triage. [SS]   6423 Pain and blood pressure improved spontaneously without intervention in the ED.  Labs unremarkable, initial troponin negative.  Will obtain delta troponin reassess. [SS]      ED Course User Index  [SS] Bandar River MD             Clinical Impression:   Final diagnoses:  [R07.9] Chest pain  [R07.89] Atypical chest pain (Primary)            ED Disposition Condition    Discharge Stable        ED Prescriptions      Medication Sig Dispense Start Date End Date Auth. Provider    omeprazole (PRILOSEC) 20 MG capsule Take 1 capsule (20 mg total) by mouth once daily. for 14 days 14 capsule 1/26/2022 2/9/2022 Bandar River MD        Follow-up Information     Follow up With Specialties Details Why Contact Info    Marta Louis MD Family Medicine Schedule an appointment as soon as possible for a visit   [address]  [address]  [facility] [address]  [phone]      Lasha Cox MD Cardiology, Interventional Cardiology   [address]  [address]  [place] [address]  [phone]             Bandar Rievr MD  01/26/22 7789     visit by Provider         ROS: has Dyspnea and respiratory abnormality; Other specified drug dependence, in remission; Carcinoma of colon metastatic to liver (H); Kidney infection; ALEXUS (obstructive sleep apnea); Myelomeningocele with hydrocephalus, cervical region (H); Fibromyalgia; Angioedema; Pneumonia due to other Gram-negative bacteria; BMI 42; H/O hysterectomy for benign disease; Hyperlipidemia with target LDL less than 130; Infected tooth; Recurrent major depression in partial remission; Hypertension goal BP (blood pressure) < 140/80; Abdominal pain, right lateral; Renal mass, right; Health Care Home; Intracranial shunt; Migraine; Mild intermittent asthma without complication; Other complicated headache syndrome; Seasonal affective disorder (H); Anxiety; PTSD (post-traumatic stress disorder); Pre diabetes ; Chronic pain syndrome; Chronic tension-type headache, not intractable; Degeneration of lumbosacral intervertebral disc; and Comprehensive diabetic foot examination, type 2 DM, encounter for (H) on her problem list  Review Of Systems  Skin: negative  Eyes: negative  Ears/Nose/Throat: negative  Respiratory: No shortness of breath, dyspnea on exertion, cough, or hemoptysis and  HISTORY OF ASTHMA QUIESCENT  Cardiovascular: negative for, palpitations, tachycardia, irregular heart beat and chest pain  Gastrointestinal: negative for, poor appetite, dysphagia, nausea, vomiting, heartburn, dyspepsia and reflux  HISTORY OF RIGHT UPPER QUADRANT CANCER SLOW GROWING   Genitourinary: negative  Musculoskeletal:  CHRONIC LOWER BACK PAIN AND RADICULOPATHY   Neurologic: negative  Psychiatric:  ANXIETY AND DEPRESSION  Hematologic/Lymphatic/Immunologic: negative  Endocrine: negative      OBJECTIVE:                                                    /72  Pulse 73  Temp 97.2  F (36.2  C) (Tympanic)  Resp 20  Wt 263 lb (119.3 kg)  SpO2 98%  BMI 42.45 kg/m2  Body mass index is 42.45 kg/(m^2).  GENERAL: healthy, alert  "and no distress  NECK: no adenopathy, no asymmetry, masses, or scars and thyroid normal to palpation  RESP: lungs clear to auscultation - no rales, rhonchi or wheezes  CV: regular rate and rhythm, normal S1 S2, no S3 or S4, no murmur, click or rub, no peripheral edema and peripheral pulses strong  ABDOMEN: soft, nontender, no hepatosplenomegaly, no masses and bowel sounds normal  MS: no gross musculoskeletal defects noted, no edema  SKIN: no suspicious lesions or rashes  NEURO: Normal strength and tone, mentation intact and speech normal  BACK: no CVA tenderness, no paralumbar tenderness    Diagnostic Test Results:  Results for orders placed or performed in visit on 04/03/17   Hemoglobin A1c   Result Value Ref Range    Hemoglobin A1C 5.9 4.3 - 6.0 %   TSH   Result Value Ref Range    TSH 1.62 0.40 - 4.00 mU/L        ASSESSMENT/PLAN:                                                          ICD-10-CM    1. Carpal tunnel syndrome of right wrist G56.01 order for DME   2. Chronic pain syndrome G89.4 oxyCODONE-acetaminophen (PERCOCET) 5-325 MG per tablet     DISCONTINUED: oxyCODONE-acetaminophen (PERCOCET) 5-325 MG per tablet       Patient Instructions   Diabetes: Exchange List  What are the exchange lists?   The exchange lists show you portions of food that equal 1 exchange. Foods are divided into food lists. The foods on each list are called exchanges because they have a similar number of calories, protein, carbohydrate and fat content. Foods from each list can be traded or \"exchanged\" for any other food on the same list because they all have a similar exchange value. A dietitian will help you plan how much food your child should eat at each meal and from what lists the foods should come from. At first you should measure your food until you are able to make good estimates about serving sizes. The following list is a sample of foods found on the exchange lists. For more information, you can buy the Exchange Lists for Meal " Planning from: The [employer] [address] [place] [address] [phone] http://www.diabetes.org  Carbohydrate group   Starch List: One starch exchange contains about 15 grams of carbohydrate, 3 grams of protein, 0 to 1 grams of fat, and 80 calories. A starch exchange is sometimes called a carb exchange. Examples of one starch (carb) exchange are:   one slice of bread   1/2 hamburger or hot dog bun   3/4 cup of unsweetened cereal   1/3 cup pasta   3 cups popcorn   crackers (6 small saltines, 3 squares of vilma crackers, 3 of most other crackers)   1 pancake or waffle (5 inch)   15 to 20 fat-free or baked potato or corn chips.   The vegetables included in the starch exchanges include:   corn (1/2 cup or 1/2 cob)   white potato (1/4 large baked with skin or 1/2 cup mashed)   yam or sweet potato (1/2 cup)   green peas (1/2 cup)   squash, winter (1 cup)   lima beans (2/3 cup).   Fruit List: 1 fruit exchange contains about 15 grams of carbohydrate and 60 calories. Examples of one fruit exchange are:   grape juice (1/3 cup)   apple or pineapple juice (1/2 cup)   orange or grapefruit juice (1/2 cup)   1 small apple   orange or peach   1/2 banana   1 cup raspberries   1/3 of a small cantaloupe   1 slice of watermelon.   Milk List: 1 milk exchange contains about 8 grams of protein and 12 grams of carbohydrate. Items on the milk list are divided into fat-free, reduced fat, and whole milk depending on the number of fat grams in the exchange. Examples of one milk exchange are: Fat-Free (0 to 3 grams of fat)   1 cup of skim or non-fat milk   1 cup of 1% milk (also includes 1/2 fat exchange)   6 ounces flavored fat-free yogurt   Reduced-Fat (5 grams of fat)   6 ounces of plain, low-fat yogurt   1 cup 2% milk (also includes one fat exchange).   Whole Milk (8 grams of fat)   8 ounces of plain yogurt (made from whole milk)   1 cup whole milk.   Vegetable List: One vegetable exchange has 5 grams of  "carbohydrate, 2 grams of protein, no fat, and 25 calories. One-half cup of cooked or a cup of raw vegetables is a good measure for 1 exchange of most vegetables. Raw lettuce may be taken in larger quantities, but salad dressing usually equals 1 fat exchange. Other Carbohydrates List: One \"other carbohydrate\" exchange has 15 grams of carbohydrate. Many of these foods count as a starch exchange and one or more fat exchanges.   brownie (2 inch square) = 1 carb, 1 fat exchange   fruit snack roll = 1 carb exchange   granola bar = 1 and 1/2 carb exchanges   ice cream (1/2 cup) = 1 carb, 2 fat exchanges   frozen yogurt (1/2 cup) = 1 carb, 0 to 1 fat exchanges   tortilla chips (6-12) = 1 carb, 2 fat exchanges.   Meat and Meat Substitute Group   Meats are divided into very lean meats, lean meats, medium-fat meats, and high-fat meats. People with diabetes should try to eat more lean and medium fat meats and stay away from the high fat choices. The Very Lean meat group includes foods that contain 7 grams of protein, 0 to 1 gram of fat, and 35 calories for 1 exchange. Examples include:   1 ounce chicken or turkey (white meat, no skin)   1 ounce fresh fish   1 ounce fat-free cheese   2 egg whites   The Lean meat group includes foods that contain 7 grams of protein, 3 grams of fat, and 55 calories for 1 meat exchange. Examples include:   1 ounce chicken or turkey (dark meat, no skin)   1 ounce fish   1 ounce lean pork   1 ounce USDA Select or Choice grades of lean beef   1 ounce cheese (with 3 grams of fat or less per ounce).   The Medium-Fat group includes foods that have 7 grams of protein, 5 grams of fat, and 75 calories for 1 meat exchange. Examples include:   1 ounce of ground beef, most cuts of beef, pork, lamb or veal   1 ounce of cheese (5 grams of fat per ounce or less)   1 egg   1 ounce fried fish.   The High-Fat foods have 7 grams of protein, 8 grams of fat, and 100 calories for 1 meat exchange. This group includes: "   1 ounce of pork sausage   1 ounce of spare ribs   1 oz of regular cheese (American, Swiss etc.)   1 oz of lunch meat   1 hot dog (turkey or chicken).   Fat Group   Fat List: Fat is necessary for the body and is particularly important during periods of fasting (overnight), when it is very slowly absorbed. 1 fat exchange contains 5 grams of fat and 45 calories. The monounsaturated fats and polyunsaturated fats are better for us than saturated fats. The fat list includes: 1 exchange of monounsaturated fats equals:   1/2 Tbsp peanut butter   6 almonds   1 tsp of oil (olive, peanut, canola).   1 exchange of polyunsaturated fats equals:   1 tsp margarine   1 tsp of any vegetable oil (except coconut).   1 exchange of saturated fat includes:   1 tsp butter   1 strip of zaidi   2 Tbsp of cream (half and half).   Free Foods   A free food contains less than 20 calories or less than 5 grams of carbohydrate per serving. If the food has a serving size listed on its package, it should be limited to 3 servings spread throughout the day. Examples of free foods include:   4 Tbsp fat-free margarine   1 Tbsp fat-free Miracle Whip   sugar-free gelatin   diet soft drinks   catsup   soy sauce   spices.   Combination Foods   Many foods, such as casseroles, are mixed together. Your dietitian can help you figure out how many exchanges to count for combination foods. For example:   lasagna (1 cup) = 2 carb exchanges and 2 medium-fat meat exchanges   spaghetti with meatballs (1 cup) = 2 carb exchanges and 2 medium-fat meat exchanges   pizza, cheese (1/4 of 12 in.) = 2 carbs, 2 medium-fat meats   chicken noodle soup (1 cup) = 1 carb exchange   frozen entrée (less than 300 calories) = 2 carbs, 3 lean meat exchanges   macaroni and cheese (1 cup) = 2 carb exchanges and 2 medium-fat meat exchanges.             OTTO YUSUF MD  [facility]

## 2022-01-27 NOTE — FIRST PROVIDER EVALUATION
Emergency Department TeleTriage Encounter Note      CHIEF COMPLAINT    Chief Complaint   Patient presents with    Chest Pain     Mid sternal CP that began at 0715 this am, hx of cardiac stent, denies n/v/cough/SOB        VITAL SIGNS   Initial Vitals [01/26/22 1852]   BP Pulse Resp Temp SpO2   (!) 179/109 69 18 98.3 °F (36.8 °C) 99 %      MAP       --            ALLERGIES    Review of patient's allergies indicates:   Allergen Reactions    Crab Rash    Shrimp Rash       PROVIDER TRIAGE NOTE  This is a teletriage evaluation of a 51 y.o. female presenting to the ED complaining of chest pain. Patient reports mid sternal chest pain that started this morning. It has been constant all day. The pain does not radiate. She denies other symptoms.     Initial orders will be placed and care will be transferred to an alternate provider when patient is roomed for a full evaluation. Any additional orders and the final disposition will be determined by that provider.           ORDERS  Labs Reviewed   CBC W/ AUTO DIFFERENTIAL   COMPREHENSIVE METABOLIC PANEL   TROPONIN I   TROPONIN I   B-TYPE NATRIURETIC PEPTIDE       ED Orders (720h ago, onward)    Start Ordered     Status Ordering Provider    01/26/22 2159 01/26/22 1858  Troponin I #2  Once Timed         Ordered FER PELAEZ    01/26/22 1900 01/26/22 1858  aspirin tablet 325 mg  ED 1 Time         Ordered FER PELAEZ    01/26/22 1859 01/26/22 1858  Vital signs  Every 15 min         Ordered FER PELAEZ    01/26/22 1859 01/26/22 1858  Cardiac Monitoring - Adult  Continuous        Comments: Notify Physician If:    Ordered FER PELAEZ    01/26/22 1859 01/26/22 1858  Pulse Oximetry Continuous  Continuous         Ordered FER PELAEZ    01/26/22 1859 01/26/22 1858  Diet NPO  Diet effective now         Ordered FER PELAEZ    01/26/22 1859 01/26/22 1858  Saline lock IV  Once         Ordered FER PELAEZ    01/26/22 1859 01/26/22 1858  EKG 12-lead  Once         Comments: Do not perform if previously done during this visit/ triage    Ordered FER PELAEZ    01/26/22 1859 01/26/22 1858  CBC auto differential  STAT         Ordered FER PELAEZ    01/26/22 1859 01/26/22 1858  Comprehensive metabolic panel  STAT         Ordered FER PELAEZ    01/26/22 1859 01/26/22 1858  Troponin I #1  STAT         Ordered FER PELAEZ    01/26/22 1859 01/26/22 1858  BNP  STAT         Ordered FER PELAEZ            Virtual Visit Note: The provider triage portion of this emergency department evaluation and documentation was performed via enEvolv, a HIPAA-compliant telemedicine application, in concert with a tele-presenter in the room. A face to face patient evaluation with one of my colleagues will occur once the patient is placed in an emergency department room.      DISCLAIMER: This note was prepared with Vindi voice recognition transcription software. Garbled syntax, mangled pronouns, and other bizarre constructions may be attributed to that software system.

## 2022-01-27 NOTE — DISCHARGE INSTRUCTIONS
You chest pain today does not appear consistent with heart attack or any other dangerous cause.  Begin taking omeprazole for possible acid reflux.  Follow-up with your cardiologist as scheduled next week.  Return to the ER for worsening symptoms, changes in your symptoms, including shortness of breath, sweating, nausea/vomiting, radiation of pain, or any other concerns.

## 2022-02-01 ENCOUNTER — PATIENT OUTREACH (OUTPATIENT)
Dept: ADMINISTRATIVE | Facility: OTHER | Age: 52
End: 2022-02-01
Payer: OTHER GOVERNMENT

## 2022-02-01 NOTE — PROGRESS NOTES
Health Maintenance Due   Topic Date Due    Pneumococcal Vaccines (Age 0-64) (1 of 2 - PPSV23) Never done    Shingles Vaccine (1 of 2) Never done    Influenza Vaccine (1) 09/01/2021     Updates were requested from care everywhere.  Chart was reviewed for overdue Proactive [facility] Encounters (LEYDA) topics (CRS, Breast Cancer Screening, Eye exam)  Health Maintenance has been updated.  LINKS immunization registry triggered.  Immunizations were reconciled.

## 2022-02-02 ENCOUNTER — OFFICE VISIT (OUTPATIENT)
Dept: CARDIOLOGY | Facility: CLINIC | Age: 52
End: 2022-02-02
Payer: OTHER GOVERNMENT

## 2022-02-02 VITALS
BODY MASS INDEX: 35.42 KG/M2 | SYSTOLIC BLOOD PRESSURE: 132 MMHG | WEIGHT: 233.69 LBS | DIASTOLIC BLOOD PRESSURE: 86 MMHG | HEIGHT: 68 IN | HEART RATE: 70 BPM

## 2022-02-02 DIAGNOSIS — I20.89 OTHER FORMS OF ANGINA PECTORIS: ICD-10-CM

## 2022-02-02 DIAGNOSIS — I25.10 CORONARY ARTERY DISEASE INVOLVING NATIVE CORONARY ARTERY OF NATIVE HEART WITHOUT ANGINA PECTORIS: ICD-10-CM

## 2022-02-02 DIAGNOSIS — Z95.5 STATUS POST CORONARY ARTERY STENT PLACEMENT: Primary | ICD-10-CM

## 2022-02-02 DIAGNOSIS — E66.09 CLASS 1 OBESITY DUE TO EXCESS CALORIES WITH SERIOUS COMORBIDITY AND BODY MASS INDEX (BMI) OF 33.0 TO 33.9 IN ADULT: ICD-10-CM

## 2022-02-02 DIAGNOSIS — I25.10 ATHEROSCLEROSIS OF NATIVE CORONARY ARTERY OF NATIVE HEART WITHOUT ANGINA PECTORIS: ICD-10-CM

## 2022-02-02 DIAGNOSIS — I25.2 HISTORY OF NON-ST ELEVATION MYOCARDIAL INFARCTION (NSTEMI): ICD-10-CM

## 2022-02-02 DIAGNOSIS — I10 ESSENTIAL HYPERTENSION: ICD-10-CM

## 2022-02-02 PROCEDURE — 99214 PR OFFICE/OUTPT VISIT, EST, LEVL IV, 30-39 MIN: ICD-10-PCS | Mod: S$PBB,,, | Performed by: INTERNAL MEDICINE

## 2022-02-02 PROCEDURE — 99999 PR PBB SHADOW E&M-EST. PATIENT-LVL IV: CPT | Mod: PBBFAC,,, | Performed by: INTERNAL MEDICINE

## 2022-02-02 PROCEDURE — 99214 OFFICE O/P EST MOD 30 MIN: CPT | Mod: PBBFAC,PO | Performed by: INTERNAL MEDICINE

## 2022-02-02 PROCEDURE — 99999 PR PBB SHADOW E&M-EST. PATIENT-LVL IV: ICD-10-PCS | Mod: PBBFAC,,, | Performed by: INTERNAL MEDICINE

## 2022-02-02 PROCEDURE — 99214 OFFICE O/P EST MOD 30 MIN: CPT | Mod: S$PBB,,, | Performed by: INTERNAL MEDICINE

## 2022-02-08 NOTE — PROGRESS NOTES
"  Physical Therapy Daily Treatment Note     Name: Lizet Palencia  [facility] [place] Number: [id number]    Therapy Diagnosis:   Encounter Diagnoses   Name Primary?    Lateral pain of hip Yes    Decreased strength of lower extremity      Physician: Marta Louis MD    Visit Date: 2/9/2022    Physician Orders: PT Eval and Treat   Medical Diagnosis from Referral:   M54.42 (ICD-10-CM) - Acute left-sided low back pain with left-sided sciatica   M53.3 (ICD-10-CM) - Sacroiliac joint dysfunction of left side   M76.32 (ICD-10-CM) - It band syndrome, left      Evaluation Date: 1/25/2022  Authorization Period Expiration: 1/24/23  Plan of Care Expiration: 4/25/22  Visit # / Visits authorized: 1/ 20 (+eval)    Time In: 5:50 pm   Time Out: 6:35 pm   Total Billable Time: 45 minutes    Precautions: Standard    Subjective     Pt reports: Her left leg has been bothering her. The pain is not constant but it has happening more frequently. Her knee has been giving out on her laltely  She was compliant with home exercise program.  Response to previous treatment: Eval  Functional change: Ongoing    Pain: 0/10  Location: Left lateral thigh  Pts goals: decrease pain significantly, keep pain away, continue walking for exercise     Objective     Lizet received therapeutic exercises to develop strength, endurance, ROM, flexibility, posture and core stabilization for 45 minutes including:     +Nu-Step x 8' (treadmill nv)      +LTR x 3'  +DKTC 10 x 10"  Supine L IT Band Str c/ strap 3x30"  Bridges RTB 2 x 10  SL Clam RTB 2 x10  +Ball and belt x 2' (abd/add isometrics)  +Shuttle DBL c/ RTB at knees x 20 1.5 cords, x 10 2 blk  +SL shuttle 1 rd cord 2 x 10  +Step ups 4"  2 x 10   LAQ 3# 2 x 10        Home Exercises Provided and Patient Education Provided     Education provided:       Written Home Exercises Provided: Patient instructed to cont prior HEP.  Exercises were reviewed and Lizet was able to demonstrate them prior to the end of the " "session.  Lizet demonstrated good  understanding of the education provided.     See EMR under Patient Instructions for exercises provided 1/25/2022.     Assessment   Lizet tolerated first treatment session well. Reviewed HEP with pt demonstrating good performance. Added hip mobility and LE strengthening with good tolerance. Pt had difficulty with step ups. Initially attempted 6" step with pt having difficulty due to reports of pain so 4" was used instead with better tolerance. Continue progressing pt to tolerance to address deficits.      Lizet Is progressing well towards her goals.   Pt prognosis is Good.     Pt will continue to benefit from skilled outpatient physical therapy to address the deficits listed in the problem list box on initial evaluation, provide pt/family education and to maximize pt's level of independence in the home and community environment.     Pt's spiritual, cultural and educational needs considered and pt agreeable to plan of care and goals.     Anticipated Barriers for therapy: COVID-19 Concerns       Short Term GOALS: 4 weeks. Pt agrees with goals set.  1. Patient demonstrates independence with HEP.   2. Patient demonstrates independence with Postural Awareness.   3. Patient demonstrates independence with body mechanics.   4. Patient will report pain of 4/10 at worst, on 0-10 pain scale, with all activity  5. Patient demonstrates ability to walk for 1/2 mile, proper gait pattern, no pain provocation  6. Patient demonstrates increased strength BLE's to 4/5 or greater to improve tolerance to functional activities pain free.     Long Term GOALS: 8 weeks. Pt agrees with goals set.  1. Patient demonstrates ability to climb 2 flights of stairs, reciprocal step pattern, no pain provocation  2. Patient demonstrates increased strength BLE's to 4+/5 or greater to improve tolerance to functional activities pain free.   3. Patient demonstrates ability to perform 30 minutes exercise class, moderate " intensity, no pain provocation  4. Patient will report pain of 2/10 at worst, on 0-10 pain scale, with all activity  5. Patient demonstrates ability to walk 1 mile, proper gait pattern, no pain provocation         Plan   Plan of care Certification: 1/25/2022 to 3/25/22.     Outpatient Physical Therapy 2 times weekly for 8 weeks to include the following interventions: Electrical Stimulation IFC/NMES, Gait Training, Manual Therapy, Moist Heat/ Ice, Neuromuscular Re-ed, Patient Education, Self Care, Therapeutic Activities, Therapeutic Exercise and Dry Needling.  Pt may be seen by PTA as part of the rehabilitation team.     Continue with established Plan of Care towards established PT goals.       Stuart West, MARYANNE   02/09/2022

## 2022-02-09 ENCOUNTER — CLINICAL SUPPORT (OUTPATIENT)
Dept: REHABILITATION | Facility: HOSPITAL | Age: 52
End: 2022-02-09
Attending: FAMILY MEDICINE
Payer: OTHER GOVERNMENT

## 2022-02-09 DIAGNOSIS — R29.898 DECREASED STRENGTH OF LOWER EXTREMITY: ICD-10-CM

## 2022-02-09 DIAGNOSIS — M25.559 LATERAL PAIN OF HIP: Primary | ICD-10-CM

## 2022-02-09 PROCEDURE — 97110 THERAPEUTIC EXERCISES: CPT | Mod: PN,CQ

## 2022-02-11 ENCOUNTER — HOSPITAL ENCOUNTER (OUTPATIENT)
Dept: RADIOLOGY | Facility: HOSPITAL | Age: 52
Discharge: HOME | End: 2022-02-11
Attending: INTERNAL MEDICINE
Payer: OTHER GOVERNMENT

## 2022-02-11 ENCOUNTER — HOSPITAL ENCOUNTER (OUTPATIENT)
Dept: CARDIOLOGY | Facility: HOSPITAL | Age: 52
Discharge: HOME | End: 2022-02-11
Attending: INTERNAL MEDICINE
Payer: OTHER GOVERNMENT

## 2022-02-11 DIAGNOSIS — I20.89 OTHER FORMS OF ANGINA PECTORIS: ICD-10-CM

## 2022-02-11 DIAGNOSIS — Z95.5 STATUS POST CORONARY ARTERY STENT PLACEMENT: ICD-10-CM

## 2022-02-11 DIAGNOSIS — I25.10 ATHEROSCLEROSIS OF NATIVE CORONARY ARTERY OF NATIVE HEART WITHOUT ANGINA PECTORIS: ICD-10-CM

## 2022-02-11 DIAGNOSIS — I25.2 HISTORY OF NON-ST ELEVATION MYOCARDIAL INFARCTION (NSTEMI): ICD-10-CM

## 2022-02-11 LAB
CV STRESS BASE HR: 72 BPM
DIASTOLIC BLOOD PRESSURE: 82 MMHG
OHS CV CPX 85 PERCENT MAX PREDICTED HEART RATE MALE: 138
OHS CV CPX MAX PREDICTED HEART RATE: 162
OHS CV CPX PATIENT IS FEMALE: 1
OHS CV CPX PATIENT IS MALE: 0
OHS CV CPX PEAK DIASTOLIC BLOOD PRESSURE: 82 MMHG
OHS CV CPX PEAK HEAR RATE: 108 BPM
OHS CV CPX PEAK RATE PRESSURE PRODUCT: NORMAL
OHS CV CPX PEAK SYSTOLIC BLOOD PRESSURE: 137 MMHG
OHS CV CPX PERCENT MAX PREDICTED HEART RATE ACHIEVED: 67
OHS CV CPX RATE PRESSURE PRODUCT PRESENTING: 9864
SYSTOLIC BLOOD PRESSURE: 137 MMHG

## 2022-02-11 PROCEDURE — 93016 NUCLEAR STRESS TEST (CUPID ONLY): ICD-10-PCS | Mod: ,,, | Performed by: INTERNAL MEDICINE

## 2022-02-11 PROCEDURE — 93018 NUCLEAR STRESS TEST (CUPID ONLY): ICD-10-PCS | Mod: ,,, | Performed by: INTERNAL MEDICINE

## 2022-02-11 PROCEDURE — 93018 CV STRESS TEST I&R ONLY: CPT | Mod: ,,, | Performed by: INTERNAL MEDICINE

## 2022-02-11 PROCEDURE — 93016 CV STRESS TEST SUPVJ ONLY: CPT | Mod: ,,, | Performed by: INTERNAL MEDICINE

## 2022-02-11 PROCEDURE — 78452 NM MYOCARDIAL PERFUSION SPECT MULTI PHARM: ICD-10-PCS | Mod: 26,,, | Performed by: RADIOLOGY

## 2022-02-11 PROCEDURE — 78452 HT MUSCLE IMAGE SPECT MULT: CPT | Mod: 26,,, | Performed by: RADIOLOGY

## 2022-02-11 PROCEDURE — A9502 TC99M TETROFOSMIN: HCPCS

## 2022-02-11 PROCEDURE — 63600175 PHARM REV CODE 636 W HCPCS: Performed by: INTERNAL MEDICINE

## 2022-02-11 PROCEDURE — 93017 CV STRESS TEST TRACING ONLY: CPT

## 2022-02-11 RX ORDER — REGADENOSON 0.08 MG/ML
0.4 INJECTION, SOLUTION INTRAVENOUS ONCE
Status: COMPLETED | OUTPATIENT
Start: 2022-02-11 | End: 2022-02-11

## 2022-02-11 RX ADMIN — REGADENOSON 0.4 MG: 0.08 INJECTION, SOLUTION INTRAVENOUS at 12:02

## 2022-03-03 ENCOUNTER — CLINICAL SUPPORT (OUTPATIENT)
Dept: REHABILITATION | Facility: HOSPITAL | Age: 52
End: 2022-03-03
Attending: FAMILY MEDICINE
Payer: OTHER GOVERNMENT

## 2022-03-03 DIAGNOSIS — R29.898 DECREASED STRENGTH OF LOWER EXTREMITY: ICD-10-CM

## 2022-03-03 DIAGNOSIS — M25.559 LATERAL PAIN OF HIP: Primary | ICD-10-CM

## 2022-03-03 PROCEDURE — 97140 MANUAL THERAPY 1/> REGIONS: CPT | Mod: PN

## 2022-03-03 PROCEDURE — 97110 THERAPEUTIC EXERCISES: CPT | Mod: PN

## 2022-03-03 NOTE — PROGRESS NOTES
"  Physical Therapy Daily Treatment Note     Name: Lizet Palencia  [facility] Number: [id number]    Therapy Diagnosis:   Encounter Diagnoses   Name Primary?    Lateral pain of hip Yes    Decreased strength of lower extremity      Physician: Marta Louis MD  Visit Date: 3/3/2022    Physician Orders: PT Eval and Treat   Medical Diagnosis from Referral:   M54.42 (ICD-10-CM) - Acute left-sided low back pain with left-sided sciatica   M53.3 (ICD-10-CM) - Sacroiliac joint dysfunction of left side   M76.32 (ICD-10-CM) - It band syndrome, left      Evaluation Date: 1/25/2022  Authorization Period Expiration: 1/24/23  Plan of Care Expiration: 4/25/22  Visit # / Visits authorized: 1/ 20 (+eval)    Time In: 5:15 pm   Time Out: 6:00 pm   Total Billable Time: 30 (1:1 with PT)    Precautions: Standard    Subjective     Pt reports: She is still having L lateral hip and knee pain, L lateral hip is more painful. Feels L knee pain with increased weight-bearing  She was compliant with home exercise program.  Response to previous treatment: Eval  Functional change: Ongoing    Pain: 4/10  Location: Left lateral thigh  Pts goals: decrease pain significantly, keep pain away, continue walking for exercise     Objective     Lizet received therapeutic exercises to develop strength, endurance, ROM, flexibility, posture and core stabilization for 35 minutes including:     Upright bike 6 mins  Supine L IT Band Str c/ strap 3x30"  Bridges RTB 2 x 10  +SL Clam RTB 2 x10  +SL Hip Abd 3x10 L LE    Shuttle DBL c/ RTB at knees 2- black 3x10  SL shuttle 1 rd cord 2 x 10  +Sit to Stand RTB at knees 18" box + foam 2 x10  Step ups 4"  2 x 10   LAQ 3# 2 x 10      Lizet received manual therapy to L lateral hip/knee for 10 minutes including:   STM roller to L glutes, lateral hip flexor/quad/HS      Home Exercises Provided and Patient Education Provided     Education provided:       Written Home Exercises Provided: Patient instructed to cont prior " HEP.  Exercises were reviewed and Lizet was able to demonstrate them prior to the end of the session.  Lizet demonstrated good  understanding of the education provided.     See EMR under Patient Instructions for exercises provided 1/25/2022.     Assessment     Lizet tolerated treatment session well, without significant pain provocation; easily achieved muscle fatigue in glute musculature. Band provided at knees for increased glute engagement throughout mat and weight-bearing activities with cueing for proper exercise form to prevent valgus collapse. Great response to STM to lateral hip musculature with reduced symptoms reported afterwards    Lizet Is progressing well towards her goals.   Pt prognosis is Good.     Pt will continue to benefit from skilled outpatient physical therapy to address the deficits listed in the problem list box on initial evaluation, provide pt/family education and to maximize pt's level of independence in the home and community environment.     Pt's spiritual, cultural and educational needs considered and pt agreeable to plan of care and goals.     Anticipated Barriers for therapy: COVID-19 Concerns       Short Term GOALS: 4 weeks. Pt agrees with goals set.  1. Patient demonstrates independence with HEP.   2. Patient demonstrates independence with Postural Awareness.   3. Patient demonstrates independence with body mechanics.   4. Patient will report pain of 4/10 at worst, on 0-10 pain scale, with all activity  5. Patient demonstrates ability to walk for 1/2 mile, proper gait pattern, no pain provocation  6. Patient demonstrates increased strength BLE's to 4/5 or greater to improve tolerance to functional activities pain free.     Long Term GOALS: 8 weeks. Pt agrees with goals set.  1. Patient demonstrates ability to climb 2 flights of stairs, reciprocal step pattern, no pain provocation  2. Patient demonstrates increased strength BLE's to 4+/5 or greater to improve tolerance to  functional activities pain free.   3. Patient demonstrates ability to perform 30 minutes exercise class, moderate intensity, no pain provocation  4. Patient will report pain of 2/10 at worst, on 0-10 pain scale, with all activity  5. Patient demonstrates ability to walk 1 mile, proper gait pattern, no pain provocation         Plan   Plan of care Certification: 1/25/2022 to 3/25/22.     Outpatient Physical Therapy 2 times weekly for 8 weeks to include the following interventions: Electrical Stimulation IFC/NMES, Gait Training, Manual Therapy, Moist Heat/ Ice, Neuromuscular Re-ed, Patient Education, Self Care, Therapeutic Activities, Therapeutic Exercise and Dry Needling.  Pt may be seen by PTA as part of the rehabilitation team.     Continue with established Plan of Care towards established PT goals.       Pipo Mendez, PT   03/03/2022

## 2022-05-09 ENCOUNTER — LAB VISIT (OUTPATIENT)
Dept: LAB | Facility: HOSPITAL | Age: 52
End: 2022-05-09
Attending: FAMILY MEDICINE
Payer: OTHER GOVERNMENT

## 2022-05-09 ENCOUNTER — OFFICE VISIT (OUTPATIENT)
Dept: FAMILY MEDICINE | Facility: CLINIC | Age: 52
End: 2022-05-09
Payer: OTHER GOVERNMENT

## 2022-05-09 VITALS
SYSTOLIC BLOOD PRESSURE: 114 MMHG | BODY MASS INDEX: 34.95 KG/M2 | OXYGEN SATURATION: 99 % | HEART RATE: 90 BPM | TEMPERATURE: 98 F | HEIGHT: 68 IN | DIASTOLIC BLOOD PRESSURE: 86 MMHG | WEIGHT: 230.63 LBS

## 2022-05-09 DIAGNOSIS — N92.6 IRREGULAR MENSES: ICD-10-CM

## 2022-05-09 DIAGNOSIS — Z79.899 MEDICATION MANAGEMENT: ICD-10-CM

## 2022-05-09 DIAGNOSIS — Z00.00 LABORATORY EXAMINATION ORDERED AS PART OF A ROUTINE GENERAL MEDICAL EXAMINATION: ICD-10-CM

## 2022-05-09 DIAGNOSIS — R73.03 PREDIABETES: ICD-10-CM

## 2022-05-09 DIAGNOSIS — Z95.5 STATUS POST CORONARY ARTERY STENT PLACEMENT: ICD-10-CM

## 2022-05-09 DIAGNOSIS — R76.8 HEPATITIS C ANTIBODY TEST POSITIVE: ICD-10-CM

## 2022-05-09 DIAGNOSIS — Z79.82 LONG-TERM USE OF ASPIRIN THERAPY: ICD-10-CM

## 2022-05-09 DIAGNOSIS — I10 ESSENTIAL HYPERTENSION: ICD-10-CM

## 2022-05-09 DIAGNOSIS — I25.2 HISTORY OF NON-ST ELEVATION MYOCARDIAL INFARCTION (NSTEMI): ICD-10-CM

## 2022-05-09 DIAGNOSIS — I25.10 CORONARY ARTERY DISEASE INVOLVING NATIVE CORONARY ARTERY OF NATIVE HEART WITHOUT ANGINA PECTORIS: ICD-10-CM

## 2022-05-09 DIAGNOSIS — E66.01 CLASS 2 SEVERE OBESITY DUE TO EXCESS CALORIES WITH SERIOUS COMORBIDITY AND BODY MASS INDEX (BMI) OF 35.0 TO 35.9 IN ADULT: ICD-10-CM

## 2022-05-09 DIAGNOSIS — T50.2X5A DIURETIC-INDUCED HYPOKALEMIA: ICD-10-CM

## 2022-05-09 DIAGNOSIS — E55.9 VITAMIN D DEFICIENCY: ICD-10-CM

## 2022-05-09 DIAGNOSIS — F10.10 EXCESSIVE DRINKING OF ALCOHOL: ICD-10-CM

## 2022-05-09 DIAGNOSIS — Z23 NEED FOR SHINGLES VACCINE: ICD-10-CM

## 2022-05-09 DIAGNOSIS — E87.6 DIURETIC-INDUCED HYPOKALEMIA: ICD-10-CM

## 2022-05-09 DIAGNOSIS — Z00.00 ROUTINE GENERAL MEDICAL EXAMINATION AT A HEALTH CARE FACILITY: Primary | ICD-10-CM

## 2022-05-09 LAB
ALBUMIN SERPL BCP-MCNC: 3.4 G/DL (ref 3.5–5.2)
ALP SERPL-CCNC: 46 U/L (ref 55–135)
ALT SERPL W/O P-5'-P-CCNC: 11 U/L (ref 10–44)
ANION GAP SERPL CALC-SCNC: 9 MMOL/L (ref 8–16)
AST SERPL-CCNC: 15 U/L (ref 10–40)
BASOPHILS # BLD AUTO: 0.04 K/UL (ref 0–0.2)
BASOPHILS NFR BLD: 1 % (ref 0–1.9)
BILIRUB SERPL-MCNC: 0.3 MG/DL (ref 0.1–1)
BUN SERPL-MCNC: 17 MG/DL (ref 6–20)
CALCIUM SERPL-MCNC: 9.2 MG/DL (ref 8.7–10.5)
CHLORIDE SERPL-SCNC: 106 MMOL/L (ref 95–110)
CHOLEST SERPL-MCNC: 123 MG/DL (ref 120–199)
CHOLEST/HDLC SERPL: 3 {RATIO} (ref 2–5)
CO2 SERPL-SCNC: 25 MMOL/L (ref 23–29)
CREAT SERPL-MCNC: 0.8 MG/DL (ref 0.5–1.4)
DIFFERENTIAL METHOD: NORMAL
EOSINOPHIL # BLD AUTO: 0.1 K/UL (ref 0–0.5)
EOSINOPHIL NFR BLD: 1.2 % (ref 0–8)
ERYTHROCYTE [DISTWIDTH] IN BLOOD BY AUTOMATED COUNT: 13 % (ref 11.5–14.5)
EST. GFR  (AFRICAN AMERICAN): >60 ML/MIN/1.73 M^2
EST. GFR  (NON AFRICAN AMERICAN): >60 ML/MIN/1.73 M^2
ESTIMATED AVG GLUCOSE: 134 MG/DL (ref 68–131)
GLUCOSE SERPL-MCNC: 120 MG/DL (ref 70–110)
HBA1C MFR BLD: 6.3 % (ref 4–5.6)
HCT VFR BLD AUTO: 40.4 % (ref 37–48.5)
HDLC SERPL-MCNC: 41 MG/DL (ref 40–75)
HDLC SERPL: 33.3 % (ref 20–50)
HGB BLD-MCNC: 13.2 G/DL (ref 12–16)
IMM GRANULOCYTES # BLD AUTO: 0.01 K/UL (ref 0–0.04)
IMM GRANULOCYTES NFR BLD AUTO: 0.2 % (ref 0–0.5)
LDLC SERPL CALC-MCNC: 74 MG/DL (ref 63–159)
LYMPHOCYTES # BLD AUTO: 1.4 K/UL (ref 1–4.8)
LYMPHOCYTES NFR BLD: 35.6 % (ref 18–48)
MAGNESIUM SERPL-MCNC: 1.8 MG/DL (ref 1.6–2.6)
MCH RBC QN AUTO: 30.6 PG (ref 27–31)
MCHC RBC AUTO-ENTMCNC: 32.7 G/DL (ref 32–36)
MCV RBC AUTO: 94 FL (ref 82–98)
MONOCYTES # BLD AUTO: 0.6 K/UL (ref 0.3–1)
MONOCYTES NFR BLD: 13.8 % (ref 4–15)
NEUTROPHILS # BLD AUTO: 2 K/UL (ref 1.8–7.7)
NEUTROPHILS NFR BLD: 48.2 % (ref 38–73)
NONHDLC SERPL-MCNC: 82 MG/DL
NRBC BLD-RTO: 0 /100 WBC
PLATELET # BLD AUTO: 169 K/UL (ref 150–450)
PMV BLD AUTO: 10.8 FL (ref 9.2–12.9)
POTASSIUM SERPL-SCNC: 3.8 MMOL/L (ref 3.5–5.1)
PROT SERPL-MCNC: 7 G/DL (ref 6–8.4)
RBC # BLD AUTO: 4.31 M/UL (ref 4–5.4)
SODIUM SERPL-SCNC: 140 MMOL/L (ref 136–145)
TRIGL SERPL-MCNC: 40 MG/DL (ref 30–150)
TSH SERPL DL<=0.005 MIU/L-ACNC: 0.81 UIU/ML (ref 0.4–4)
VIT B12 SERPL-MCNC: 469 PG/ML (ref 210–950)
WBC # BLD AUTO: 4.05 K/UL (ref 3.9–12.7)

## 2022-05-09 PROCEDURE — 99999 PR PBB SHADOW E&M-EST. PATIENT-LVL V: ICD-10-PCS | Mod: PBBFAC,,, | Performed by: FAMILY MEDICINE

## 2022-05-09 PROCEDURE — 83036 HEMOGLOBIN GLYCOSYLATED A1C: CPT | Performed by: FAMILY MEDICINE

## 2022-05-09 PROCEDURE — 99999 PR PBB SHADOW E&M-EST. PATIENT-LVL V: CPT | Mod: PBBFAC,,, | Performed by: FAMILY MEDICINE

## 2022-05-09 PROCEDURE — 82652 VIT D 1 25-DIHYDROXY: CPT | Performed by: FAMILY MEDICINE

## 2022-05-09 PROCEDURE — 85025 COMPLETE CBC W/AUTO DIFF WBC: CPT | Performed by: FAMILY MEDICINE

## 2022-05-09 PROCEDURE — 84443 ASSAY THYROID STIM HORMONE: CPT | Performed by: FAMILY MEDICINE

## 2022-05-09 PROCEDURE — 99396 PREV VISIT EST AGE 40-64: CPT | Mod: S$PBB,,, | Performed by: FAMILY MEDICINE

## 2022-05-09 PROCEDURE — 99215 OFFICE O/P EST HI 40 MIN: CPT | Mod: PBBFAC,PO | Performed by: FAMILY MEDICINE

## 2022-05-09 PROCEDURE — 99396 PR PREVENTIVE VISIT,EST,40-64: ICD-10-PCS | Mod: S$PBB,,, | Performed by: FAMILY MEDICINE

## 2022-05-09 PROCEDURE — 36415 COLL VENOUS BLD VENIPUNCTURE: CPT | Performed by: FAMILY MEDICINE

## 2022-05-09 PROCEDURE — 82607 VITAMIN B-12: CPT | Performed by: FAMILY MEDICINE

## 2022-05-09 PROCEDURE — 80053 COMPREHEN METABOLIC PANEL: CPT | Performed by: FAMILY MEDICINE

## 2022-05-09 PROCEDURE — 80061 LIPID PANEL: CPT | Performed by: FAMILY MEDICINE

## 2022-05-09 PROCEDURE — 83735 ASSAY OF MAGNESIUM: CPT | Performed by: FAMILY MEDICINE

## 2022-05-09 RX ORDER — METFORMIN HYDROCHLORIDE 500 MG/1
1000 TABLET, EXTENDED RELEASE ORAL
Qty: 180 TABLET | Refills: 4 | Status: SHIPPED | OUTPATIENT
Start: 2022-05-09 | End: 2022-10-18 | Stop reason: SDUPTHER

## 2022-05-09 NOTE — PROGRESS NOTES
Office Visit    Patient Name: Lizet Palencia    : 1970  MRN: [id number]    Subjective:  Lizet is a 52 y.o. female who presents today for:    Annual Exam    Lizet Palencia presents today for annual wellness exam and monitoring of chronic conditions that include CAD w/ h/o stent/NSTEMI, HTN, prediabetes(A1c 6.3 22), HLD, Obesity.   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  She likes her job but it is sedentary.  She is in school for her bachelor's degree and has 2 more semesters.     She is premenopausal, and recently her periods are becoming more irregular.  No hot flashes.   She previously saw a gynecologist and is up to date with pap-- pap/HPV w/ me 21.      She has been feel ing overall well but is frustrated with increasing A1c and weight gain.     General lifestyle habits are as follows:   Diet: fair- eats out about 5 nights of the week, but she tries to make healthier low carb choices that include salads and seafood /lean meat & working on drinking more water - generally has 2 alcoholic drinks like Champagne or mixed drinks with dinner.  Exercise: poor - recently very sedentary, not exercising and sitting with her school work and job.  Weight INCREASING she put back on about 10 of 18 lb she had previously lost.  BMI currently 35.      Immunizations: declines FLU shot, TDaP 10/25/2017, COVID-19 VACCINE SERIES COMPLETED 2/3/21 w/ Pfizer BOOSTER 22, SHINGRIX ADVISED     Screening Tests: mammogram 12/3/21 -- repeat 1 year,  PAP/HPV WNL/(-) 21- REPEAT 5 YRS, HIV (-) 3/16/20, Hep C (-) 3/16/20, C-SCOPE ORDERED but DECLINED & FIT (-) 21     Eye/Dental: reports up to date with both      Follows with cardiology now Dr Cox- most recently seen 22- F/U  6 months.  CAD s/p PCI LCX to NSTEMI in  with 3.5 x 15 resolute ALDA, LHC in  showed patent stent  - ECHO 2015 EF 60-65%  -SPECT STRESS 22:  Scintigraphically negative for ischemia or infarct    PAST MEDICAL HISTORY, SURGICAL/SOCIAL/FAMILY HISTORY REVIEWED AS PER CHART, WITH PERTINENT FINDINGS INCLUDED IN HISTORY SECTION OF NOTE.     Current Medications    Medication List with Changes/Refills   New Medications    METFORMIN (GLUCOPHAGE-XR) 500 MG ER 24HR TABLET    Take 2 tablets (1,000 mg total) by mouth daily with dinner or evening meal.   Current Medications    AMLODIPINE (NORVASC) 10 MG TABLET    Take 1 tablet (10 mg total) by mouth once daily.    ASPIRIN 81 MG CHEW    Take 81 mg by mouth once daily.    ATORVASTATIN (LIPITOR) 80 MG TABLET    Take 1 tablet (80 mg total) by mouth once daily.    CARVEDILOL (COREG) 25 MG TABLET    TAKE 1 TABLET BY MOUTH TWICE DAILY WITH MEAL    CETIRIZINE (ZYRTEC) 10 MG TABLET    Take 1 tablet (10 mg total) by mouth once daily.    CHLORTHALIDONE (HYGROTEN) 25 MG TAB    Take 1 tablet (25 mg total) by mouth once daily.    EZETIMIBE (ZETIA) 10 MG TABLET    Take 1 tablet (10 mg total) by mouth once daily.    FLUTICASONE PROPIONATE (FLONASE) 50 MCG/ACTUATION NASAL SPRAY    2 sprays (100 mcg total) by Each Nostril route once daily.    FLUTICASONE PROPIONATE (FLONASE) 50 MCG/ACTUATION NASAL SPRAY    2 sprays (100 mcg total) by Each Nostril route once daily.    LOSARTAN (COZAAR) 100 MG TABLET    Take 1 tablet (100 mg total) by mouth once daily.    OMEPRAZOLE (PRILOSEC) 20 MG CAPSULE    Take 1 capsule (20 mg total) by mouth once daily. for 14 days    POTASSIUM CHLORIDE (K-TAB) 20 MEQ    Take 1 tablet (20 mEq total) by mouth once daily.    TRIAMCINOLONE ACETONIDE TOP           Allergies   Review of patient's allergies indicates:   Allergen Reactions    Crab Rash    Shrimp Rash         Review of Systems (Pertinent positives)  Review of Systems   Constitutional: Positive for unexpected weight change.   HENT: Negative for dental problem.    Eyes: Negative for visual disturbance.   Respiratory: Negative for chest tightness and  "shortness of breath.    Cardiovascular: Negative for chest pain, palpitations and leg swelling.   Genitourinary: Positive for menstrual problem (irregularity).   Allergic/Immunologic: Positive for environmental allergies (stable).   Neurological: Negative for dizziness, light-headedness and headaches.       /86   Pulse 90   Temp 98.4 °F (36.9 °C)   Ht 5' 8" (1.727 m)   Wt 104.6 kg (230 lb 9.6 oz)   SpO2 99%   BMI 35.06 kg/m²     Physical Exam  Vitals reviewed.   Constitutional:       General: She is not in acute distress.     Appearance: Normal appearance. She is well-developed.   HENT:      Head: Normocephalic and atraumatic.      Right Ear: Ear canal normal. Tympanic membrane is not erythematous or bulging.      Left Ear: Ear canal normal. Tympanic membrane is not erythematous or bulging.      Nose: Nose normal.      Mouth/Throat:      Mouth: Mucous membranes are moist.      Pharynx: No oropharyngeal exudate.   Eyes:      Extraocular Movements: Extraocular movements intact.      Conjunctiva/sclera: Conjunctivae normal.   Neck:      Thyroid: No thyroid mass or thyromegaly.      Vascular: No carotid bruit.   Cardiovascular:      Rate and Rhythm: Normal rate and regular rhythm.      Pulses:           Dorsalis pedis pulses are 2+ on the right side and 2+ on the left side.      Heart sounds: Normal heart sounds. No murmur heard.  Pulmonary:      Effort: Pulmonary effort is normal. No respiratory distress.      Breath sounds: Normal breath sounds.   Abdominal:      General: Bowel sounds are normal. There is no distension.      Palpations: Abdomen is soft. There is no mass.      Tenderness: There is no abdominal tenderness.   Musculoskeletal:         General: Normal range of motion.      Right lower leg: No edema.      Left lower leg: No edema.   Lymphadenopathy:      Cervical: No cervical adenopathy.   Skin:     General: Skin is warm and dry.      Findings: No rash.   Neurological:      General: No focal " deficit present.      Mental Status: She is alert and oriented to person, place, and time.   Psychiatric:         Mood and Affect: Mood normal.         Behavior: Behavior normal.           Assessment/Plan:  Lizet Palencia is a 52 y.o. female who presents today for :        ICD-10-CM ICD-9-CM    1. Routine general medical examination at a health care facility  Z00.00 V70.0    2. Class 2 severe obesity due to excess calories with serious comorbidity and body mass index (BMI) of 35.0 to 35.9 in adult  E66.01 278.01     Z68.35 V85.35    3. Vitamin D deficiency  E55.9 268.9    4. Essential hypertension  I10 401.9    5. Diuretic-induced hypokalemia  E87.6 276.8     T50.2X5A E944.4    6. Prediabetes  R73.03 790.29 metFORMIN (GLUCOPHAGE-XR) 500 MG ER 24hr tablet      Hemoglobin A1C      Comprehensive Metabolic Panel   7. Coronary artery disease involving native coronary artery of native heart without angina pectoris  I25.10 414.01    8. History of non-ST elevation myocardial infarction (NSTEMI)  I25.2 412    9. Status post coronary artery stent placement  Z95.5 V45.82    10. Long-term use of aspirin therapy  Z79.82 V58.66    11. Excessive drinking of alcohol  F10.10 305.00    12. Hepatitis C antibody test positive  R76.8 795.79    13. Irregular menses  N92.6 626.4 Follicle Stimulating Hormone   14. Medication management  Z79.899 V58.69 Hemoglobin A1C      Comprehensive Metabolic Panel   15. Need for shingles vaccine  Z23 V04.89      ADVISED ON DIET/EXERCISE/SLEEP, ROUTINE EYE/DENTAL EXAMS, AND THE IMPORTANCE OF KEEPING UP WITH APPROPRIATE SCREENING TESTS BASED ON AGE AND RISK FACTORS.  COVID-19 VACCINE SERIES COMPLETED W/  Pfizer BOOSTER 12/21/22, SHINGRIX ADVISED, FIT (-) 11/11/21 & will RE-ADDRESS COLONOSCOPY in 6 months, PAP/HPV NEXT DUE 4/2026     Coronary artery disease status post NSTEMI, stent placement with underlying obesity, hypertension, hyperlipidemia, prediabetes: put on about 10 lbs in the last year with  increase in A1c to 6.3--> starting Metformin 1000XR daily.   Continue high dose STATIN with Lipitor 80 + Zetia 10 to achieve LDL goal < 70, ASA 81 mg daily for secondary prevention. BP controlled on Cozaar 100/ Amlodipine 10./ COREG 25 BID, CHLORTHALIDONE 25 (+ K= supplement)  Continue cardiology follow-up nausea recently seen by Dr. Cox and had an unremarkable SPECT stress test 2/11/22   Repeat A1c, follow-up in 3 months.    GERD:  Controlled on daily PPI, ppi labs monitored.  Advised cutting back on alcohol to 1 drink daily to help with reflux management and weight loss.    IRREGULAR MENSES:  Likely now perimenopausal which may be contributing to weight gain and insulin resistance.    There are no Patient Instructions on file for this visit.      Follow up in about 3 months (around 8/9/2022) for to follow up on lab results, return as needed for new concerns.

## 2022-05-12 LAB — 1,25(OH)2D3 SERPL-MCNC: 33 PG/ML (ref 20–79)

## 2022-07-18 ENCOUNTER — OFFICE VISIT (OUTPATIENT)
Dept: URGENT CARE | Facility: CLINIC | Age: 52
End: 2022-07-18
Payer: OTHER GOVERNMENT

## 2022-07-18 VITALS
OXYGEN SATURATION: 100 % | SYSTOLIC BLOOD PRESSURE: 152 MMHG | HEIGHT: 68 IN | HEART RATE: 69 BPM | BODY MASS INDEX: 34.86 KG/M2 | WEIGHT: 230 LBS | DIASTOLIC BLOOD PRESSURE: 102 MMHG | TEMPERATURE: 98 F | RESPIRATION RATE: 18 BRPM

## 2022-07-18 DIAGNOSIS — I10 PRIMARY HYPERTENSION: ICD-10-CM

## 2022-07-18 DIAGNOSIS — J06.9 ACUTE URI: ICD-10-CM

## 2022-07-18 DIAGNOSIS — J30.9 ALLERGIC RHINITIS, UNSPECIFIED SEASONALITY, UNSPECIFIED TRIGGER: ICD-10-CM

## 2022-07-18 DIAGNOSIS — J34.9 SINUS PROBLEM: Primary | ICD-10-CM

## 2022-07-18 LAB
CTP QC/QA: YES
SARS-COV-2 RDRP RESP QL NAA+PROBE: NEGATIVE

## 2022-07-18 PROCEDURE — U0002 COVID-19 LAB TEST NON-CDC: HCPCS | Mod: QW,S$GLB,, | Performed by: FAMILY MEDICINE

## 2022-07-18 PROCEDURE — 99214 OFFICE O/P EST MOD 30 MIN: CPT | Mod: S$GLB,,, | Performed by: FAMILY MEDICINE

## 2022-07-18 PROCEDURE — 99214 PR OFFICE/OUTPT VISIT, EST, LEVL IV, 30-39 MIN: ICD-10-PCS | Mod: S$GLB,,, | Performed by: FAMILY MEDICINE

## 2022-07-18 PROCEDURE — U0002: ICD-10-PCS | Mod: QW,S$GLB,, | Performed by: FAMILY MEDICINE

## 2022-07-18 RX ORDER — FLUTICASONE PROPIONATE 50 MCG
1 SPRAY, SUSPENSION (ML) NASAL DAILY
Qty: 15.8 ML | Refills: 0 | Status: SHIPPED | OUTPATIENT
Start: 2022-07-18

## 2022-07-18 NOTE — PROGRESS NOTES
"Subjective:       Patient ID: Lizet Palencia is a 52 y.o. female.    Vitals:  height is 5' 7.99" (1.727 m) and weight is 104.3 kg (230 lb). Her temporal temperature is 97.7 °F (36.5 °C). Her blood pressure is 152/102 (abnormal) and her pulse is 69. Her respiration is 18 and oxygen saturation is 100%.     Chief Complaint: Headache    52 y.o female presents today c/o  sinus symptoms x2 weeks. Pt reports hx of sinus allergies.   Patient denies chills, fever, body aches, and denies SOB and chest pain. BP is mildly elevated. Pt ha hx of HTN. Denies HA, dizziness, weakness.   Patient is vaccinated for Covid 19 and reports exposure to Covid 19 at home.    Headache   This is a new problem. The current episode started 1 to 4 weeks ago. The pain is located in the frontal region. The pain does not radiate. The quality of the pain is described as aching. The pain is at a severity of 5/10. The pain is mild. Pertinent negatives include no fever, sinus pressure or sore throat. Nothing aggravates the symptoms. Treatments tried: Aspirin. The treatment provided mild relief.       Constitution: Negative for fever.   HENT: Negative for sinus pressure and sore throat.    Neurological: Negative for headaches.       Objective:      Physical Exam   Constitutional: She is oriented to person, place, and time. She appears well-developed. She is cooperative.  Non-toxic appearance. She does not appear ill. No distress.   HENT:   Head: Normocephalic and atraumatic.   Ears:   Right Ear: Hearing, tympanic membrane, external ear and ear canal normal. impacted cerumen  Left Ear: Hearing, tympanic membrane, external ear and ear canal normal. impacted cerumen  Nose: Congestion present. No mucosal edema, rhinorrhea or nasal deformity. No epistaxis. Right sinus exhibits no maxillary sinus tenderness and no frontal sinus tenderness. Left sinus exhibits no maxillary sinus tenderness and no frontal sinus tenderness.   Mouth/Throat: Uvula is midline, " oropharynx is clear and moist and mucous membranes are normal. No trismus in the jaw. Normal dentition. No uvula swelling. No posterior oropharyngeal erythema.   Eyes: Conjunctivae and lids are normal. Right eye exhibits no discharge. Left eye exhibits no discharge. No scleral icterus.   Neck: Trachea normal and phonation normal. Neck supple.   Cardiovascular: Normal rate, regular rhythm, normal heart sounds and normal pulses.   No murmur heard.  Pulmonary/Chest: Effort normal and breath sounds normal. No stridor. No respiratory distress. She has no wheezes. She has no rhonchi. She has no rales.   Abdominal: Normal appearance and bowel sounds are normal. She exhibits no distension and no mass. Soft. There is no abdominal tenderness.   Musculoskeletal: Normal range of motion.         General: No deformity. Normal range of motion.   Neurological: She is alert and oriented to person, place, and time. She exhibits normal muscle tone. Coordination normal.   Skin: Skin is warm, dry, intact, not diaphoretic and not pale.   Psychiatric: Her speech is normal and behavior is normal. Judgment and thought content normal.   Nursing note and vitals reviewed.        Assessment:       1. Sinus problem    2. Allergic rhinitis, unspecified seasonality, unspecified trigger    3. Acute URI    4. Primary hypertension          Plan:         Sinus problem  -     POCT COVID-19 Rapid Screening    Allergic rhinitis, unspecified seasonality, unspecified trigger    Acute URI    Primary hypertension    Other orders  -     fluticasone propionate (FLONASE) 50 mcg/actuation nasal spray; 1 spray (50 mcg total) by Each Nostril route once daily.  Dispense: 15.8 mL; Refill: 0

## 2022-08-04 ENCOUNTER — LAB VISIT (OUTPATIENT)
Dept: LAB | Facility: HOSPITAL | Age: 52
End: 2022-08-04
Attending: FAMILY MEDICINE
Payer: OTHER GOVERNMENT

## 2022-08-04 DIAGNOSIS — N92.6 IRREGULAR MENSES: ICD-10-CM

## 2022-08-04 DIAGNOSIS — Z79.899 MEDICATION MANAGEMENT: ICD-10-CM

## 2022-08-04 DIAGNOSIS — R73.03 PREDIABETES: ICD-10-CM

## 2022-08-04 LAB
ALBUMIN SERPL BCP-MCNC: 3.5 G/DL (ref 3.5–5.2)
ALP SERPL-CCNC: 45 U/L (ref 55–135)
ALT SERPL W/O P-5'-P-CCNC: 15 U/L (ref 10–44)
ANION GAP SERPL CALC-SCNC: 7 MMOL/L (ref 8–16)
AST SERPL-CCNC: 17 U/L (ref 10–40)
BILIRUB SERPL-MCNC: 0.3 MG/DL (ref 0.1–1)
BUN SERPL-MCNC: 17 MG/DL (ref 6–20)
CALCIUM SERPL-MCNC: 9.3 MG/DL (ref 8.7–10.5)
CHLORIDE SERPL-SCNC: 110 MMOL/L (ref 95–110)
CO2 SERPL-SCNC: 26 MMOL/L (ref 23–29)
CREAT SERPL-MCNC: 0.7 MG/DL (ref 0.5–1.4)
EST. GFR  (NO RACE VARIABLE): >60 ML/MIN/1.73 M^2
ESTIMATED AVG GLUCOSE: 128 MG/DL (ref 68–131)
FSH SERPL-ACNC: 5.72 MIU/ML
GLUCOSE SERPL-MCNC: 104 MG/DL (ref 70–110)
HBA1C MFR BLD: 6.1 % (ref 4–5.6)
POTASSIUM SERPL-SCNC: 3.7 MMOL/L (ref 3.5–5.1)
PROT SERPL-MCNC: 6.9 G/DL (ref 6–8.4)
SODIUM SERPL-SCNC: 143 MMOL/L (ref 136–145)

## 2022-08-04 PROCEDURE — 80053 COMPREHEN METABOLIC PANEL: CPT | Performed by: FAMILY MEDICINE

## 2022-08-04 PROCEDURE — 83001 ASSAY OF GONADOTROPIN (FSH): CPT | Performed by: FAMILY MEDICINE

## 2022-08-04 PROCEDURE — 83036 HEMOGLOBIN GLYCOSYLATED A1C: CPT | Performed by: FAMILY MEDICINE

## 2022-08-04 PROCEDURE — 36415 COLL VENOUS BLD VENIPUNCTURE: CPT | Mod: PO | Performed by: FAMILY MEDICINE

## 2022-08-14 NOTE — PROGRESS NOTES
Office Visit    Patient Name: Lizet Palencia    : 1970  MRN: [id number]    Subjective:  Lizet is a 52 y.o. female who presents today for:    Follow-up (3 month follow up )    Lizet Palencia presents today for 3 month follow up (annual 22) of prediabetes and weight gain.  Chronic conditions include CAD w/ h/o stent/NSTEMI, HTN, prediabetes(A1c 6.3 22), HLD, Obesity.   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  She likes her job but it is sedentary.  She is in school for her bachelor's degree and has 2 more semesters.     She is premenopausal, and recently her periods are becoming more irregular.  No hot flashes.   She previously saw a gynecologist and is up to date with pap-- pap/HPV w/ me 21.  FSH 22 5.7      She has been feel ing overall well. Started Metformin 1000XR daily over the last 3 months and she reports no side effects but trouble with consistency because of timing with with meals.    REPEAT LABS 22 WITH NORMAL CMP, IMPROVEMENT IN A1C FROM 6.3-->6.1.      General lifestyle habits are as follows:   Diet: fair- eats out several nights of the week, but she tries to make healthier low carb choices that include salads and seafood /lean meat & working on drinking more water - generally has 2 alcoholic drinks like Champagne or mixed drinks with dinner.   Exercise: poor - recently very sedentary, not exercising and sitting with her school work and job.  Weight: recently STABLE, prior to previous visit she had put back on about 10 of 18 lb she had previously lost.    BMI currently 35/36.     Immunizations: declines FLU shot, TDaP 10/25/2017, COVID-19 VACCINE SERIES COMPLETED 2/3/21 w/ Pfizer BOOSTER 22, SHINGRIX ADVISED     Screening Tests: mammogram 12/3/21 -- repeat 1 year,  PAP/HPV WNL/(-) 21- REPEAT 5 YRS, HIV (-) 3/16/20, Hep C (-) 3/16/20, C-SCOPE ORDERED but DECLINED & FIT (-)  11/11/21     Eye/Dental: reports up to date with both      Follows with cardiology now Dr Cox- most recently seen 2/2/22- F/U  6 months.  CAD s/p PCI LCX to NSTEMI in 2015 with 3.5 x 15 resolute ALDA, LHC in 2016 showed patent stent  - ECHO 1/2015 EF 60-65%  -SPECT STRESS 2/11/22: Scintigraphically negative for ischemia or infarct       PAST MEDICAL HISTORY, SURGICAL/SOCIAL/FAMILY HISTORY REVIEWED AS PER CHART, WITH PERTINENT FINDINGS INCLUDED IN HISTORY SECTION OF NOTE.     Current Medications    Medication List with Changes/Refills   Current Medications    AMLODIPINE (NORVASC) 10 MG TABLET    Take 1 tablet (10 mg total) by mouth once daily.    ASPIRIN 81 MG CHEW    Take 81 mg by mouth once daily.    ATORVASTATIN (LIPITOR) 80 MG TABLET    Take 1 tablet (80 mg total) by mouth once daily.    CARVEDILOL (COREG) 25 MG TABLET    TAKE 1 TABLET BY MOUTH TWICE DAILY WITH MEAL    CETIRIZINE (ZYRTEC) 10 MG TABLET    Take 1 tablet (10 mg total) by mouth once daily.    CHLORTHALIDONE (HYGROTEN) 25 MG TAB    Take 1 tablet (25 mg total) by mouth once daily.    EZETIMIBE (ZETIA) 10 MG TABLET    Take 1 tablet (10 mg total) by mouth once daily.    FLUTICASONE PROPIONATE (FLONASE) 50 MCG/ACTUATION NASAL SPRAY    2 sprays (100 mcg total) by Each Nostril route once daily.    FLUTICASONE PROPIONATE (FLONASE) 50 MCG/ACTUATION NASAL SPRAY    2 sprays (100 mcg total) by Each Nostril route once daily.    FLUTICASONE PROPIONATE (FLONASE) 50 MCG/ACTUATION NASAL SPRAY    1 spray (50 mcg total) by Each Nostril route once daily.    LOSARTAN (COZAAR) 100 MG TABLET    Take 1 tablet (100 mg total) by mouth once daily.    METFORMIN (GLUCOPHAGE-XR) 500 MG ER 24HR TABLET    Take 2 tablets (1,000 mg total) by mouth daily with dinner or evening meal.    OMEPRAZOLE (PRILOSEC) 20 MG CAPSULE    Take 1 capsule (20 mg total) by mouth once daily. for 14 days    POTASSIUM CHLORIDE (K-TAB) 20 MEQ    Take 1 tablet (20 mEq total) by mouth once daily.     "TRIAMCINOLONE ACETONIDE TOP           Allergies   Review of patient's allergies indicates:   Allergen Reactions    Crab Rash    Shrimp Rash         Review of Systems (Pertinent positives)  Review of Systems   Constitutional: Negative for unexpected weight change.   Eyes: Negative for visual disturbance.   Respiratory: Negative for chest tightness and shortness of breath.    Cardiovascular: Negative for chest pain, palpitations and leg swelling.   Neurological: Negative for dizziness, light-headedness and headaches.       /84 (BP Location: Left arm, Patient Position: Sitting)   Pulse 72   Ht 5' 7" (1.702 m)   Wt 104.9 kg (231 lb 4.2 oz)   SpO2 98%   BMI 36.22 kg/m²     Physical Exam  Vitals reviewed.   Constitutional:       General: She is not in acute distress.     Appearance: Normal appearance. She is well-developed.   HENT:      Head: Normocephalic and atraumatic.   Eyes:      Conjunctiva/sclera: Conjunctivae normal.   Cardiovascular:      Rate and Rhythm: Normal rate and regular rhythm.   Pulmonary:      Effort: Pulmonary effort is normal.      Breath sounds: Normal breath sounds.   Musculoskeletal:      Right lower leg: No edema.      Left lower leg: No edema.   Skin:     General: Skin is warm and dry.   Neurological:      General: No focal deficit present.      Mental Status: She is alert and oriented to person, place, and time.   Psychiatric:         Mood and Affect: Mood normal.         Behavior: Behavior normal.           Assessment/Plan:  Lizet Palencia is a 52 y.o. female who presents today for :        ICD-10-CM ICD-9-CM    1. Prediabetes  R73.03 790.29 Hemoglobin A1C      Comprehensive Metabolic Panel      Lipid Panel      Vitamin D   2. Essential hypertension  I10 401.9    3. Coronary artery disease involving native coronary artery of native heart without angina pectoris  I25.10 414.01    4. Class 2 severe obesity due to excess calories with serious comorbidity and body mass index " (BMI) of 36.0 to 36.9 in adult  E66.01 278.01     Z68.36 V85.36    5. Medication management  Z79.899 V58.69 Hemoglobin A1C      Comprehensive Metabolic Panel      Lipid Panel      Vitamin D   6. Allergic rhinitis, unspecified seasonality, unspecified trigger  J30.9 477.9    7. Need for shingles vaccine  Z23 V04.89    8. Encounter for screening mammogram for breast cancer  Z12.31 V76.12 Mammo Digital Screening Bilat   9. Vitamin D deficiency  E55.9 268.9 Vitamin D     Pfizer BOOSTER 12/21/22, SHINGRIX ADVISED, FIT (-) 11/11/21 *COLONOSCOPY declined-- WILL READDRESS AT FOLLOW UP, mammo due 12/2022-- ORDERED     Coronary artery disease status post NSTEMI, stent placement with underlying obesity, hypertension, hyperlipidemia, prediabetes: previously put on about 10 lbs with increase in A1c to 6.3. A1C IMPROVED TO 6.1 WITH STARTING METFORMIN 1000XR DAILY.  Continue metformin but work on improved consistency, continued attention to low carb diet, regular exercise routine  Continue high dose STATIN with Lipitor 80 + Zetia 10 to achieve LDL goal < 70, ASA 81 mg daily for secondary prevention.   BP controlled on Cozaar 100/ Amlodipine 10./ COREG 25 BID, CHLORTHALIDONE 25 (+ K= supplement)  Continue cardiology follow-up nausea recently seen by Dr. Cox and had an unremarkable SPECT stress test 2/11/22   Repeat A1c, follow-up in 4 months.     GERD:  Controlled on daily PPI, ppi labs monitored.  Previously advised cutting back on alcohol to 1 drink daily to help with reflux management and weight loss.     IRREGULAR MENSES:  Cycle more recently regular, no hot flashes and FSH in premenopausal range, she will monitor for symptoms.    Patient Instructions   ADVISE LOOKING INTO NEW 2-PART, NON-LIVE SHINGRIX SHINGLES VACCINE THROUGH YOUR LOCAL PHARMACY.     ADVISE FALL COVID-19 BOOSTER-- MORE VARIANT SPECIFIC        Follow up in about 4 months (around 12/15/2022) for to follow up on lab results, return as needed for new  concerns.

## 2022-08-15 ENCOUNTER — OFFICE VISIT (OUTPATIENT)
Dept: FAMILY MEDICINE | Facility: CLINIC | Age: 52
End: 2022-08-15
Payer: OTHER GOVERNMENT

## 2022-08-15 VITALS
BODY MASS INDEX: 36.29 KG/M2 | WEIGHT: 231.25 LBS | HEIGHT: 67 IN | SYSTOLIC BLOOD PRESSURE: 126 MMHG | DIASTOLIC BLOOD PRESSURE: 84 MMHG | OXYGEN SATURATION: 98 % | HEART RATE: 72 BPM

## 2022-08-15 DIAGNOSIS — Z79.899 MEDICATION MANAGEMENT: ICD-10-CM

## 2022-08-15 DIAGNOSIS — Z23 NEED FOR SHINGLES VACCINE: ICD-10-CM

## 2022-08-15 DIAGNOSIS — Z12.31 ENCOUNTER FOR SCREENING MAMMOGRAM FOR BREAST CANCER: ICD-10-CM

## 2022-08-15 DIAGNOSIS — I10 ESSENTIAL HYPERTENSION: ICD-10-CM

## 2022-08-15 DIAGNOSIS — E55.9 VITAMIN D DEFICIENCY: ICD-10-CM

## 2022-08-15 DIAGNOSIS — R73.03 PREDIABETES: Primary | ICD-10-CM

## 2022-08-15 DIAGNOSIS — E66.01 CLASS 2 SEVERE OBESITY DUE TO EXCESS CALORIES WITH SERIOUS COMORBIDITY AND BODY MASS INDEX (BMI) OF 36.0 TO 36.9 IN ADULT: ICD-10-CM

## 2022-08-15 DIAGNOSIS — I25.10 CORONARY ARTERY DISEASE INVOLVING NATIVE CORONARY ARTERY OF NATIVE HEART WITHOUT ANGINA PECTORIS: ICD-10-CM

## 2022-08-15 DIAGNOSIS — J30.9 ALLERGIC RHINITIS, UNSPECIFIED SEASONALITY, UNSPECIFIED TRIGGER: ICD-10-CM

## 2022-08-15 PROCEDURE — 99999 PR PBB SHADOW E&M-EST. PATIENT-LVL IV: ICD-10-PCS | Mod: PBBFAC,,, | Performed by: FAMILY MEDICINE

## 2022-08-15 PROCEDURE — 99214 OFFICE O/P EST MOD 30 MIN: CPT | Mod: S$PBB,,, | Performed by: FAMILY MEDICINE

## 2022-08-15 PROCEDURE — 99214 PR OFFICE/OUTPT VISIT, EST, LEVL IV, 30-39 MIN: ICD-10-PCS | Mod: S$PBB,,, | Performed by: FAMILY MEDICINE

## 2022-08-15 PROCEDURE — 99999 PR PBB SHADOW E&M-EST. PATIENT-LVL IV: CPT | Mod: PBBFAC,,, | Performed by: FAMILY MEDICINE

## 2022-08-15 PROCEDURE — 99214 OFFICE O/P EST MOD 30 MIN: CPT | Mod: PBBFAC,PO | Performed by: FAMILY MEDICINE

## 2022-08-15 NOTE — PATIENT INSTRUCTIONS
ADVISE LOOKING INTO NEW 2-PART, NON-LIVE SHINGRIX SHINGLES VACCINE THROUGH YOUR LOCAL PHARMACY.     ADVISE FALL COVID-19 BOOSTER-- MORE VARIANT SPECIFIC

## 2022-09-27 ENCOUNTER — TELEPHONE (OUTPATIENT)
Dept: FAMILY MEDICINE | Facility: CLINIC | Age: 52
End: 2022-09-27
Payer: OTHER GOVERNMENT

## 2022-09-27 DIAGNOSIS — L98.9 UNKNOWN SKIN LESION: Primary | ICD-10-CM

## 2022-09-27 NOTE — TELEPHONE ENCOUNTER
----- Message from Guillermo Godfrey sent at 9/27/2022  4:05 PM CDT -----  Contact: pt  Type: Requesting to speak with nurse        Who Called: PT  Regarding: questions about a referral for dermatology through her insurance    Would the patient rather a call back or a response via [facility]? Call back  Best Call Back Number: [phone]  Additional Information:

## 2022-10-20 DIAGNOSIS — I25.10 CORONARY ARTERY DISEASE INVOLVING NATIVE CORONARY ARTERY OF NATIVE HEART WITHOUT ANGINA PECTORIS: ICD-10-CM

## 2022-10-20 RX ORDER — EZETIMIBE 10 MG/1
10 TABLET ORAL DAILY
Qty: 90 TABLET | Refills: 3 | Status: SHIPPED | OUTPATIENT
Start: 2022-10-20 | End: 2023-07-26 | Stop reason: SDUPTHER

## 2022-11-02 ENCOUNTER — OFFICE VISIT (OUTPATIENT)
Dept: FAMILY MEDICINE | Facility: CLINIC | Age: 52
End: 2022-11-02
Payer: OTHER GOVERNMENT

## 2022-11-02 VITALS
BODY MASS INDEX: 36.22 KG/M2 | HEART RATE: 84 BPM | TEMPERATURE: 100 F | DIASTOLIC BLOOD PRESSURE: 88 MMHG | HEIGHT: 67 IN | SYSTOLIC BLOOD PRESSURE: 122 MMHG | WEIGHT: 230.81 LBS | OXYGEN SATURATION: 98 %

## 2022-11-02 DIAGNOSIS — I25.10 CORONARY ARTERY DISEASE INVOLVING NATIVE CORONARY ARTERY OF NATIVE HEART WITHOUT ANGINA PECTORIS: ICD-10-CM

## 2022-11-02 DIAGNOSIS — Z12.31 SCREENING MAMMOGRAM FOR BREAST CANCER: ICD-10-CM

## 2022-11-02 DIAGNOSIS — Z79.82 LONG-TERM USE OF ASPIRIN THERAPY: ICD-10-CM

## 2022-11-02 DIAGNOSIS — Z95.5 STATUS POST CORONARY ARTERY STENT PLACEMENT: ICD-10-CM

## 2022-11-02 DIAGNOSIS — Z12.11 COLON CANCER SCREENING: ICD-10-CM

## 2022-11-02 DIAGNOSIS — I25.2 HISTORY OF NON-ST ELEVATION MYOCARDIAL INFARCTION (NSTEMI): ICD-10-CM

## 2022-11-02 DIAGNOSIS — Z23 NEED FOR SHINGLES VACCINE: ICD-10-CM

## 2022-11-02 DIAGNOSIS — I10 ESSENTIAL HYPERTENSION: ICD-10-CM

## 2022-11-02 DIAGNOSIS — R19.8 IRREGULAR BOWEL HABITS: ICD-10-CM

## 2022-11-02 DIAGNOSIS — R73.03 PREDIABETES: Primary | ICD-10-CM

## 2022-11-02 DIAGNOSIS — Z79.899 MEDICATION MANAGEMENT: ICD-10-CM

## 2022-11-02 DIAGNOSIS — E66.01 CLASS 2 SEVERE OBESITY DUE TO EXCESS CALORIES WITH SERIOUS COMORBIDITY AND BODY MASS INDEX (BMI) OF 36.0 TO 36.9 IN ADULT: ICD-10-CM

## 2022-11-02 DIAGNOSIS — E55.9 VITAMIN D DEFICIENCY: ICD-10-CM

## 2022-11-02 DIAGNOSIS — R10.30 LOWER ABDOMINAL PAIN: ICD-10-CM

## 2022-11-02 PROCEDURE — 99214 PR OFFICE/OUTPT VISIT, EST, LEVL IV, 30-39 MIN: ICD-10-PCS | Mod: S$PBB,,, | Performed by: FAMILY MEDICINE

## 2022-11-02 PROCEDURE — 99999 PR PBB SHADOW E&M-EST. PATIENT-LVL V: CPT | Mod: PBBFAC,,, | Performed by: FAMILY MEDICINE

## 2022-11-02 PROCEDURE — 99215 OFFICE O/P EST HI 40 MIN: CPT | Mod: PBBFAC,PO | Performed by: FAMILY MEDICINE

## 2022-11-02 PROCEDURE — 99214 OFFICE O/P EST MOD 30 MIN: CPT | Mod: S$PBB,,, | Performed by: FAMILY MEDICINE

## 2022-11-02 PROCEDURE — 99999 PR PBB SHADOW E&M-EST. PATIENT-LVL V: ICD-10-PCS | Mod: PBBFAC,,, | Performed by: FAMILY MEDICINE

## 2022-11-02 NOTE — PATIENT INSTRUCTIONS
CITRUCEL ORANGE POWDER (NOT SUGAR FREE AS ARTIFICIAL SWEETENERS CAN WORSEN GAS/BLOATING):  MIX 1 HEAPING TABLESPOON WITH 10 OZ OF WATER AND DRINK NIGHTLY AFTER DINNER. ADD A SECOND DOSE AFTER BREAKFAST IF NEEDED.     INCREASE HYDRATION TO TO 4 16 OZ BOTTLE OF WATER DAILY    DECREASE METFORMIN TO 500xr DAILY DOWN FROM 1000    START ALIGN PROBIOTIC DAILY TO HELP IMPROVE DIGESTIVE BACTERIAL BALANCE.     An order for a colonoscopy has been placed. If you have not been contacted within 5-7 days to schedule this procedure, please call GI scheduling at [phone]

## 2022-11-02 NOTE — LETTER
November 2, 2022      [facility]  [address]  [place] [address]  Phone: [phone]  Fax: [phone]       Patient: Lizet Palencia   YOB: 1970  Date of Visit: 11/02/2022    To Whom It May Concern:    Rohan Palencia  was at [facility] on 11/02/2022. The patient may return to work/school on 11/04/2022 with no restrictions. If you have any questions or concerns, or if I can be of further assistance, please do not hesitate to contact me.    Sincerely,    Moy Castle MA

## 2022-11-02 NOTE — PROGRESS NOTES
Office Visit    Patient Name: Lizet Palencia    : 1970  MRN: [id number]    Subjective:  Lizet is a 52 y.o. female who presents today for:    Follow-up (Pt stated she thinks the medication is giving stomach pains and diarrhea  )    Lizet Palencia presents today for ACUTE CARE VISIT TO DISCUSS METFORMIN SIDE EFFECTS/ GI CONCERNS.   Most recent OV 8/15/22, annual 22.  Metformin 2 500 XR TABS daily started for mgmt of of prediabetes and weight gain.    Chronic conditions include CAD w/ h/o stent/NSTEMI, HTN, prediabetes(A1c 6.3 22), HLD, Obesity.   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  She likes her job but it is sedentary.  She is in school and has 1 more semester for her masters of social work-- studying is sedentary.      She is perimenopausal.  No hot flashes but some increased cycle irregularity.    She previously saw a gynecologist and is up to date with pap-- pap/HPV w/ me 21.  FSH 22 5.7      Started Metformin 1000XR daily and initially reported no side effects but had trouble with consistency because of timing with with meals.    REPEAT LABS 22 WITH NORMAL CMP, IMPROVEMENT IN A1C FROM 6.3-->6.1.     Today she reports struggling with alternating diarrhea and constipation associated with abdominal cramping.    Having some nausea and decreased appetite. No dysphagia but some epigastric fullness.   Eating less and drinking less alcohol because of these symptoms but has not lost any weight- weight is very stable at 230 over the last 6 months.     General lifestyle habits are as follows:   Diet: fair- eats out several nights of the week, but she tries to make healthier low carb choices that include salads and seafood /lean meat & working on drinking more water. Has generally cut out alcohol.   Exercise: poor - recently very sedentary, not exercising and sitting with her school work and job.  Weight: recently STABLE at BMI 36.      Immunizations: declines FLU  shot, TDaP 10/25/2017, COVID-19 VACCINE SERIES COMPLETED 2/3/21 w/ Pfizer BOOSTER 12/21/22, SHINGRIX ADVISED     Screening Tests: mammogram 12/3/21 -- repeat 1 year & Scheduled for 12/14/22,  PAP/HPV WNL/(-) 4/28/21- REPEAT 5 YRS, HIV (-) 3/16/20, Hep C (-) 3/16/20, C-SCOPE ORDERED but DECLINED & FIT (-) 11/11/21-- REPEAT ORDER PLACED TODAY     Eye/Dental: reports up to date with both      Follows with cardiology now Dr Cox- most recently seen 2/2/22- F/U  6 months.  CAD s/p PCI LCX to NSTEMI in 2015 with 3.5 x 15 resolute ALDA, [facility] in 2016 showed patent stent  - ECHO 1/2015 EF 60-65%  -SPECT STRESS 2/11/22: Scintigraphically negative for ischemia or infarct       PAST MEDICAL HISTORY, SURGICAL/SOCIAL/FAMILY HISTORY REVIEWED AS PER CHART, WITH PERTINENT FINDINGS INCLUDED IN HISTORY SECTION OF NOTE.     Current Medications    Medication List with Changes/Refills   Current Medications    AMLODIPINE (NORVASC) 10 MG TABLET    Take 1 tablet (10 mg total) by mouth once daily.    ASPIRIN 81 MG CHEW    Take 81 mg by mouth once daily.    ATORVASTATIN (LIPITOR) 80 MG TABLET    Take 1 tablet (80 mg total) by mouth once daily.    CARVEDILOL (COREG) 25 MG TABLET    TAKE 1 TABLET BY MOUTH TWICE DAILY WITH MEAL    CETIRIZINE (ZYRTEC) 10 MG TABLET    Take 1 tablet (10 mg total) by mouth once daily.    CHLORTHALIDONE (HYGROTEN) 25 MG TAB    Take 1 tablet (25 mg total) by mouth once daily.    EZETIMIBE (ZETIA) 10 MG TABLET    Take 1 tablet (10 mg total) by mouth once daily.    FLUTICASONE PROPIONATE (FLONASE) 50 MCG/ACTUATION NASAL SPRAY    2 sprays (100 mcg total) by Each Nostril route once daily.    FLUTICASONE PROPIONATE (FLONASE) 50 MCG/ACTUATION NASAL SPRAY    2 sprays (100 mcg total) by Each Nostril route once daily.    FLUTICASONE PROPIONATE (FLONASE) 50 MCG/ACTUATION NASAL SPRAY    1 spray (50 mcg total) by Each Nostril route once daily.    LOSARTAN (COZAAR) 100 MG TABLET    Take 1 tablet (100 mg total) by mouth once daily.  "   METFORMIN (GLUCOPHAGE-XR) 500 MG ER 24HR TABLET    Take 2 tablets (1,000 mg total) by mouth daily with dinner or evening meal.    OMEPRAZOLE (PRILOSEC) 20 MG CAPSULE    Take 1 capsule (20 mg total) by mouth once daily. for 14 days    POTASSIUM CHLORIDE (K-TAB) 20 MEQ    Take 1 tablet (20 mEq total) by mouth once daily.    TRIAMCINOLONE ACETONIDE TOP           Allergies   Review of patient's allergies indicates:   Allergen Reactions    Crab Rash    Shrimp Rash         Review of Systems (Pertinent positives)  Review of Systems   Constitutional:  Negative for fever and unexpected weight change.   Respiratory:  Negative for shortness of breath.    Cardiovascular:  Negative for chest pain.   Gastrointestinal:  Positive for abdominal distention, abdominal pain, constipation, diarrhea and nausea. Negative for blood in stool and vomiting.     /88 (BP Location: Right arm, Patient Position: Sitting, BP Method: Large (Manual))   Pulse 84   Temp 99.5 °F (37.5 °C) (Oral)   Ht 5' 7" (1.702 m)   Wt 104.7 kg (230 lb 13.2 oz)   SpO2 98%   BMI 36.15 kg/m²     Physical Exam  Vitals reviewed.   Constitutional:       General: She is not in acute distress.     Appearance: Normal appearance. She is well-developed.   HENT:      Head: Normocephalic and atraumatic.   Eyes:      Conjunctiva/sclera: Conjunctivae normal.   Cardiovascular:      Rate and Rhythm: Normal rate and regular rhythm.   Pulmonary:      Effort: Pulmonary effort is normal.      Breath sounds: Normal breath sounds.   Abdominal:      General: Bowel sounds are normal. There is no distension.      Palpations: Abdomen is soft.      Tenderness: There is abdominal tenderness (mild lower abdominal tenderness) in the right lower quadrant, periumbilical area, suprapubic area and left lower quadrant. There is no guarding or rebound.   Musculoskeletal:      Right lower leg: No edema.      Left lower leg: No edema.   Skin:     General: Skin is warm and dry. "   Neurological:      General: No focal deficit present.      Mental Status: She is alert and oriented to person, place, and time.   Psychiatric:         Mood and Affect: Mood normal.         Behavior: Behavior normal.         Assessment/Plan:  Lizet Palencia is a 52 y.o. female who presents today for :        ICD-10-CM ICD-9-CM    1. Prediabetes  R73.03 790.29       2. Coronary artery disease involving native coronary artery of native heart without angina pectoris  I25.10 414.01       3. Essential hypertension  I10 401.9       4. History of non-ST elevation myocardial infarction (NSTEMI)  I25.2 412       5. Status post coronary artery stent placement  Z95.5 V45.82       6. Long-term use of aspirin therapy  Z79.82 V58.66       7. Class 2 severe obesity due to excess calories with serious comorbidity and body mass index (BMI) of 36.0 to 36.9 in adult  E66.01 278.01     Z68.36 V85.36       8. Vitamin D deficiency  E55.9 268.9       9. Medication management  Z79.899 V58.69       10. Need for shingles vaccine  Z23 V04.89       11. Colon cancer screening  Z12.11 V76.51 Case Request Endoscopy: COLONOSCOPY      12. Screening mammogram for breast cancer  Z12.31 V76.12            Pfizer BOOSTER 12/21/22- BiVALENT BOOSTER ADVISED, SHINGRIX ADVISED, FIT (-) 11/11/21 *COLONOSCOPY ORDERED, mammo due 12/2022-- SCHEDULED    IRREGULAR BOWEL HABITS/ LOW ABDOMINAL PAIN/ NAUSEA: May be side effects of Metformin, so advised CUTTING BACK TO  XR METFORMIN DAILY. NEEDS COLONOSCOPY-- ORDERED. ADVISED ON DAILY FIBER SUPPLEMENT AND IMPORTANCE OF HYDRATION AND REGULAR EXERCISE FOR GUT HEALTH. ADVISED ON DAILY ALIGN PROBIOTIC. FOLLOW UP ON THE ABOVE AT FOLLOW UP IN 6 WEEKS.     Coronary artery disease status post NSTEMI, stent placement with underlying obesity, hypertension, hyperlipidemia, prediabetes: previously put on about 10 lbs with increase in A1c to 6.3. A1C IMPROVED TO 6.1 WITH STARTING METFORMIN 1000XR DAILY.  DECREASE  METFORMIN TO 500XR DAILY GIVEN ABOVE GI CONCERNS. continued attention to low carb diet, regular exercise routine-- HAS REPEAT A1C SCHEDULED 12/15/22.   Continue high dose STATIN with Lipitor 80 + Zetia 10 to achieve LDL goal < 70, ASA 81 mg daily for secondary prevention.   BP controlled on Cozaar 100/ Amlodipine 10./ COREG 25 BID, CHLORTHALIDONE 25 (+ K= supplement)  Continue cardiology follow-up nausea recently seen by Dr. Cox and had an unremarkable SPECT stress test 2/11/22     GERD:  Controlled on daily PPI, ppi labs monitored. Recently cut back on alcohol due to GI concerns     IRREGULAR MENSES:  Cycles mildly irregular, no hot flashes and FSH in premenopausal range, she will monitor for symptoms. Discussed the metabolic implications of menopause and how women often find it more challenging to loose/ maintain weight due to loss of muscle mass/ decreased metabolic rate.        Patient Instructions   CITRUCEL ORANGE POWDER (NOT SUGAR FREE AS ARTIFICIAL SWEETENERS CAN WORSEN GAS/BLOATING):  MIX 1 HEAPING TABLESPOON WITH 10 OZ OF WATER AND DRINK NIGHTLY AFTER DINNER. ADD A SECOND DOSE AFTER BREAKFAST IF NEEDED.     INCREASE HYDRATION TO TO 4 16 OZ BOTTLE OF WATER DAILY    DECREASE METFORMIN TO 500xr DAILY DOWN FROM 1000    START ALIGN PROBIOTIC DAILY TO HELP IMPROVE DIGESTIVE BACTERIAL BALANCE.     An order for a colonoscopy has been placed. If you have not been contacted within 5-7 days to schedule this procedure, please call GI scheduling at [phone]            Follow up in 6 weeks (on 12/14/2022) for to follow up on lab results, return as needed for new concerns.

## 2022-11-02 NOTE — LETTER
November 2, 2022      [facility]  [address]  [place] [address]  Phone: [phone]  Fax: [phone]       Patient: Lizet Palencia   YOB: 1970  Date of Visit: 11/02/2022    To Whom It May Concern:    Rohan Palencia  was at [facility] on 11/02/2022. The patient may return to work/school on 11/04/2022 with no  restrictions. If you have any questions or concerns, or if I can be of further assistance, please do not hesitate to contact me.    Sincerely,    Moy Castle MA

## 2022-11-07 ENCOUNTER — TELEPHONE (OUTPATIENT)
Dept: ENDOSCOPY | Facility: HOSPITAL | Age: 52
End: 2022-11-07
Payer: OTHER GOVERNMENT

## 2022-11-07 NOTE — TELEPHONE ENCOUNTER
Attempted to contact Lizet Palencia  to schedule procedure , no answer, left message with call back number
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
## 2022-11-29 ENCOUNTER — OFFICE VISIT (OUTPATIENT)
Dept: DERMATOLOGY | Facility: CLINIC | Age: 52
End: 2022-11-29
Payer: OTHER GOVERNMENT

## 2022-11-29 DIAGNOSIS — L81.9 HYPOPIGMENTATION: Primary | ICD-10-CM

## 2022-11-29 DIAGNOSIS — L21.9 SEBORRHEIC DERMATITIS: ICD-10-CM

## 2022-11-29 DIAGNOSIS — Z76.89 ENCOUNTER FOR SKIN CARE: ICD-10-CM

## 2022-11-29 DIAGNOSIS — L98.9 UNKNOWN SKIN LESION: ICD-10-CM

## 2022-11-29 PROCEDURE — 99204 PR OFFICE/OUTPT VISIT, NEW, LEVL IV, 45-59 MIN: ICD-10-PCS | Mod: S$PBB,,, | Performed by: DERMATOLOGY

## 2022-11-29 PROCEDURE — 99214 OFFICE O/P EST MOD 30 MIN: CPT | Mod: PBBFAC,PN | Performed by: DERMATOLOGY

## 2022-11-29 PROCEDURE — 99999 PR PBB SHADOW E&M-EST. PATIENT-LVL IV: ICD-10-PCS | Mod: PBBFAC,,, | Performed by: DERMATOLOGY

## 2022-11-29 PROCEDURE — 99999 PR PBB SHADOW E&M-EST. PATIENT-LVL IV: CPT | Mod: PBBFAC,,, | Performed by: DERMATOLOGY

## 2022-11-29 PROCEDURE — 99204 OFFICE O/P NEW MOD 45 MIN: CPT | Mod: S$PBB,,, | Performed by: DERMATOLOGY

## 2022-11-29 RX ORDER — KETOCONAZOLE 20 MG/G
CREAM TOPICAL 2 TIMES DAILY
Qty: 45 G | Refills: 2 | Status: SHIPPED | OUTPATIENT
Start: 2022-11-29 | End: 2024-03-14 | Stop reason: SDUPTHER

## 2022-12-01 ENCOUNTER — TELEPHONE (OUTPATIENT)
Dept: ENDOSCOPY | Facility: HOSPITAL | Age: 52
End: 2022-12-01
Payer: OTHER GOVERNMENT

## 2022-12-01 RX ORDER — POLYETHYLENE GLYCOL 3350, SODIUM SULFATE ANHYDROUS, SODIUM BICARBONATE, SODIUM CHLORIDE, POTASSIUM CHLORIDE 236; 22.74; 6.74; 5.86; 2.97 G/4L; G/4L; G/4L; G/4L; G/4L
4 POWDER, FOR SOLUTION ORAL ONCE
Qty: 4000 ML | Refills: 0 | Status: SHIPPED | OUTPATIENT
Start: 2022-12-01 | End: 2022-12-01

## 2022-12-01 NOTE — TELEPHONE ENCOUNTER
Endoscopy Scheduling Questionnaire:         Are you taking any blood thinners? No               If Yes  Have you been on them for longer than one year?     2. Have you been diagnosed with Diverticulitis in past three months?  No    3. Are you on dialysis or have Kidney Disease? No    4. Previous Colonoscopy?  No         If yes Do you have a history of colon polyps?        6. Are you a diabetic?  No    7. Do you have a history of constipation?  No      Procedure scheduled with Dr. Strickland on  1/19/2023    The prep being used is Golytely     The patient's prep instructions were sent by [email or web address]

## 2022-12-14 ENCOUNTER — PATIENT OUTREACH (OUTPATIENT)
Dept: ADMINISTRATIVE | Facility: HOSPITAL | Age: 52
End: 2022-12-14
Payer: OTHER GOVERNMENT

## 2022-12-14 NOTE — PROGRESS NOTES
Subjective:   @Patient ID:  Lizet Palencia is a 52 y.o. female who presents for follow-up of CAD      HPI:   Patient is here for follow up  She has been doing okay except last week she had episodes of intermittent chest pain.  Symptoms lasted for 3 days.  The pain lasts for about 3 hours and goes away.  She denies recurrent symptoms for the last 2 days.  She thinks the pain has some similarity to the pain she had earlier this year and to the pain she had prior to her stents    She is compliant with her medication    Stress MPI earlier this year was negative for ischemia        Prior cardiovascular  Hx  --------------------------------  Stress MPI 2/11/2022  1. Scintigraphically negative for ischemia or infarct.  2. The global left ventricular systolic function is normal with an LV ejection fraction of greater than 75 % and no evidence of LV dilatation. Wall motion is normal.  CAD s/p PCI LCX to NSTEMI in 2015 with 3.5 x 15 resolute ALDA, [facility] in 2016 showed patent stent    EKG December 15, 2022 sinus rhythm diffuse nonspecific T-wave abnormalities no acute changes as compared to prior.  - ECHO 1/2015 EF 60-65%    Patient Active Problem List    Diagnosis Date Noted    Lateral pain of hip 01/25/2022    Decreased strength of lower extremity 01/25/2022    Acute left-sided low back pain with left-sided sciatica 01/24/2022    Ulnar nerve palsy of right upper extremity 09/22/2020    Vitamin D deficiency 03/02/2020    Essential hypertension 10/16/2019    Cubital tunnel syndrome on left 01/14/2019    Prediabetes 03/14/2018    Long-term use of aspirin therapy 10/25/2017    Diuretic-induced hypokalemia 07/02/2016    History of non-ST elevation myocardial infarction (NSTEMI) 07/02/2016    Coronary artery disease involving native coronary artery of native heart without angina pectoris 07/02/2016    Hepatitis C antibody test positive 05/04/2016     Negative HCV RNA 05/2016, will check again in 3 months but no detectable HCV  virus  HCV AB will likely remain positive for life  HCV RNA again undetectable 10/2/17      Plantar fasciitis 10/22/2015     stretching exercises demonstrated, recommended foot wear fitting-- good arch support      Status post coronary artery stent placement 01/19/2015     LCx stent 1/57972      Allergic rhinitis 01/03/2015    Obesity due to excess calories with serious comorbidity 01/02/2015    Accelerated hypertension 01/01/2015    Uterine fibroid 09/09/2013                    LAST HbA1c  Lab Results   Component Value Date    HGBA1C 5.9 (H) 12/15/2022       Lipid panel  Lab Results   Component Value Date    CHOL 144 12/15/2022    CHOL 123 05/09/2022    CHOL 129 09/10/2021     Lab Results   Component Value Date    HDL 46 12/15/2022    HDL 41 05/09/2022    HDL 43 09/10/2021     Lab Results   Component Value Date    LDLCALC 90.6 12/15/2022    LDLCALC 74.0 05/09/2022    LDLCALC 73.6 09/10/2021     Lab Results   Component Value Date    TRIG 37 12/15/2022    TRIG 40 05/09/2022    TRIG 62 09/10/2021     Lab Results   Component Value Date    CHOLHDL 31.9 12/15/2022    CHOLHDL 33.3 05/09/2022    CHOLHDL 33.3 09/10/2021            Review of Systems   Constitutional: Negative for chills and fever.   HENT:  Negative for hearing loss and nosebleeds.    Eyes:  Negative for blurred vision.   Cardiovascular:  Negative for chest pain and palpitations.   Respiratory:  Negative for cough, hemoptysis and shortness of breath.    Hematologic/Lymphatic: Negative for bleeding problem.   Skin:  Negative for itching.   Musculoskeletal:  Negative for falls.   Gastrointestinal:  Negative for abdominal pain and hematochezia.   Genitourinary:  Negative for hematuria.   Neurological:  Negative for dizziness and loss of balance.   Psychiatric/Behavioral:  Negative for altered mental status and depression.      Objective:   Physical Exam  Constitutional:       Appearance: She is well-developed.   HENT:      Head: Normocephalic and atraumatic.    Eyes:      Conjunctiva/sclera: Conjunctivae normal.   Neck:      Vascular: No carotid bruit.   Cardiovascular:      Rate and Rhythm: Normal rate and regular rhythm.      Heart sounds: Normal heart sounds. No murmur heard.    No friction rub. No gallop.   Pulmonary:      Effort: Pulmonary effort is normal. No respiratory distress.      Breath sounds: Normal breath sounds. No stridor. No wheezing.   Musculoskeletal:      Cervical back: Neck supple.   Skin:     General: Skin is warm and dry.   Neurological:      Mental Status: She is alert and oriented to person, place, and time.   Psychiatric:         Behavior: Behavior normal.       Assessment:     1. Status post coronary artery stent placement    2. History of non-ST elevation myocardial infarction (NSTEMI)    3. Essential hypertension    4. Other chest pain        Plan:   - Continue GDMT with ASA and Statin  -  stress MPI this year with no ischemia.  - will add nitroglycerin p.r.n..  We will follow-up after 4 weeks.  If continues to have recurrent symptoms then we will consider coronary angiogram  - Target LDL < 70 ,  Continue Zetial and Lipitor 80 mg qhs We.  We discussed PCSK 9 inhibitors. At this time would stick with current tx and lifestyle changes.     - WT loss and life style modification discussed with the patient.        I spent 5-10 minutes asking, assessing, assisting, arranging and advising heart healthy diet improvements. This included low-salt meals, portion control and health food alternatives. I also encourage 30 minutes of moderate exercise 3-4x a week.       EKG reviewed no acute ST changes.    Continue with current medical plan and lifestyle changes.  Return sooner for concerns or questions. If symptoms persist go to the ED  I have reviewed all pertinent data including patient's medical history in detail and updated the computerized patient record.     No orders of the defined types were placed in this encounter.      Follow up as scheduled.      She expressed verbal understanding and agreed with the plan    Patient's Medications   New Prescriptions    NITROGLYCERIN (NITROSTAT) 0.4 MG SL TABLET    Place 1 tablet (0.4 mg total) under the tongue every 5 (five) minutes as needed for Chest pain.   Previous Medications    AMLODIPINE (NORVASC) 10 MG TABLET    Take 1 tablet (10 mg total) by mouth once daily.    ASPIRIN 81 MG CHEW    Take 81 mg by mouth once daily.    ATORVASTATIN (LIPITOR) 80 MG TABLET    Take 1 tablet (80 mg total) by mouth once daily.    CARVEDILOL (COREG) 25 MG TABLET    TAKE 1 TABLET BY MOUTH TWICE DAILY WITH MEAL    CETIRIZINE (ZYRTEC) 10 MG TABLET    Take 1 tablet (10 mg total) by mouth once daily.    CHLORTHALIDONE (HYGROTEN) 25 MG TAB    Take 1 tablet (25 mg total) by mouth once daily.    EZETIMIBE (ZETIA) 10 MG TABLET    Take 1 tablet (10 mg total) by mouth once daily.    FLUTICASONE PROPIONATE (FLONASE) 50 MCG/ACTUATION NASAL SPRAY    2 sprays (100 mcg total) by Each Nostril route once daily.    FLUTICASONE PROPIONATE (FLONASE) 50 MCG/ACTUATION NASAL SPRAY    2 sprays (100 mcg total) by Each Nostril route once daily.    FLUTICASONE PROPIONATE (FLONASE) 50 MCG/ACTUATION NASAL SPRAY    1 spray (50 mcg total) by Each Nostril route once daily.    KETOCONAZOLE (NIZORAL) 2 % CREAM    Apply topically 2 (two) times daily. Prn facial rash or dryness    LOSARTAN (COZAAR) 100 MG TABLET    Take 1 tablet (100 mg total) by mouth once daily.    METFORMIN (GLUCOPHAGE-XR) 500 MG ER 24HR TABLET    Take 2 tablets (1,000 mg total) by mouth daily with dinner or evening meal.    OMEPRAZOLE (PRILOSEC) 20 MG CAPSULE    Take 1 capsule (20 mg total) by mouth once daily. for 14 days    POTASSIUM CHLORIDE (K-TAB) 20 MEQ    Take 1 tablet (20 mEq total) by mouth once daily.    TRIAMCINOLONE ACETONIDE TOP       Modified Medications    No medications on file   Discontinued Medications    No medications on file

## 2022-12-14 NOTE — PROGRESS NOTES
2022 Care Everywhere updates requested and reviewed.  Immunizations reconciled. Media reports reviewed.  Duplicate HM overrides and  orders removed.  Overdue HM topic chart audit and/or requested.  Overdue lab testing linked to upcoming lab appointments if applies.        Health Maintenance Due   Topic Date Due    Shingles Vaccine (1 of 2) Never done    COVID-19 Vaccine (4 - Booster for Pfizer series) 02/15/2022    Influenza Vaccine (1) 2022    Colorectal Cancer Screening  2022    Mammogram  2022

## 2022-12-15 ENCOUNTER — LAB VISIT (OUTPATIENT)
Dept: LAB | Facility: HOSPITAL | Age: 52
End: 2022-12-15
Attending: FAMILY MEDICINE
Payer: OTHER GOVERNMENT

## 2022-12-15 ENCOUNTER — OFFICE VISIT (OUTPATIENT)
Dept: CARDIOLOGY | Facility: CLINIC | Age: 52
End: 2022-12-15
Payer: OTHER GOVERNMENT

## 2022-12-15 VITALS
OXYGEN SATURATION: 99 % | DIASTOLIC BLOOD PRESSURE: 85 MMHG | WEIGHT: 227.06 LBS | HEART RATE: 92 BPM | BODY MASS INDEX: 35.64 KG/M2 | SYSTOLIC BLOOD PRESSURE: 137 MMHG | HEIGHT: 67 IN

## 2022-12-15 DIAGNOSIS — R73.03 PREDIABETES: ICD-10-CM

## 2022-12-15 DIAGNOSIS — I10 ESSENTIAL HYPERTENSION: ICD-10-CM

## 2022-12-15 DIAGNOSIS — E55.9 VITAMIN D DEFICIENCY: ICD-10-CM

## 2022-12-15 DIAGNOSIS — I25.2 HISTORY OF NON-ST ELEVATION MYOCARDIAL INFARCTION (NSTEMI): ICD-10-CM

## 2022-12-15 DIAGNOSIS — R07.89 OTHER CHEST PAIN: ICD-10-CM

## 2022-12-15 DIAGNOSIS — Z95.5 STATUS POST CORONARY ARTERY STENT PLACEMENT: Primary | ICD-10-CM

## 2022-12-15 DIAGNOSIS — Z79.899 MEDICATION MANAGEMENT: ICD-10-CM

## 2022-12-15 LAB
25(OH)D3+25(OH)D2 SERPL-MCNC: 29 NG/ML (ref 30–96)
ALBUMIN SERPL BCP-MCNC: 3.5 G/DL (ref 3.5–5.2)
ALP SERPL-CCNC: 44 U/L (ref 55–135)
ALT SERPL W/O P-5'-P-CCNC: 11 U/L (ref 10–44)
ANION GAP SERPL CALC-SCNC: 5 MMOL/L (ref 8–16)
AST SERPL-CCNC: 14 U/L (ref 10–40)
BILIRUB SERPL-MCNC: 0.7 MG/DL (ref 0.1–1)
BUN SERPL-MCNC: 16 MG/DL (ref 6–20)
CALCIUM SERPL-MCNC: 9.7 MG/DL (ref 8.7–10.5)
CHLORIDE SERPL-SCNC: 102 MMOL/L (ref 95–110)
CHOLEST SERPL-MCNC: 144 MG/DL (ref 120–199)
CHOLEST/HDLC SERPL: 3.1 {RATIO} (ref 2–5)
CO2 SERPL-SCNC: 29 MMOL/L (ref 23–29)
CREAT SERPL-MCNC: 0.7 MG/DL (ref 0.5–1.4)
EST. GFR  (NO RACE VARIABLE): >60 ML/MIN/1.73 M^2
ESTIMATED AVG GLUCOSE: 123 MG/DL (ref 68–131)
GLUCOSE SERPL-MCNC: 112 MG/DL (ref 70–110)
HBA1C MFR BLD: 5.9 % (ref 4–5.6)
HDLC SERPL-MCNC: 46 MG/DL (ref 40–75)
HDLC SERPL: 31.9 % (ref 20–50)
LDLC SERPL CALC-MCNC: 90.6 MG/DL (ref 63–159)
NONHDLC SERPL-MCNC: 98 MG/DL
POTASSIUM SERPL-SCNC: 3.7 MMOL/L (ref 3.5–5.1)
PROT SERPL-MCNC: 6.8 G/DL (ref 6–8.4)
SODIUM SERPL-SCNC: 136 MMOL/L (ref 136–145)
TRIGL SERPL-MCNC: 37 MG/DL (ref 30–150)

## 2022-12-15 PROCEDURE — 82306 VITAMIN D 25 HYDROXY: CPT | Performed by: FAMILY MEDICINE

## 2022-12-15 PROCEDURE — 83036 HEMOGLOBIN GLYCOSYLATED A1C: CPT | Performed by: FAMILY MEDICINE

## 2022-12-15 PROCEDURE — 99999 PR PBB SHADOW E&M-EST. PATIENT-LVL III: CPT | Mod: PBBFAC,,, | Performed by: INTERNAL MEDICINE

## 2022-12-15 PROCEDURE — 36415 COLL VENOUS BLD VENIPUNCTURE: CPT | Mod: PO | Performed by: FAMILY MEDICINE

## 2022-12-15 PROCEDURE — 80053 COMPREHEN METABOLIC PANEL: CPT | Performed by: FAMILY MEDICINE

## 2022-12-15 PROCEDURE — 99214 OFFICE O/P EST MOD 30 MIN: CPT | Mod: S$PBB,,, | Performed by: INTERNAL MEDICINE

## 2022-12-15 PROCEDURE — 99214 PR OFFICE/OUTPT VISIT, EST, LEVL IV, 30-39 MIN: ICD-10-PCS | Mod: S$PBB,,, | Performed by: INTERNAL MEDICINE

## 2022-12-15 PROCEDURE — 80061 LIPID PANEL: CPT | Performed by: FAMILY MEDICINE

## 2022-12-15 PROCEDURE — 99213 OFFICE O/P EST LOW 20 MIN: CPT | Mod: PBBFAC,PO | Performed by: INTERNAL MEDICINE

## 2022-12-15 PROCEDURE — 99999 PR PBB SHADOW E&M-EST. PATIENT-LVL III: ICD-10-PCS | Mod: PBBFAC,,, | Performed by: INTERNAL MEDICINE

## 2022-12-15 RX ORDER — NITROGLYCERIN 0.4 MG/1
0.4 TABLET SUBLINGUAL EVERY 5 MIN PRN
Qty: 20 TABLET | Refills: 12 | Status: SHIPPED | OUTPATIENT
Start: 2022-12-15 | End: 2024-01-29

## 2022-12-23 ENCOUNTER — TELEPHONE (OUTPATIENT)
Dept: FAMILY MEDICINE | Facility: CLINIC | Age: 52
End: 2022-12-23
Payer: OTHER GOVERNMENT

## 2022-12-23 NOTE — TELEPHONE ENCOUNTER
----- Message from Moy Castle MA sent at 12/22/2022  4:54 PM CST -----  Contact: pt    ----- Message -----  From: Gerber Navarro  Sent: 12/22/2022  12:13 PM CST  To: Missy JOVEL Staff    .Type:  Test Results    Who Called: pt  Name of Test (Lab/Mammo/Etc):  Mammogram  Date of Test:   Ordering Provider:   Where the test was performed: [facility]  Would the patient rather a call back or a response via [facility]?  Call back  Best Call Back Number:[phone]   Additional Information:   Pt Is requesting a call back to discuss her mammogram results from [facility].

## 2022-12-23 NOTE — TELEPHONE ENCOUNTER
Called pt about her message for her mammogram. Pt stated it was 2 weeks ago and was wondering if we had the results. I told pt we have not received the results. I asked her if she had a copy. She does not. I asked her if she could go get a copy. She could not. Will call Tuesday to get the results.

## 2023-01-17 ENCOUNTER — OFFICE VISIT (OUTPATIENT)
Dept: FAMILY MEDICINE | Facility: CLINIC | Age: 53
End: 2023-01-17
Payer: OTHER GOVERNMENT

## 2023-01-17 ENCOUNTER — TELEPHONE (OUTPATIENT)
Dept: ENDOSCOPY | Facility: HOSPITAL | Age: 53
End: 2023-01-17
Payer: OTHER GOVERNMENT

## 2023-01-17 ENCOUNTER — PATIENT MESSAGE (OUTPATIENT)
Dept: ADMINISTRATIVE | Facility: HOSPITAL | Age: 53
End: 2023-01-17
Payer: OTHER GOVERNMENT

## 2023-01-17 DIAGNOSIS — R10.9 RIGHT FLANK PAIN: ICD-10-CM

## 2023-01-17 DIAGNOSIS — I25.10 CORONARY ARTERY DISEASE INVOLVING NATIVE CORONARY ARTERY OF NATIVE HEART WITHOUT ANGINA PECTORIS: Primary | ICD-10-CM

## 2023-01-17 DIAGNOSIS — E66.01 CLASS 2 SEVERE OBESITY DUE TO EXCESS CALORIES WITH SERIOUS COMORBIDITY AND BODY MASS INDEX (BMI) OF 35.0 TO 35.9 IN ADULT: ICD-10-CM

## 2023-01-17 DIAGNOSIS — R73.03 PREDIABETES: ICD-10-CM

## 2023-01-17 PROCEDURE — 99214 PR OFFICE/OUTPT VISIT, EST, LEVL IV, 30-39 MIN: ICD-10-PCS | Mod: 95,,, | Performed by: FAMILY MEDICINE

## 2023-01-17 PROCEDURE — 99214 OFFICE O/P EST MOD 30 MIN: CPT | Mod: 95,,, | Performed by: FAMILY MEDICINE

## 2023-01-17 RX ORDER — POLYETHYLENE GLYCOL 3350, SODIUM SULFATE ANHYDROUS, SODIUM BICARBONATE, SODIUM CHLORIDE, POTASSIUM CHLORIDE 236; 22.74; 6.74; 5.86; 2.97 G/4L; G/4L; G/4L; G/4L; G/4L
4 POWDER, FOR SOLUTION ORAL ONCE
Qty: 4000 ML | Refills: 0 | Status: SHIPPED | OUTPATIENT
Start: 2023-01-17 | End: 2023-01-17

## 2023-01-17 RX ORDER — SEMAGLUTIDE 0.25 MG/.5ML
0.25 INJECTION, SOLUTION SUBCUTANEOUS
Qty: 2 ML | Refills: 0 | Status: SHIPPED | OUTPATIENT
Start: 2023-01-17 | End: 2023-04-24 | Stop reason: ALTCHOICE

## 2023-01-17 RX ORDER — SEMAGLUTIDE 0.5 MG/.5ML
0.5 INJECTION, SOLUTION SUBCUTANEOUS
Qty: 2 ML | Refills: 2 | Status: SHIPPED | OUTPATIENT
Start: 2023-01-17 | End: 2023-04-24 | Stop reason: ALTCHOICE

## 2023-01-17 NOTE — PROGRESS NOTES
Office Visit    Patient Name: Lizet Palencia    : 1970  MRN: [id number]    Subjective:  Lizet is a 52 y.o. female who presents today for:    No chief complaint on file.    The patient location is:   The chief complaint leading to consultation is: follow up, labs, weight loss/prediabetes    Visit type: audiovisual    Face to Face time with patient: START 149   35 minutes of total time spent on the encounter, which includes face to face time and non-face to face time preparing to see the patient (eg, review of tests), Obtaining and/or reviewing separately obtained history, Documenting clinical information in the electronic or other health record, Independently interpreting results (not separately reported) and communicating results to the patient/family/caregiver, or Care coordination (not separately reported).     Each patient to whom he or she provides medical services by telemedicine is:  (1) informed of the relationship between the physician and patient and the respective role of any other health care provider with respect to management of the patient; and (2) notified that he or she may decline to receive medical services by telemedicine and may withdraw from such care at any time.    Notes:     Lizet Palencia is a 53 yo AA female patient of mine who presents for VV for follow up and lab review.   Most recent OV 22, annual 22.  Colonoscopy Scheduled 23    Chronic conditions include CAD w/ h/o stent/NSTEMI, HTN, prediabetes(A1c 6.3 22), HLD, Obesity.   She works for the [employer] in [place].  She likes her job but it is sedentary.  She is in school and for her masters of social work-- studying is sedentary.     She was previously prescribed Metformin 2 500 XR TABS daily for mgmt of of prediabetes and weight gain.  Had GI SEs and therefore was advised to cut back to 1 tab daily-- has been taking 500XR nightly inconsistently.   GI SE concerns have abated  though she recently has suffered with a waxing and waning right flank pain-- was much more bothersome about 3 weeks ago but has since decreased for unknown reasons.  No associated bowel or bladder concerns. Sits at desk and frequently reaches to the right for mousing, computer work, etc.     A1c 12/15/22 5.9, unremarkable CMP, LDL 90, Vit D 29 (inconsistent about supplement).      She was prescribed medications by  yesterday for URI symptoms that have been ongoing about 1 week.   She was prescribed Flonase, Tessalon Perles, Z Pack.   FLU/COVID (-)     General lifestyle habits are as follows:   Diet: fair- eats out several nights of the week, but she tries to make healthier low carb choices that include salads and seafood /lean meat & drinking more water. Has cut back significantly on alcohol.   Exercise: poor - recently very sedentary, not exercising and sitting with her school work and job. Hopes to start walking with a co-worker.   Weight: recently down a few lbs from BMI 36-->35     Immunizations: declines FLU shot, TDaP 10/25/2017, COVID-19 VACCINE SERIES COMPLETED 2/3/21 w/ Pfizer BOOSTER 12/21/22, SHINGRIX ADVISED     Screening Tests: mammogram 12/14/22-- repeat 1 year,  PAP/HPV WNL/(-) 4/28/21- REPEAT 5 YRS, HIV (-) 3/16/20, Hep C (-) 3/16/20, FIT (-) 11/11/21-- has colonoscopy scheduled 1/19/23.      Eye/Dental: reports up to date with both    Saw Dr Cox 12/15/22 & Has follow up appointment 1/26/23:  - Continue GDMT with ASA and Statin  -  stress MPI this year with no ischemia.  - will add nitroglycerin p.r.n..  We will follow-up after 4 weeks.  If continues to have recurrent symptoms then we will consider coronary angiogram  - Target LDL < 70 ,  Continue Zetial and Lipitor 80 mg qhs We.  We discussed PCSK 9 inhibitors. At this time would stick with current tx and lifestyle changes.    - WT loss and life style modification discussed with the patient.      She has not needed the Nitroglycerin.          PAST MEDICAL HISTORY, SURGICAL/SOCIAL/FAMILY HISTORY REVIEWED AS PER CHART, WITH PERTINENT FINDINGS INCLUDED IN HISTORY SECTION OF NOTE.     Current Medications    Medication List with Changes/Refills   New Medications    SEMAGLUTIDE, WEIGHT LOSS, (WEGOVY) 0.25 MG/0.5 ML PNIJ    Inject 0.25 mg into the skin every 7 days. After 4 weeks increase to 0.5 mg per week dose    SEMAGLUTIDE, WEIGHT LOSS, (WEGOVY) 0.5 MG/0.5 ML PNIJ    Inject 0.5 mg into the skin every 7 days.   Current Medications    AMLODIPINE (NORVASC) 10 MG TABLET    Take 1 tablet (10 mg total) by mouth once daily.    ASPIRIN 81 MG CHEW    Take 81 mg by mouth once daily.    ATORVASTATIN (LIPITOR) 80 MG TABLET    Take 1 tablet (80 mg total) by mouth once daily.    CARVEDILOL (COREG) 25 MG TABLET    TAKE 1 TABLET BY MOUTH TWICE DAILY WITH MEAL    CETIRIZINE (ZYRTEC) 10 MG TABLET    Take 1 tablet (10 mg total) by mouth once daily.    CHLORTHALIDONE (HYGROTEN) 25 MG TAB    Take 1 tablet (25 mg total) by mouth once daily.    EZETIMIBE (ZETIA) 10 MG TABLET    Take 1 tablet (10 mg total) by mouth once daily.    FLUTICASONE PROPIONATE (FLONASE) 50 MCG/ACTUATION NASAL SPRAY    2 sprays (100 mcg total) by Each Nostril route once daily.    FLUTICASONE PROPIONATE (FLONASE) 50 MCG/ACTUATION NASAL SPRAY    2 sprays (100 mcg total) by Each Nostril route once daily.    FLUTICASONE PROPIONATE (FLONASE) 50 MCG/ACTUATION NASAL SPRAY    1 spray (50 mcg total) by Each Nostril route once daily.    KETOCONAZOLE (NIZORAL) 2 % CREAM    Apply topically 2 (two) times daily. Prn facial rash or dryness    LOSARTAN (COZAAR) 100 MG TABLET    Take 1 tablet (100 mg total) by mouth once daily.    METFORMIN (GLUCOPHAGE-XR) 500 MG ER 24HR TABLET    Take 2 tablets (1,000 mg total) by mouth daily with dinner or evening meal.    NITROGLYCERIN (NITROSTAT) 0.4 MG SL TABLET    Place 1 tablet (0.4 mg total) under the tongue every 5 (five) minutes as needed for Chest pain.    OMEPRAZOLE  (PRILOSEC) 20 MG CAPSULE    Take 1 capsule (20 mg total) by mouth once daily. for 14 days    POLYETHYLENE GLYCOL (GOLYTELY) 236-22.74-6.74 -5.86 GRAM SUSPENSION    Take 4,000 mLs (4 L total) by mouth once. for 1 dose    POTASSIUM CHLORIDE (K-TAB) 20 MEQ    Take 1 tablet (20 mEq total) by mouth once daily.    TRIAMCINOLONE ACETONIDE TOP           Allergies   Review of patient's allergies indicates:   Allergen Reactions    Crab Rash    Shrimp Rash         Review of Systems (Pertinent positives)  Review of Systems   Constitutional:  Negative for activity change and unexpected weight change.   HENT:  Negative for hearing loss, rhinorrhea and trouble swallowing.    Eyes:  Negative for discharge and visual disturbance.   Respiratory:  Negative for chest tightness and wheezing.    Cardiovascular:  Negative for chest pain and palpitations.   Gastrointestinal:  Negative for blood in stool, constipation, diarrhea and vomiting.   Endocrine: Positive for polyuria. Negative for polydipsia.   Genitourinary:  Positive for flank pain (prior R flank). Negative for difficulty urinating, dysuria, hematuria and menstrual problem.   Musculoskeletal:  Negative for arthralgias and joint swelling.   Neurological:  Negative for weakness and headaches.   Psychiatric/Behavioral:  Negative for confusion and dysphoric mood.      There were no vitals taken for this visit.    Physical Exam  Vitals reviewed.   Constitutional:       General: She is not in acute distress.     Appearance: Normal appearance. She is well-developed.   HENT:      Head: Normocephalic and atraumatic.   Eyes:      Conjunctiva/sclera: Conjunctivae normal.   Pulmonary:      Effort: Pulmonary effort is normal.   Neurological:      Mental Status: She is alert and oriented to person, place, and time.   Psychiatric:         Mood and Affect: Mood normal.         Behavior: Behavior normal.         Assessment/Plan:  Lizte Palencia is a 52 y.o. female who presents today for  :        ICD-10-CM ICD-9-CM    1. Coronary artery disease involving native coronary artery of native heart without angina pectoris  I25.10 414.01 Hemoglobin A1C      Comprehensive Metabolic Panel      Lipid Panel      2. Class 2 severe obesity due to excess calories with serious comorbidity and body mass index (BMI) of 35.0 to 35.9 in adult  E66.01 278.01 semaglutide, weight loss, (WEGOVY) 0.25 mg/0.5 mL PnIj    Z68.35 V85.35 semaglutide, weight loss, (WEGOVY) 0.5 mg/0.5 mL PnIj      3. Prediabetes  R73.03 790.29 Hemoglobin A1C      Comprehensive Metabolic Panel      Lipid Panel      4. Right flank pain  R10.9 789.09            RIGHT FLANK PAIN: Obtain Coloscopy as scheduled, will be more mindful of posture/ core activation as she is at a desk lots and prone to postural imbalance.     Coronary artery disease status post NSTEMI, stent placement with underlying obesity, hypertension, hyperlipidemia, prediabetes: previously put on about 10 lbs with increase in A1c to 6.3. A1C IMPROVED TO 6.1--> 5.9.  WITH STARTING METFORMIN. Better tolerating 500 XR daily (down from 1000) but needs to take it more consistently. Would like to try weekly GLP-1 instead, and Wegovy sent to try if it's affordable. Repeat labs in 3 month prior to follow up OV w/ me. Following with Cardiology Dr Cox-- has normal stress test w/ in the year (2/11/22) and no recent NTG use. LDL still greater than 70 and will discuss at upcoming OV.     Continue high dose STATIN with Lipitor 80 + Zetia 10 to achieve LDL goal < 70, ASA 81 mg daily for secondary prevention.   BP controlled on Cozaar 100/ Amlodipine 10./ COREG 25 BID, CHLORTHALIDONE 25 (+ K= supplement)      There are no Patient Instructions on file for this visit.      Follow up in about 3 months (around 4/17/2023) for to follow up on lab results, return as needed for new concerns.

## 2023-01-17 NOTE — TELEPHONE ENCOUNTER
Spoke with patient about arrival time @ 1230.   Covid test = vacc    Prep instructions reviewed: the day before the procedure, follow a clear liquid diet all day, then start the first 1/2 of prep at 5pm and take 2nd 1/2 of prep @ 0730.  Pt must be completely NPO when prep completed @ 0930.  Golytelt rx sent to pharmacy.           Medications: Do not take Insulin or oral diabetic medications the day of the procedure.  Take as prescribed: heart, seizure and blood pressure medication in the morning with a sip of water (less than an ounce).  Take any breathing medications and bring inhalers to hospital with you Leave all valuables and jewelry at home.     Wear comfortable clothes to procedure to change into hospital gown You cannot drive for 24 hours after your procedure because you will receive sedation for your procedure to make you comfortable.  A ride must be provided at discharge.

## 2023-01-19 ENCOUNTER — HOSPITAL ENCOUNTER (OUTPATIENT)
Facility: HOSPITAL | Age: 53
Discharge: HOME | End: 2023-01-19
Attending: INTERNAL MEDICINE | Admitting: INTERNAL MEDICINE
Payer: OTHER GOVERNMENT

## 2023-01-19 ENCOUNTER — ANESTHESIA EVENT (OUTPATIENT)
Dept: ENDOSCOPY | Facility: HOSPITAL | Age: 53
End: 2023-01-19
Payer: OTHER GOVERNMENT

## 2023-01-19 ENCOUNTER — ANESTHESIA (OUTPATIENT)
Dept: ENDOSCOPY | Facility: HOSPITAL | Age: 53
End: 2023-01-19
Payer: OTHER GOVERNMENT

## 2023-01-19 VITALS
HEART RATE: 74 BPM | HEIGHT: 70 IN | DIASTOLIC BLOOD PRESSURE: 66 MMHG | WEIGHT: 230 LBS | OXYGEN SATURATION: 100 % | TEMPERATURE: 98 F | RESPIRATION RATE: 7 BRPM | SYSTOLIC BLOOD PRESSURE: 143 MMHG | BODY MASS INDEX: 32.93 KG/M2

## 2023-01-19 DIAGNOSIS — Z12.11 COLON CANCER SCREENING: Primary | ICD-10-CM

## 2023-01-19 PROCEDURE — 25000003 PHARM REV CODE 250: Performed by: INTERNAL MEDICINE

## 2023-01-19 PROCEDURE — 00812 ANES LWR INTST SCR COLSC: CPT | Performed by: INTERNAL MEDICINE

## 2023-01-19 PROCEDURE — 37000008 HC ANESTHESIA 1ST 15 MINUTES: Performed by: INTERNAL MEDICINE

## 2023-01-19 PROCEDURE — 37000009 HC ANESTHESIA EA ADD 15 MINS: Performed by: INTERNAL MEDICINE

## 2023-01-19 PROCEDURE — 25000003 PHARM REV CODE 250: Performed by: NURSE ANESTHETIST, CERTIFIED REGISTERED

## 2023-01-19 PROCEDURE — G0121 COLON CA SCRN NOT HI RSK IND: HCPCS | Performed by: INTERNAL MEDICINE

## 2023-01-19 PROCEDURE — D9220A PRA ANESTHESIA: ICD-10-PCS | Mod: ANES,,, | Performed by: STUDENT IN AN ORGANIZED HEALTH CARE EDUCATION/TRAINING PROGRAM

## 2023-01-19 PROCEDURE — 25000003 PHARM REV CODE 250: Performed by: STUDENT IN AN ORGANIZED HEALTH CARE EDUCATION/TRAINING PROGRAM

## 2023-01-19 PROCEDURE — G0121 COLON CA SCRN NOT HI RSK IND: HCPCS | Mod: ,,, | Performed by: INTERNAL MEDICINE

## 2023-01-19 PROCEDURE — G0121 COLON CA SCRN NOT HI RSK IND: ICD-10-PCS | Mod: ,,, | Performed by: INTERNAL MEDICINE

## 2023-01-19 PROCEDURE — 63600175 PHARM REV CODE 636 W HCPCS: Performed by: STUDENT IN AN ORGANIZED HEALTH CARE EDUCATION/TRAINING PROGRAM

## 2023-01-19 PROCEDURE — D9220A PRA ANESTHESIA: Mod: ANES,,, | Performed by: STUDENT IN AN ORGANIZED HEALTH CARE EDUCATION/TRAINING PROGRAM

## 2023-01-19 PROCEDURE — D9220A PRA ANESTHESIA: ICD-10-PCS | Mod: CRNA,,, | Performed by: NURSE ANESTHETIST, CERTIFIED REGISTERED

## 2023-01-19 PROCEDURE — D9220A PRA ANESTHESIA: Mod: CRNA,,, | Performed by: NURSE ANESTHETIST, CERTIFIED REGISTERED

## 2023-01-19 RX ORDER — PROPOFOL 10 MG/ML
INJECTION, EMULSION INTRAVENOUS
Status: DISCONTINUED | OUTPATIENT
Start: 2023-01-19 | End: 2023-01-19

## 2023-01-19 RX ORDER — PROPOFOL 10 MG/ML
INJECTION, EMULSION INTRAVENOUS CONTINUOUS PRN
Status: DISCONTINUED | OUTPATIENT
Start: 2023-01-19 | End: 2023-01-19

## 2023-01-19 RX ORDER — LIDOCAINE HCL/PF 100 MG/5ML
SYRINGE (ML) INTRAVENOUS
Status: DISCONTINUED | OUTPATIENT
Start: 2023-01-19 | End: 2023-01-19

## 2023-01-19 RX ORDER — ONDANSETRON 2 MG/ML
4 INJECTION INTRAMUSCULAR; INTRAVENOUS DAILY PRN
Status: CANCELLED | OUTPATIENT
Start: 2023-01-19

## 2023-01-19 RX ORDER — FENTANYL CITRATE 50 UG/ML
25 INJECTION, SOLUTION INTRAMUSCULAR; INTRAVENOUS EVERY 5 MIN PRN
Status: CANCELLED | OUTPATIENT
Start: 2023-01-19

## 2023-01-19 RX ORDER — SODIUM CHLORIDE 9 MG/ML
INJECTION, SOLUTION INTRAVENOUS CONTINUOUS
Status: DISCONTINUED | OUTPATIENT
Start: 2023-01-19 | End: 2023-01-19 | Stop reason: HOSPADM

## 2023-01-19 RX ADMIN — SODIUM CHLORIDE: 0.9 INJECTION, SOLUTION INTRAVENOUS at 01:01

## 2023-01-19 RX ADMIN — LIDOCAINE HYDROCHLORIDE 100 MG: 20 INJECTION, SOLUTION INTRAVENOUS at 02:01

## 2023-01-19 RX ADMIN — PROPOFOL 80 MG: 10 INJECTION, EMULSION INTRAVENOUS at 02:01

## 2023-01-19 RX ADMIN — SODIUM CHLORIDE: 0.9 INJECTION, SOLUTION INTRAVENOUS at 02:01

## 2023-01-19 RX ADMIN — PROPOFOL 150 MCG/KG/MIN: 10 INJECTION, EMULSION INTRAVENOUS at 02:01

## 2023-01-19 NOTE — [employer] PATIENT INSTRUCTIONS
Discharge Summary/Instructions after an Endoscopic Procedure  Patient Name: Lizet Christianson  Patient MRN: [id number]  Patient YOB: 1970 Thursday, January 19, 2023  Trung Strickland MD  Dear patient,  As a result of recent federal legislation (The Federal Cures Act), you may   receive lab or pathology results from your procedure in your [facility]   account before your physician is able to contact you. Your physician or   their representative will relay the results to you with their   recommendations at their soonest availability.  Thank you,  Your health is very important to us during the Covid Crisis. Following your   procedure today, you will receive a daily text for 2 weeks asking about   signs or symptoms of Covid 19.  Please respond to this text when you   receive it so we can follow up and keep you as safe as possible.   RESTRICTIONS:  During your procedure today, you received medications for sedation.  These   medications may affect your judgment, balance and coordination.  Therefore,   for 24 hours, you have the following restrictions:   - DO NOT drive a car, operate machinery, make legal/financial decisions,   sign important papers or drink alcohol.    ACTIVITY:  Today: no heavy lifting, straining or running due to procedural   sedation/anesthesia.  The following day: return to full activity including work.  DIET:  Eat and drink normally unless instructed otherwise.     TREATMENT FOR COMMON SIDE EFFECTS:  - Mild abdominal pain, nausea, belching, bloating or excessive gas:  rest,   eat lightly and use a heating pad.  - Sore Throat: treat with throat lozenges and/or gargle with warm salt   water.  - Because air was used during the procedure, expelling large amounts of air   from your rectum or belching is normal.  - If a bowel prep was taken, you may not have a bowel movement for 1-3 days.    This is normal.  SYMPTOMS TO WATCH FOR AND REPORT TO YOUR PHYSICIAN:  1. Abdominal pain or  bloating, other than gas cramps.  2. Chest pain.  3. Back pain.  4. Signs of infection such as: chills or fever occurring within 24 hours   after the procedure.  5. Rectal bleeding, which would show as bright red, maroon, or black stools.   (A tablespoon of blood from the rectum is not serious, especially if   hemorrhoids are present.)  6. Vomiting.  7. Weakness or dizziness.  GO DIRECTLY TO THE NEAREST EMERGENCY ROOM IF YOU HAVE ANY OF THE FOLLOWING:      Difficulty breathing              Chills and/or fever over 101 F   Persistent vomiting and/or vomiting blood   Severe abdominal pain   Severe chest pain   Black, tarry stools   Bleeding- more than one tablespoon   Any other symptom or condition that you feel may need urgent attention  Your doctor recommends these additional instructions:  If any biopsies were taken, your doctors clinic will contact you in 1 to 2   weeks with any results.  - Discharge patient to home.   - Resume previous diet.   - Continue present medications.   - Repeat colonoscopy in 10 years for screening purposes.   - Patient has a contact number available for emergencies.  The signs and   symptoms of potential delayed complications were discussed with the   patient.  Return to normal activities tomorrow.  Written discharge   instructions were provided to the patient.  For questions, problems or results please call your physician - Trung Strickland MD.  EMERGENCY PHONE NUMBER: [phone],  LAB RESULTS: [phone]  IF A COMPLICATION OR EMERGENCY SITUATION ARISES AND YOU ARE UNABLE TO REACH   YOUR PHYSICIAN - GO DIRECTLY TO THE EMERGENCY ROOM.  Trung Strickland MD  1/19/2023 3:20:47 PM  This report has been verified and signed electronically.  Dear patient,  As a result of recent federal legislation (The Federal Cures Act), you may   receive lab or pathology results from your procedure in your [facility]   account before your physician is able to contact you. Your  physician or   their representative will relay the results to you with their   recommendations at their soonest availability.  Thank you,  [employer]

## 2023-01-19 NOTE — ANESTHESIA PREPROCEDURE EVALUATION
[facility]  Anesthesia Pre-Operative Evaluation         Patient Name: Lizet Palencia  YOB: 1970  MRN: [id number]    SUBJECTIVE:     01/19/2023    Procedure(s) (LRB):  COLONOSCOPY (N/A)    Lizet Palencia is a 52 y.o. female here for Procedure(s) (LRB):  COLONOSCOPY (N/A)        Patient Active Problem List   Diagnosis    Uterine fibroid    Accelerated hypertension    Obesity due to excess calories with serious comorbidity    Allergic rhinitis    Status post coronary artery stent placement    Plantar fasciitis    Hepatitis C antibody test positive    Diuretic-induced hypokalemia    History of non-ST elevation myocardial infarction (NSTEMI)    Coronary artery disease involving native coronary artery of native heart without angina pectoris    Long-term use of aspirin therapy    Prediabetes    Cubital tunnel syndrome on left    Essential hypertension    Vitamin D deficiency    Ulnar nerve palsy of right upper extremity    Acute left-sided low back pain with left-sided sciatica    Lateral pain of hip    Decreased strength of lower extremity       Review of patient's allergies indicates:   Allergen Reactions    Crab Rash    Shrimp Rash       No current facility-administered medications on file prior to encounter.     Current Outpatient Medications on File Prior to Encounter   Medication Sig Dispense Refill    amLODIPine (NORVASC) 10 MG tablet Take 1 tablet (10 mg total) by mouth once daily. 90 tablet 4    aspirin 81 MG Chew Take 81 mg by mouth once daily.      atorvastatin (LIPITOR) 80 MG tablet Take 1 tablet (80 mg total) by mouth once daily. 90 tablet 4    carvediloL (COREG) 25 MG tablet TAKE 1 TABLET BY MOUTH TWICE DAILY WITH MEAL 180 tablet 4    cetirizine (ZYRTEC) 10 MG tablet Take 1 tablet (10 mg total) by mouth once daily. 30 tablet 2    chlorthalidone  (HYGROTEN) 25 MG Tab Take 1 tablet (25 mg total) by mouth once daily. 90 tablet 4    ezetimibe (ZETIA) 10 mg tablet Take 1 tablet (10 mg total) by mouth once daily. 90 tablet 3    fluticasone propionate (FLONASE) 50 mcg/actuation nasal spray 2 sprays (100 mcg total) by Each Nostril route once daily. 18 mL 5    fluticasone propionate (FLONASE) 50 mcg/actuation nasal spray 2 sprays (100 mcg total) by Each Nostril route once daily. 15.8 mL 0    fluticasone propionate (FLONASE) 50 mcg/actuation nasal spray 1 spray (50 mcg total) by Each Nostril route once daily. 15.8 mL 0    losartan (COZAAR) 100 MG tablet Take 1 tablet (100 mg total) by mouth once daily. 90 tablet 4    metFORMIN (GLUCOPHAGE-XR) 500 MG ER 24hr tablet Take 2 tablets (1,000 mg total) by mouth daily with dinner or evening meal. 180 tablet 4    omeprazole (PRILOSEC) 20 MG capsule Take 1 capsule (20 mg total) by mouth once daily. for 14 days 14 capsule 0    potassium chloride (K-TAB) 20 mEq Take 1 tablet (20 mEq total) by mouth once daily. 90 tablet 4    TRIAMCINOLONE ACETONIDE TOP          Past Surgical History:   Procedure Laterality Date     SECTION, CLASSIC      x3    CORONARY STENT PLACEMENT      D&C Hysteroscopy      right arm surgery  2009    TUBAL LIGATION         Social History     Socioeconomic History    Marital status:    Tobacco Use    Smoking status: Never    Smokeless tobacco: Never   Substance and Sexual Activity    Alcohol use: Yes     Comment: social    Drug use: No    Sexual activity: Yes     Partners: Male     Birth control/protection: Surgical         OBJECTIVE:     Vital Signs Range (Last 24H):       Significant Labs:  Lab Results   Component Value Date    WBC 4.05 2022    HGB 13.2 2022    HCT 40.4 2022     2022    CHOL 144 12/15/2022    TRIG 37 12/15/2022    HDL 46 12/15/2022    ALT 11 12/15/2022    AST 14 12/15/2022     12/15/2022    K 3.7 12/15/2022      12/15/2022    CREATININE 0.7 12/15/2022    BUN 16 12/15/2022    CO2 29 12/15/2022    TSH 0.809 05/09/2022    INR 1.0 06/29/2016    HGBA1C 5.9 (H) 12/15/2022       Diagnostic Studies:    EKG:   Results for orders placed or performed in visit on 12/15/22   EKG 12-lead    Collection Time: 12/15/22  4:52 PM    Narrative    Test Reason :     Vent. Rate : 067 BPM     Atrial Rate : 067 BPM     P-R Int : 160 ms          QRS Dur : 084 ms      QT Int : 418 ms       P-R-T Axes : 057 028 -83 degrees     QTc Int : 441 ms    Normal sinus rhythm  ST and T wave abnormality, consider inferolateral ischemia  ST and T wave abnormality, consider anterolateral ischemia  Abnormal ECG  When compared with ECG of 26-JAN-2022 18:57,  Minimal criteria for Anterior infarct are no longer Present  Nonspecific T wave abnormality has replaced inverted T waves in Inferior  leads  T wave inversion more evident in Lateral leads  Confirmed by Linnette Alvarez MD ([id number]) on 12/27/2022 3:23:01 PM    Referred By:  mari           Confirmed By:Linnette Alvarez MD           Pre-op Assessment    I have reviewed the Patient Summary Reports.     I have reviewed the Nursing Notes. I have reviewed the NPO Status.   I have reviewed the Medications.     Review of Systems  Anesthesia Hx:  No problems with previous Anesthesia  History of prior surgery of interest to airway management or planning:  Denies Personal Hx of Anesthesia complications.   Social:  Non-Smoker, Social Alcohol Use    Hematology/Oncology:  Hematology Normal   Oncology Normal     EENT/Dental:EENT/Dental Normal   Cardiovascular:   Exercise tolerance: good Hypertension Past MI CAD  CABG/stent  CAD s/p PCI LCX to NSTEMI in 2015 with 3.5 x 15 resolute ALDA, LHC in 2016 showed patent stent   Pulmonary:  Pulmonary Normal  Denies COPD.  Denies Asthma.  Denies Sleep Apnea.    Renal/:  Renal/ Normal  Denies Chronic Renal Disease.     Hepatic/GI:  Hepatic/GI Normal  Denies GERD.    Neurological:    Neuromuscular Disease,    Endocrine:   Diabetes A1c = 5.9   BMI 33.   Obesity / BMI > 30      Physical Exam  General: Well nourished, Cooperative, Alert and Oriented    Airway:  Mallampati: II   Mouth Opening: Normal  TM Distance: Normal  Tongue: Normal  Neck ROM: Normal ROM    Chest/Lungs:  Clear to auscultation, Normal Respiratory Rate    Heart:  Rate: Normal  Rhythm: Regular Rhythm        Anesthesia Plan  Type of Anesthesia, risks & benefits discussed:    Anesthesia Type: Gen Natural Airway  Intra-op Monitoring Plan: Standard ASA Monitors  Post Op Pain Control Plan: multimodal analgesia  Induction:  IV  Informed Consent: Informed consent signed with the Patient and all parties understand the risks and agree with anesthesia plan.  All questions answered.   ASA Score: 2  Day of Surgery Review of History & Physical: H&P Update referred to the surgeon/provider.  Anesthesia Plan Notes:   Pt with glucose 59 in pre-op.  Pt denies dizziness, light-headedness, or palpitations.  Will plan to monitor pt in recovery and have her drink some juice prior to discharge.  Does not measure glucose at home.     Ready For Surgery From Anesthesia Perspective.     .

## 2023-01-19 NOTE — TRANSFER OF CARE
"Anesthesia Transfer of Care Note    Patient: Lizet Palencia    Procedure(s) Performed: Procedure(s) (LRB):  COLONOSCOPY (N/A)    Patient location: GI    Anesthesia Type: general    Transport from OR: Transported from OR on room air with adequate spontaneous ventilation    Post pain: adequate analgesia    Post assessment: no apparent anesthetic complications    Post vital signs: stable    Level of consciousness: responds to stimulation    Nausea/Vomiting: no nausea/vomiting    Complications: none    Transfer of care protocol was followed      Last vitals:   Visit Vitals  BP (!) 140/78 (Patient Position: Lying)   Pulse 88   Temp 36.7 °C (98 °F) (Temporal)   Resp 18   Ht 5' 10" (1.778 m)   Wt 104.3 kg (230 lb)   SpO2 100%   Breastfeeding No   BMI 33.00 kg/m²     "

## 2023-01-19 NOTE — ANESTHESIA POSTPROCEDURE EVALUATION
Anesthesia Post Evaluation    Patient: Lizet Palencia    Procedure(s) Performed: Procedure(s) (LRB):  COLONOSCOPY (N/A)    Final Anesthesia Type: general      Patient location during evaluation: GI PACU  Patient participation: Yes- Able to Participate  Level of consciousness: awake and alert  Post-procedure vital signs: reviewed and stable  Pain management: adequate  Airway patency: patent    PONV status at discharge: No PONV  Anesthetic complications: no      Cardiovascular status: hemodynamically stable, stable and blood pressure returned to baseline  Respiratory status: unassisted, spontaneous ventilation and room air  Hydration status: euvolemic  Follow-up not needed.          Vitals Value Taken Time   /78 01/19/23 1522   Temp 36.7 °C (98 °F) 01/19/23 1522   Pulse 88 01/19/23 1522   Resp 18 01/19/23 1522   SpO2 100 % 01/19/23 1522         No case tracking events are documented in the log.      Pain/Oliverio Score: Oliverio Score: 9 (1/19/2023  3:23 PM)

## 2023-01-20 ENCOUNTER — PATIENT MESSAGE (OUTPATIENT)
Dept: FAMILY MEDICINE | Facility: CLINIC | Age: 53
End: 2023-01-20
Payer: OTHER GOVERNMENT

## 2023-01-20 LAB — POCT GLUCOSE: 59 MG/DL (ref 70–110)

## 2023-01-24 NOTE — H&P
Short Stay Endoscopy History and Physical    PCP - Marta Louis MD    Procedure - Colonoscopy  ASA - II  Mallampati - per anesthesia  History of Anesthesia problems - no  Family history Anesthesia problems - no     HPI:  This is a 52 y.o. female here for evaluation of : Colon cancer screening    Family history of colon cancer - no  History of polyps - na  Change in bowel habits - no  Rectal bleeding - no      ROS:  Constitutional: No fevers, chills, No weight loss  ENT: No allergies  CV: No chest pain  Pulm: No shortness of breath  GI: see HPI  Derm: No rash    Medical History:  has a past medical history of Accelerated hypertension (2015), Allergic rhinitis (1/3/2015), Coronary artery disease (2016), Coronary artery disease involving native coronary artery of native heart without angina pectoris (2016), Hepatitis C antibody test positive (2016), History of non-ST elevation myocardial infarction (NSTEMI) (2016), Menorrhagia, Obesity (BMI 30-39.9) (2015), Prediabetes (3/14/2018), and Status post coronary artery stent placement (2015).    Surgical History:  has a past surgical history that includes  section, classic; right arm surgery (); D&C Hysteroscopy; Coronary stent placement; Tubal ligation; and Colonoscopy (N/A, 2023).    Family History: family history includes Diabetes in her maternal aunt; Hypertension in her maternal aunt and mother.. Otherwise no colon cancer, inflammatory bowel disease, or GI malignancies.    Social History:  reports that she has never smoked. She has never used smokeless tobacco. She reports current alcohol use of about 2.0 standard drinks per week. She reports that she does not use drugs.    Review of patient's allergies indicates:   Allergen Reactions    Crab Rash    Shrimp Rash       Medications:   No medications prior to admission.         Objective Findings:    Vital Signs: see nursing notes  Physical Exam:  General Appearance: Well  appearing in no acute distress  Eyes:    No scleral icterus  ENT: Neck supple  Lungs: CTA anteriorly  Heart:  S1, S2 normal, no murmurs heard  Abdomen: Soft, non tender, non distended with positive bowel sounds. No hepatosplenomegaly, ascites, or mass  Extremities: no edema  Skin: No rash      Labs:  Lab Results   Component Value Date    WBC 4.05 05/09/2022    HGB 13.2 05/09/2022    HCT 40.4 05/09/2022     05/09/2022    CHOL 144 12/15/2022    TRIG 37 12/15/2022    HDL 46 12/15/2022    ALT 11 12/15/2022    AST 14 12/15/2022     12/15/2022    K 3.7 12/15/2022     12/15/2022    CREATININE 0.7 12/15/2022    BUN 16 12/15/2022    CO2 29 12/15/2022    TSH 0.809 05/09/2022    INR 1.0 06/29/2016    HGBA1C 5.9 (H) 12/15/2022       I have explained the risks and benefits of endoscopy procedures to the patient including but not limited to bleeding, perforation, infection, and death.    Trung Strickland MD

## 2023-01-26 ENCOUNTER — OFFICE VISIT (OUTPATIENT)
Dept: CARDIOLOGY | Facility: CLINIC | Age: 53
End: 2023-01-26
Payer: OTHER GOVERNMENT

## 2023-01-26 VITALS
SYSTOLIC BLOOD PRESSURE: 117 MMHG | WEIGHT: 224.88 LBS | DIASTOLIC BLOOD PRESSURE: 87 MMHG | OXYGEN SATURATION: 99 % | BODY MASS INDEX: 32.19 KG/M2 | HEART RATE: 74 BPM | HEIGHT: 70 IN

## 2023-01-26 DIAGNOSIS — I25.2 HISTORY OF NON-ST ELEVATION MYOCARDIAL INFARCTION (NSTEMI): ICD-10-CM

## 2023-01-26 DIAGNOSIS — Z95.5 STATUS POST CORONARY ARTERY STENT PLACEMENT: Primary | ICD-10-CM

## 2023-01-26 DIAGNOSIS — E66.01 CLASS 2 SEVERE OBESITY DUE TO EXCESS CALORIES WITH SERIOUS COMORBIDITY AND BODY MASS INDEX (BMI) OF 35.0 TO 35.9 IN ADULT: ICD-10-CM

## 2023-01-26 DIAGNOSIS — I25.10 CORONARY ARTERY DISEASE INVOLVING NATIVE CORONARY ARTERY OF NATIVE HEART WITHOUT ANGINA PECTORIS: ICD-10-CM

## 2023-01-26 DIAGNOSIS — I10 ESSENTIAL HYPERTENSION: ICD-10-CM

## 2023-01-26 PROCEDURE — 99999 PR PBB SHADOW E&M-EST. PATIENT-LVL IV: ICD-10-PCS | Mod: PBBFAC,,, | Performed by: INTERNAL MEDICINE

## 2023-01-26 PROCEDURE — 99214 PR OFFICE/OUTPT VISIT, EST, LEVL IV, 30-39 MIN: ICD-10-PCS | Mod: S$PBB,,, | Performed by: INTERNAL MEDICINE

## 2023-01-26 PROCEDURE — 99214 OFFICE O/P EST MOD 30 MIN: CPT | Mod: S$PBB,,, | Performed by: INTERNAL MEDICINE

## 2023-01-26 PROCEDURE — 99999 PR PBB SHADOW E&M-EST. PATIENT-LVL IV: CPT | Mod: PBBFAC,,, | Performed by: INTERNAL MEDICINE

## 2023-01-26 PROCEDURE — 99214 OFFICE O/P EST MOD 30 MIN: CPT | Mod: PBBFAC,PO | Performed by: INTERNAL MEDICINE

## 2023-01-26 RX ORDER — BENZONATATE 100 MG/1
200 CAPSULE ORAL EVERY 8 HOURS PRN
COMMUNITY
Start: 2023-01-16

## 2023-01-26 RX ORDER — CLINDAMYCIN PHOSPHATE 10 UG/ML
LOTION TOPICAL
COMMUNITY
Start: 2022-10-06

## 2023-01-26 NOTE — PROGRESS NOTES
Subjective:   @Patient ID:  Lizet Palencia is a 52 y.o. female who presents for follow-up of CAD      HPI:   Patient is here for follow up  She is doing well today.  No recurrent chest pains.  She did not have to take any nitroglycerin.  Blood pressure is well controlled.  She does have some vague right upper quadrant pain completely different than her prior cardiac chest pain      She is compliant with her medication    Stress MPI earlier this year was negative for ischemia        Prior cardiovascular  Hx  --------------------------------  Stress MPI 2/11/2022  1. Scintigraphically negative for ischemia or infarct.  2. The global left ventricular systolic function is normal with an LV ejection fraction of greater than 75 % and no evidence of LV dilatation. Wall motion is normal.    CAD s/p PCI LCX to NSTEMI in 2015 with 3.5 x 15 resolute ALDA, LHC in 2016 showed patent stent    EKG December 15, 2022 sinus rhythm diffuse nonspecific T-wave abnormalities no acute changes as compared to prior.  - ECHO 1/2015 EF 60-65%    Patient Active Problem List    Diagnosis Date Noted    Lateral pain of hip 01/25/2022    Decreased strength of lower extremity 01/25/2022    Acute left-sided low back pain with left-sided sciatica 01/24/2022    Ulnar nerve palsy of right upper extremity 09/22/2020    Vitamin D deficiency 03/02/2020    Essential hypertension 10/16/2019    Cubital tunnel syndrome on left 01/14/2019    Prediabetes 03/14/2018    Long-term use of aspirin therapy 10/25/2017    Diuretic-induced hypokalemia 07/02/2016    History of non-ST elevation myocardial infarction (NSTEMI) 07/02/2016    Coronary artery disease involving native coronary artery of native heart without angina pectoris 07/02/2016    Hepatitis C antibody test positive 05/04/2016     Negative HCV RNA 05/2016, will check again in 3 months but no detectable HCV virus  HCV AB will likely remain positive for life  HCV RNA again undetectable 10/2/17       Plantar fasciitis 10/22/2015     stretching exercises demonstrated, recommended foot wear fitting-- good arch support      Status post coronary artery stent placement 01/19/2015     LCx stent [id number]/34569      Allergic rhinitis 01/03/2015    Obesity due to excess calories with serious comorbidity 01/02/2015    Accelerated hypertension 01/01/2015    Uterine fibroid 09/09/2013                    LAST HbA1c  Lab Results   Component Value Date    HGBA1C 5.9 (H) 12/15/2022       Lipid panel  Lab Results   Component Value Date    CHOL 144 12/15/2022    CHOL 123 05/09/2022    CHOL 129 09/10/2021     Lab Results   Component Value Date    HDL 46 12/15/2022    HDL 41 05/09/2022    HDL 43 09/10/2021     Lab Results   Component Value Date    LDLCALC 90.6 12/15/2022    LDLCALC 74.0 05/09/2022    LDLCALC 73.6 09/10/2021     Lab Results   Component Value Date    TRIG 37 12/15/2022    TRIG 40 05/09/2022    TRIG 62 09/10/2021     Lab Results   Component Value Date    CHOLHDL 31.9 12/15/2022    CHOLHDL 33.3 05/09/2022    CHOLHDL 33.3 09/10/2021            Review of Systems   Constitutional: Negative for chills and fever.   HENT:  Negative for hearing loss and nosebleeds.    Eyes:  Negative for blurred vision.   Cardiovascular:  Negative for chest pain and palpitations.   Respiratory:  Negative for cough, hemoptysis and shortness of breath.    Hematologic/Lymphatic: Negative for bleeding problem.   Skin:  Negative for itching.   Musculoskeletal:  Negative for falls.   Gastrointestinal:  Negative for abdominal pain and hematochezia.   Genitourinary:  Negative for hematuria.   Neurological:  Negative for dizziness and loss of balance.   Psychiatric/Behavioral:  Negative for altered mental status and depression.      Objective:   Physical Exam  Constitutional:       Appearance: She is well-developed.   HENT:      Head: Normocephalic and atraumatic.   Eyes:      Conjunctiva/sclera: Conjunctivae normal.   Neck:      Vascular: No carotid bruit.    Cardiovascular:      Rate and Rhythm: Normal rate and regular rhythm.      Heart sounds: Normal heart sounds. No murmur heard.    No friction rub. No gallop.   Pulmonary:      Effort: Pulmonary effort is normal. No respiratory distress.      Breath sounds: Normal breath sounds. No stridor. No wheezing.   Musculoskeletal:      Cervical back: Neck supple.   Skin:     General: Skin is warm and dry.   Neurological:      Mental Status: She is alert and oriented to person, place, and time.   Psychiatric:         Behavior: Behavior normal.       Assessment:     1. Status post coronary artery stent placement    2. History of non-ST elevation myocardial infarction (NSTEMI)    3. Coronary artery disease involving native coronary artery of native heart without angina pectoris    4. Essential hypertension    5. Class 2 severe obesity due to excess calories with serious comorbidity and body mass index (BMI) of 35.0 to 35.9 in adult          Plan:   - Continue OMT with ASA and Statin  - stable coronary artery disease  -  stress MPI this year with no ischemia.  -  nitroglycerin p.r.n..    - Target LDL < 70 ,  Continue Zetial and Lipitor 80 mg qhs We.  We discussed PCSK 9 inhibitors. At this time would stick with current tx and lifestyle changes.     - WT loss and life style modification discussed with the patient.        I spent 5-10 minutes asking, assessing, assisting, arranging and advising heart healthy diet improvements. This included low-salt meals, portion control and health food alternatives. I also encourage 30 minutes of moderate exercise 3-4x a week.       EKG reviewed no acute ST changes.    Continue with current medical plan and lifestyle changes.  Return sooner for concerns or questions. If symptoms persist go to the ED  I have reviewed all pertinent data including patient's medical history in detail and updated the computerized patient record.     No orders of the defined types were placed in this  encounter.      Follow up as scheduled.     She expressed verbal understanding and agreed with the plan    Patient's Medications   New Prescriptions    No medications on file   Previous Medications    AMLODIPINE (NORVASC) 10 MG TABLET    Take 1 tablet (10 mg total) by mouth once daily.    ASPIRIN 81 MG CHEW    Take 81 mg by mouth once daily.    ATORVASTATIN (LIPITOR) 80 MG TABLET    Take 1 tablet (80 mg total) by mouth once daily.    BENZONATATE (TESSALON) 100 MG CAPSULE    Take 200 mg by mouth every 8 (eight) hours as needed.    CARVEDILOL (COREG) 25 MG TABLET    TAKE 1 TABLET BY MOUTH TWICE DAILY WITH MEAL    CETIRIZINE (ZYRTEC) 10 MG TABLET    Take 1 tablet (10 mg total) by mouth once daily.    CHLORTHALIDONE (HYGROTEN) 25 MG TAB    Take 1 tablet (25 mg total) by mouth once daily.    CLINDAMYCIN (CLEOCIN T) 1 % LOTION        EZETIMIBE (ZETIA) 10 MG TABLET    Take 1 tablet (10 mg total) by mouth once daily.    FLUTICASONE PROPIONATE (FLONASE) 50 MCG/ACTUATION NASAL SPRAY    2 sprays (100 mcg total) by Each Nostril route once daily.    FLUTICASONE PROPIONATE (FLONASE) 50 MCG/ACTUATION NASAL SPRAY    2 sprays (100 mcg total) by Each Nostril route once daily.    FLUTICASONE PROPIONATE (FLONASE) 50 MCG/ACTUATION NASAL SPRAY    1 spray (50 mcg total) by Each Nostril route once daily.    KETOCONAZOLE (NIZORAL) 2 % CREAM    Apply topically 2 (two) times daily. Prn facial rash or dryness    LOSARTAN (COZAAR) 100 MG TABLET    Take 1 tablet (100 mg total) by mouth once daily.    METFORMIN (GLUCOPHAGE-XR) 500 MG ER 24HR TABLET    Take 2 tablets (1,000 mg total) by mouth daily with dinner or evening meal.    NITROGLYCERIN (NITROSTAT) 0.4 MG SL TABLET    Place 1 tablet (0.4 mg total) under the tongue every 5 (five) minutes as needed for Chest pain.    OMEPRAZOLE (PRILOSEC) 20 MG CAPSULE    Take 1 capsule (20 mg total) by mouth once daily. for 14 days    POTASSIUM CHLORIDE (K-TAB) 20 MEQ    Take 1 tablet (20 mEq total) by mouth  once daily.    SEMAGLUTIDE, WEIGHT LOSS, (WEGOVY) 0.25 MG/0.5 ML PNIJ    Inject 0.25 mg into the skin every 7 days. After 4 weeks increase to 0.5 mg per week dose    SEMAGLUTIDE, WEIGHT LOSS, (WEGOVY) 0.5 MG/0.5 ML PNIJ    Inject 0.5 mg into the skin every 7 days.    TRIAMCINOLONE ACETONIDE TOP       Modified Medications    No medications on file   Discontinued Medications    No medications on file

## 2023-01-26 NOTE — LETTER
January 26, 2023      [facility]  [address], [address]  [place] [address]  Phone: [phone]       Patient: Lizet Palencia   YOB: 1970  Date of Visit: 01/26/2023    To Whom It May Concern:    Rohan Palencia  was at [facility] on 01/26/2023. The patient may return to work/school on 01/27/2023 with no restrictions. If you have any questions or concerns, or if I can be of further assistance, please do not hesitate to contact me.    Sincerely,    Lasha Cox MD

## 2023-01-31 ENCOUNTER — TELEPHONE (OUTPATIENT)
Dept: FAMILY MEDICINE | Facility: CLINIC | Age: 53
End: 2023-01-31
Payer: OTHER GOVERNMENT

## 2023-01-31 NOTE — TELEPHONE ENCOUNTER
----- Message from Jack Booker sent at 1/30/2023 11:37 AM CST -----  Type:  Needs Medical Advice    Who Called: pt  Would the patient rather a call back or a response via [facility]? call  Best Call Back Number: [phone]  Additional Information: Pt calling in regards to medication  semaglutide, weight loss, (WEGOVY) 0.5 mg/0.5 mL PnIj pt states that pharmacy is needing more information pharmacy sent message to office an is still awaiting an response please call pt  and pharmacy

## 2023-01-31 NOTE — TELEPHONE ENCOUNTER
Called pt about her message. Pt stated that the pharmacy needs more information. Was not able to tell me what they needed. Will call pharmacy

## 2023-03-06 ENCOUNTER — TELEPHONE (OUTPATIENT)
Dept: FAMILY MEDICINE | Facility: CLINIC | Age: 53
End: 2023-03-06
Payer: OTHER GOVERNMENT

## 2023-03-06 NOTE — TELEPHONE ENCOUNTER
----- Message from Alfred Yung sent at 3/3/2023  3:25 PM CST -----  Type:  Needs Medical Advice    Who Called: self  Reason:left a message weeks ago ad no one ever called   Would the patient rather a call back or a response via [facility]? call  Best Call Back Number: [phone]  Additional Information: mary

## 2023-03-06 NOTE — TELEPHONE ENCOUNTER
----- Message from Alfred Yung sent at 3/3/2023  3:25 PM CST -----  Type:  Needs Medical Advice    Who Called: self  Reason:left a message weeks ago ad no one ever called   Would the patient rather a call back or a response via [facility]? call  Best Call Back Number: [phone]  Additional Information: mary

## 2023-03-06 NOTE — TELEPHONE ENCOUNTER
Returned patient's call. She stated that she needs a prior auth for her Wegovy prescription. Submitted the PA via [employer].

## 2023-03-09 ENCOUNTER — TELEPHONE (OUTPATIENT)
Dept: FAMILY MEDICINE | Facility: CLINIC | Age: 53
End: 2023-03-09
Payer: OTHER GOVERNMENT

## 2023-03-09 NOTE — TELEPHONE ENCOUNTER
----- Message from Tonia Rudd sent at 3/9/2023 10:05 AM CST -----  Type:  Needs Medical Advice    Who Called: pt  Symptoms (please be specific): pt following up on pre authorization form this has been going on since January 17 on the medication.  Wants to make sure its been sent   Would the patient rather a call back or a response via [facility]? call  Best Call Back Number: [phone]   Additional Information:

## 2023-04-04 ENCOUNTER — TELEPHONE (OUTPATIENT)
Dept: FAMILY MEDICINE | Facility: CLINIC | Age: 53
End: 2023-04-04
Payer: OTHER GOVERNMENT

## 2023-04-18 ENCOUNTER — LAB VISIT (OUTPATIENT)
Dept: LAB | Facility: HOSPITAL | Age: 53
End: 2023-04-18
Attending: FAMILY MEDICINE
Payer: OTHER GOVERNMENT

## 2023-04-18 DIAGNOSIS — R73.03 PREDIABETES: ICD-10-CM

## 2023-04-18 DIAGNOSIS — I25.10 CORONARY ARTERY DISEASE INVOLVING NATIVE CORONARY ARTERY OF NATIVE HEART WITHOUT ANGINA PECTORIS: ICD-10-CM

## 2023-04-18 LAB
ALBUMIN SERPL BCP-MCNC: 3.5 G/DL (ref 3.5–5.2)
ALP SERPL-CCNC: 40 U/L (ref 55–135)
ALT SERPL W/O P-5'-P-CCNC: 16 U/L (ref 10–44)
ANION GAP SERPL CALC-SCNC: 5 MMOL/L (ref 8–16)
AST SERPL-CCNC: 16 U/L (ref 10–40)
BILIRUB SERPL-MCNC: 0.5 MG/DL (ref 0.1–1)
BUN SERPL-MCNC: 15 MG/DL (ref 6–20)
CALCIUM SERPL-MCNC: 9 MG/DL (ref 8.7–10.5)
CHLORIDE SERPL-SCNC: 107 MMOL/L (ref 95–110)
CHOLEST SERPL-MCNC: 153 MG/DL (ref 120–199)
CHOLEST/HDLC SERPL: 3.5 {RATIO} (ref 2–5)
CO2 SERPL-SCNC: 28 MMOL/L (ref 23–29)
CREAT SERPL-MCNC: 0.8 MG/DL (ref 0.5–1.4)
EST. GFR  (NO RACE VARIABLE): >60 ML/MIN/1.73 M^2
ESTIMATED AVG GLUCOSE: 123 MG/DL (ref 68–131)
GLUCOSE SERPL-MCNC: 108 MG/DL (ref 70–110)
HBA1C MFR BLD: 5.9 % (ref 4–5.6)
HDLC SERPL-MCNC: 44 MG/DL (ref 40–75)
HDLC SERPL: 28.8 % (ref 20–50)
LDLC SERPL CALC-MCNC: 99.4 MG/DL (ref 63–159)
NONHDLC SERPL-MCNC: 109 MG/DL
POTASSIUM SERPL-SCNC: 3.6 MMOL/L (ref 3.5–5.1)
PROT SERPL-MCNC: 6.7 G/DL (ref 6–8.4)
SODIUM SERPL-SCNC: 140 MMOL/L (ref 136–145)
TRIGL SERPL-MCNC: 48 MG/DL (ref 30–150)

## 2023-04-18 PROCEDURE — 80053 COMPREHEN METABOLIC PANEL: CPT | Performed by: FAMILY MEDICINE

## 2023-04-18 PROCEDURE — 36415 COLL VENOUS BLD VENIPUNCTURE: CPT | Performed by: FAMILY MEDICINE

## 2023-04-18 PROCEDURE — 83036 HEMOGLOBIN GLYCOSYLATED A1C: CPT | Performed by: FAMILY MEDICINE

## 2023-04-18 PROCEDURE — 80061 LIPID PANEL: CPT | Performed by: FAMILY MEDICINE

## 2023-04-24 ENCOUNTER — OFFICE VISIT (OUTPATIENT)
Dept: FAMILY MEDICINE | Facility: CLINIC | Age: 53
End: 2023-04-24
Payer: OTHER GOVERNMENT

## 2023-04-24 VITALS
BODY MASS INDEX: 32.48 KG/M2 | TEMPERATURE: 98 F | SYSTOLIC BLOOD PRESSURE: 138 MMHG | HEIGHT: 70 IN | HEART RATE: 73 BPM | WEIGHT: 226.88 LBS | DIASTOLIC BLOOD PRESSURE: 92 MMHG | OXYGEN SATURATION: 98 %

## 2023-04-24 DIAGNOSIS — Z79.82 LONG-TERM USE OF ASPIRIN THERAPY: ICD-10-CM

## 2023-04-24 DIAGNOSIS — I25.2 HISTORY OF NON-ST ELEVATION MYOCARDIAL INFARCTION (NSTEMI): ICD-10-CM

## 2023-04-24 DIAGNOSIS — I25.10 CORONARY ARTERY DISEASE INVOLVING NATIVE CORONARY ARTERY OF NATIVE HEART WITHOUT ANGINA PECTORIS: ICD-10-CM

## 2023-04-24 DIAGNOSIS — Z23 NEED FOR SHINGLES VACCINE: ICD-10-CM

## 2023-04-24 DIAGNOSIS — J30.9 ALLERGIC RHINITIS, UNSPECIFIED SEASONALITY, UNSPECIFIED TRIGGER: ICD-10-CM

## 2023-04-24 DIAGNOSIS — Z00.00 ROUTINE GENERAL MEDICAL EXAMINATION AT A HEALTH CARE FACILITY: Primary | ICD-10-CM

## 2023-04-24 DIAGNOSIS — I10 ESSENTIAL HYPERTENSION: ICD-10-CM

## 2023-04-24 DIAGNOSIS — E55.9 VITAMIN D DEFICIENCY: ICD-10-CM

## 2023-04-24 DIAGNOSIS — Z95.5 STATUS POST CORONARY ARTERY STENT PLACEMENT: ICD-10-CM

## 2023-04-24 DIAGNOSIS — E66.01 CLASS 2 SEVERE OBESITY DUE TO EXCESS CALORIES WITH SERIOUS COMORBIDITY AND BODY MASS INDEX (BMI) OF 35.0 TO 35.9 IN ADULT: ICD-10-CM

## 2023-04-24 DIAGNOSIS — I10 ACCELERATED HYPERTENSION: ICD-10-CM

## 2023-04-24 DIAGNOSIS — Z79.899 MEDICATION MANAGEMENT: ICD-10-CM

## 2023-04-24 DIAGNOSIS — T50.2X5A DIURETIC-INDUCED HYPOKALEMIA: ICD-10-CM

## 2023-04-24 DIAGNOSIS — E87.6 DIURETIC-INDUCED HYPOKALEMIA: ICD-10-CM

## 2023-04-24 DIAGNOSIS — R73.03 PREDIABETES: ICD-10-CM

## 2023-04-24 PROCEDURE — 99396 PREV VISIT EST AGE 40-64: CPT | Mod: S$PBB,,, | Performed by: FAMILY MEDICINE

## 2023-04-24 PROCEDURE — 99396 PR PREVENTIVE VISIT,EST,40-64: ICD-10-PCS | Mod: S$PBB,,, | Performed by: FAMILY MEDICINE

## 2023-04-24 PROCEDURE — 99215 OFFICE O/P EST HI 40 MIN: CPT | Mod: PBBFAC,PO | Performed by: FAMILY MEDICINE

## 2023-04-24 PROCEDURE — 99999 PR PBB SHADOW E&M-EST. PATIENT-LVL V: ICD-10-PCS | Mod: PBBFAC,,, | Performed by: FAMILY MEDICINE

## 2023-04-24 PROCEDURE — 99999 PR PBB SHADOW E&M-EST. PATIENT-LVL V: CPT | Mod: PBBFAC,,, | Performed by: FAMILY MEDICINE

## 2023-04-24 RX ORDER — POTASSIUM CHLORIDE 1500 MG/1
20 TABLET, EXTENDED RELEASE ORAL DAILY
Qty: 90 TABLET | Refills: 4 | Status: SHIPPED | OUTPATIENT
Start: 2023-04-24

## 2023-04-24 RX ORDER — SEMAGLUTIDE 0.25 MG/.5ML
0.25 INJECTION, SOLUTION SUBCUTANEOUS
Qty: 2 ML | Refills: 1 | Status: SHIPPED | OUTPATIENT
Start: 2023-04-24 | End: 2023-04-24 | Stop reason: SDUPTHER

## 2023-04-24 RX ORDER — SEMAGLUTIDE 0.25 MG/.5ML
0.25 INJECTION, SOLUTION SUBCUTANEOUS
Qty: 2 ML | Refills: 1 | Status: SHIPPED | OUTPATIENT
Start: 2023-04-24 | End: 2023-08-25 | Stop reason: SDUPTHER

## 2023-04-24 NOTE — PROGRESS NOTES
Office Visit    Patient Name: Lizet Palencia    : 1970  MRN: [id number]    Subjective:  Lizet is a 53 y.o. female who presents today for:    Annual Exam    MOST RECENT OFFICE VISIT WITH ME 2023   Prior physical 22    Lizet Palencia is a 52 yo AA female patient of mine who presents for annual physical and lab review.   Had Colonoscopy 23     Chronic conditions include CAD w/ h/o stent/NSTEMI, HTN, prediabetes(A1c 6.3 22), HLD, Obesity.   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  She likes her job but it is sedentary.  She is in school and for her masters of social work-- studying is sedentary.  She will graduate this May.     She was previously prescribed Metformin 2 500 XR TABS daily for mgmt of of prediabetes and weight gain.  Had GI SEs and therefore was advised to cut back to 1 tab daily-- has been taking 500XR nightly.  Was unable to obtain Wegovy.      A1c 23 5.9, unremarkable CMP, LDL 99, Vit D previously 29 (inconsistent about supplement).     Has normal menstrual cycles still with no perimenopausal symptoms,.      General lifestyle habits are as follows:   Diet: fair- eats out several nights of the week, but she tries to make healthier low carb choices that include salads and seafood /lean meat & drinking more water. Has cut back significantly on alcohol -- about 3 nights per week instead of nightly.   Exercise: poor - recently very sedentary, not exercising and sitting with her school work and job.   Sleep: good no concerns  Weight: recently down a few lbs from BMI 36-->35     Immunizations: declines FLU shot, TDaP 10/25/2017, COVID-19 VACCINE SERIES COMPLETED 2/3/21 w/ Pfizer BOOSTER 22, SHINGRIX ADVISED     Screening Tests: mammogram 22-- repeat 1 year,  PAP/HPV WNL/(-) 21- REPEAT 5 YRS, HIV (-) 3/16/20, Hep C (-) 3/16/20,  colonoscopy 23-- repeat 10 years     Eye/Dental: reports up to date with both     Saw Dr Cox 23:    - Continue OMT with ASA and Statin  - stable coronary artery disease  -  stress MPI this year with no ischemia.  -  nitroglycerin p.r.n..    - Target LDL < 70 ,  Continue Zetial and Lipitor 80 mg qhs We.  We discussed PCSK 9 inhibitors. At this time would stick with current tx and lifestyle changes.      She has not needed the Nitroglycerin.             PAST MEDICAL HISTORY, SURGICAL/SOCIAL/FAMILY HISTORY REVIEWED AS PER CHART, WITH PERTINENT FINDINGS INCLUDED IN HISTORY SECTION OF NOTE.     Current Medications    Medication List with Changes/Refills   New Medications    SEMAGLUTIDE, WEIGHT LOSS, (WEGOVY) 0.25 MG/0.5 ML PNIJ    Inject 0.25 mg into the skin every 7 days.   Current Medications    AMLODIPINE (NORVASC) 10 MG TABLET    Take 1 tablet (10 mg total) by mouth once daily.    ASPIRIN 81 MG CHEW    Take 81 mg by mouth once daily.    ATORVASTATIN (LIPITOR) 80 MG TABLET    Take 1 tablet (80 mg total) by mouth once daily.    BENZONATATE (TESSALON) 100 MG CAPSULE    Take 200 mg by mouth every 8 (eight) hours as needed.    CARVEDILOL (COREG) 25 MG TABLET    TAKE 1 TABLET BY MOUTH TWICE DAILY WITH MEAL    CETIRIZINE (ZYRTEC) 10 MG TABLET    Take 1 tablet (10 mg total) by mouth once daily.    CHLORTHALIDONE (HYGROTEN) 25 MG TAB    Take 1 tablet (25 mg total) by mouth once daily.    CLINDAMYCIN (CLEOCIN T) 1 % LOTION        EZETIMIBE (ZETIA) 10 MG TABLET    Take 1 tablet (10 mg total) by mouth once daily.    FLUTICASONE PROPIONATE (FLONASE) 50 MCG/ACTUATION NASAL SPRAY    1 spray (50 mcg total) by Each Nostril route once daily.    KETOCONAZOLE (NIZORAL) 2 % CREAM    Apply topically 2 (two) times daily. Prn facial rash or dryness    LOSARTAN (COZAAR) 100 MG TABLET    Take 1 tablet (100 mg total) by mouth once daily.    METFORMIN (GLUCOPHAGE-XR) 500 MG ER 24HR TABLET    Take 2 tablets (1,000 mg total) by mouth daily with dinner or evening meal.    NITROGLYCERIN (NITROSTAT) 0.4 MG SL TABLET    Place 1 tablet (0.4 mg  "total) under the tongue every 5 (five) minutes as needed for Chest pain.    TRIAMCINOLONE ACETONIDE TOP       Changed and/or Refilled Medications    Modified Medication Previous Medication    POTASSIUM CHLORIDE (K-TAB) 20 MEQ potassium chloride (K-TAB) 20 mEq       Take 1 tablet (20 mEq total) by mouth once daily.    Take 1 tablet (20 mEq total) by mouth once daily.   Discontinued Medications    FLUTICASONE PROPIONATE (FLONASE) 50 MCG/ACTUATION NASAL SPRAY    2 sprays (100 mcg total) by Each Nostril route once daily.    FLUTICASONE PROPIONATE (FLONASE) 50 MCG/ACTUATION NASAL SPRAY    2 sprays (100 mcg total) by Each Nostril route once daily.    OMEPRAZOLE (PRILOSEC) 20 MG CAPSULE    Take 1 capsule (20 mg total) by mouth once daily. for 14 days    SEMAGLUTIDE, WEIGHT LOSS, (WEGOVY) 0.25 MG/0.5 ML PNIJ    Inject 0.25 mg into the skin every 7 days. After 4 weeks increase to 0.5 mg per week dose    SEMAGLUTIDE, WEIGHT LOSS, (WEGOVY) 0.5 MG/0.5 ML PNIJ    Inject 0.5 mg into the skin every 7 days.       Allergies   Review of patient's allergies indicates:   Allergen Reactions    Crab Rash    Shrimp Rash         Review of Systems (Pertinent positives)  Review of Systems   Constitutional:  Negative for unexpected weight change.   HENT:  Negative for dental problem.    Eyes:  Negative for visual disturbance.   Respiratory:  Negative for chest tightness and shortness of breath.    Cardiovascular:  Negative for chest pain, palpitations and leg swelling.   Gastrointestinal:  Negative for constipation and diarrhea.   Genitourinary:  Negative for difficulty urinating, dysuria, enuresis, hematuria and menstrual problem.   Allergic/Immunologic: Positive for environmental allergies.   Neurological:  Negative for dizziness, light-headedness and headaches.   Psychiatric/Behavioral:  Negative for dysphoric mood and sleep disturbance.      BP (!) 138/92   Pulse 73   Temp 98.3 °F (36.8 °C) (Oral)   Ht 5' 10" (1.778 m)   Wt 102.9 kg " (226 lb 13.7 oz)   LMP 04/05/2023 (Exact Date)   SpO2 98%   BMI 32.55 kg/m²     Physical Exam  Vitals reviewed.   Constitutional:       General: She is not in acute distress.     Appearance: Normal appearance. She is well-developed.   HENT:      Head: Normocephalic and atraumatic.      Right Ear: Ear canal normal. Tympanic membrane is not erythematous or bulging.      Left Ear: Ear canal normal. Tympanic membrane is not erythematous or bulging.      Nose: Nose normal.      Mouth/Throat:      Mouth: Mucous membranes are moist.      Pharynx: No oropharyngeal exudate.   Eyes:      Extraocular Movements: Extraocular movements intact.      Conjunctiva/sclera: Conjunctivae normal.   Neck:      Thyroid: No thyroid mass or thyromegaly.      Vascular: No carotid bruit.   Cardiovascular:      Rate and Rhythm: Normal rate and regular rhythm.      Pulses:           Dorsalis pedis pulses are 2+ on the right side and 2+ on the left side.      Heart sounds: Normal heart sounds. No murmur heard.  Pulmonary:      Effort: Pulmonary effort is normal. No respiratory distress.      Breath sounds: Normal breath sounds.   Abdominal:      General: Bowel sounds are normal. There is no distension.      Palpations: Abdomen is soft. There is no mass.      Tenderness: There is no abdominal tenderness.   Musculoskeletal:         General: Normal range of motion.      Right lower leg: No edema.      Left lower leg: No edema.   Lymphadenopathy:      Cervical: No cervical adenopathy.   Skin:     General: Skin is warm and dry.      Findings: No rash.   Neurological:      General: No focal deficit present.      Mental Status: She is alert and oriented to person, place, and time.   Psychiatric:         Mood and Affect: Mood normal.         Behavior: Behavior normal.         Assessment/Plan:  Lizet Palencia is a 53 y.o. female who presents today for :        ICD-10-CM ICD-9-CM    1. Routine general medical examination at a health care facility   Z00.00 V70.0       2. Class 2 severe obesity due to excess calories with serious comorbidity and body mass index (BMI) of 35.0 to 35.9 in adult  E66.01 278.01 semaglutide, weight loss, (WEGOVY) 0.25 mg/0.5 mL PnIj    Z68.35 V85.35 DISCONTINUED: semaglutide, weight loss, (WEGOVY) 0.25 mg/0.5 mL PnIj      3. Prediabetes  R73.03 790.29 Hemoglobin A1C      Comprehensive Metabolic Panel      Lipid Panel      CBC Auto Differential      TSH      Vitamin D      Vitamin B12      Magnesium      Microalbumin/Creatinine Ratio, Urine      4. Essential hypertension  I10 401.9 Hemoglobin A1C      Comprehensive Metabolic Panel      Lipid Panel      CBC Auto Differential      TSH      Vitamin D      Vitamin B12      Magnesium      Microalbumin/Creatinine Ratio, Urine      5. Diuretic-induced hypokalemia  E87.6 276.8 potassium chloride (K-TAB) 20 mEq    T50.2X5A E944.4 Hemoglobin A1C      Comprehensive Metabolic Panel      Lipid Panel      CBC Auto Differential      TSH      Vitamin D      Vitamin B12      Magnesium      Microalbumin/Creatinine Ratio, Urine      6. History of non-ST elevation myocardial infarction (NSTEMI)  I25.2 412       7. Coronary artery disease involving native coronary artery of native heart without angina pectoris  I25.10 414.01       8. Accelerated hypertension  I10 401.0 Hemoglobin A1C      Comprehensive Metabolic Panel      Lipid Panel      CBC Auto Differential      TSH      Vitamin D      Vitamin B12      Magnesium      Microalbumin/Creatinine Ratio, Urine      9. Long-term use of aspirin therapy  Z79.82 V58.66       10. Status post coronary artery stent placement  Z95.5 V45.82       11. Allergic rhinitis, unspecified seasonality, unspecified trigger  J30.9 477.9       12. Vitamin D deficiency  E55.9 268.9       13. Need for shingles vaccine  Z23 V04.89       14. Medication management  Z79.899 V58.69 Hemoglobin A1C      Comprehensive Metabolic Panel      Lipid Panel      CBC Auto Differential      TSH       Vitamin D      Vitamin B12      Magnesium      Microalbumin/Creatinine Ratio, Urine        ADVISED ON DIET/EXERCISE/SLEEP, ROUTINE EYE/DENTAL EXAMS, AND THE IMPORTANCE OF KEEPING UP WITH APPROPRIATE SCREENING TESTS BASED ON AGE AND RISK FACTORS.  NEXT COLONOSCOPY DUE JANUARY 2033, NEXT MAMMOGRAM DUE 12/20/2023.  TODAY SHE DECLINES SHINGRIX, FLU SHOT, OMICRON COVID-19 BOOSTER BUT WILL CONSIDER THESE MOVING INTO THE FALL/ START OF NEXT FLU SEASON/URI SEASON.       Coronary artery disease status post NSTEMI, stent placement with underlying obesity, hypertension, hyperlipidemia, prediabetes: previously put on about 10 lbs with increase in A1c to 6.3. A1C IMPROVED TO 6.1--> 5.9.  WITH STARTING METFORMIN. Better tolerating 500 XR daily (down from 1000). Would like to try weekly GLP-1 instead, and Wegovy sent to try if it's affordable-- prior issues with prescription, recent to [facility], unable to send to [facility] pharmacy as she has .    Following with Cardiology Dr Cox-- has normal stress test w/ in the year (2/11/22) and no recent NTG use. LDL still greater than 70 at 99 but she declines augmenting therapy at this time.  Could try change to Crestor 40 to see if increased LDL control compared to Lipitor 80.     Continue high dose STATIN with Lipitor 80 + Zetia 10 to achieve LDL goal < 70, ASA 81 mg daily for secondary prevention.   BP controlled on Cozaar 100/ Amlodipine 10./ COREG 25 BID, CHLORTHALIDONE 25 (+ K= supplement)    Back on vitamin-D supplementation, recheck with next blood draw.       Patient Instructions   Order a LOVIA OR OMRON AUTOMATIC ARM BP CUFF-- MEASURE YOUR AREM CIRCUMFERENCE TO ENSURE PROPER CUFF FIT, RECORD HOME BP READINGS    RETURN IN 3-4 WEEKS FOR A NURSE VISIT WITH BP LOG REVIEW AND MACHINE INTERROGATION       Follow up in about 4 months (around 8/24/2023) for to follow up on lab results, return as needed for new concerns.

## 2023-04-24 NOTE — PATIENT INSTRUCTIONS
Order a LOVIA OR OMRON AUTOMATIC ARM BP CUFF-- MEASURE YOUR AREM CIRCUMFERENCE TO ENSURE PROPER CUFF FIT, RECORD HOME BP READINGS    RETURN IN 3-4 WEEKS FOR A NURSE VISIT WITH BP LOG REVIEW AND MACHINE INTERROGATION

## 2023-05-22 ENCOUNTER — CLINICAL SUPPORT (OUTPATIENT)
Dept: FAMILY MEDICINE | Facility: CLINIC | Age: 53
End: 2023-05-22
Payer: OTHER GOVERNMENT

## 2023-05-22 VITALS
SYSTOLIC BLOOD PRESSURE: 136 MMHG | BODY MASS INDEX: 32.83 KG/M2 | WEIGHT: 228.81 LBS | DIASTOLIC BLOOD PRESSURE: 98 MMHG

## 2023-05-22 PROCEDURE — 99999 PR PBB SHADOW E&M-EST. PATIENT-LVL II: ICD-10-PCS | Mod: PBBFAC,,,

## 2023-05-22 PROCEDURE — 99999 PR PBB SHADOW E&M-EST. PATIENT-LVL II: CPT | Mod: PBBFAC,,,

## 2023-05-22 NOTE — PROGRESS NOTES
Pt came in for bp check, pt stated she forgot to take her medication. BP was elevated. Rescheduled bp check and told pt to take her medication prior to her appointment  
22-Jun-2022

## 2023-07-25 ENCOUNTER — TELEPHONE (OUTPATIENT)
Dept: CARDIOLOGY | Facility: CLINIC | Age: 53
End: 2023-07-25
Payer: OTHER GOVERNMENT

## 2023-07-25 DIAGNOSIS — E66.01 CLASS 2 SEVERE OBESITY DUE TO EXCESS CALORIES WITH SERIOUS COMORBIDITY AND BODY MASS INDEX (BMI) OF 35.0 TO 35.9 IN ADULT: ICD-10-CM

## 2023-07-25 DIAGNOSIS — I25.10 CORONARY ARTERY DISEASE INVOLVING NATIVE CORONARY ARTERY OF NATIVE HEART WITHOUT ANGINA PECTORIS: ICD-10-CM

## 2023-07-25 DIAGNOSIS — I10 ACCELERATED HYPERTENSION: ICD-10-CM

## 2023-07-25 DIAGNOSIS — I25.2 HISTORY OF NON-ST ELEVATION MYOCARDIAL INFARCTION (NSTEMI): ICD-10-CM

## 2023-07-25 DIAGNOSIS — R73.03 PREDIABETES: ICD-10-CM

## 2023-07-25 RX ORDER — LOSARTAN POTASSIUM 100 MG/1
TABLET ORAL
Refills: 0 | OUTPATIENT
Start: 2023-07-25

## 2023-07-25 RX ORDER — SEMAGLUTIDE 0.25 MG/.5ML
INJECTION, SOLUTION SUBCUTANEOUS
Refills: 0 | OUTPATIENT
Start: 2023-07-25

## 2023-07-25 RX ORDER — POTASSIUM CHLORIDE 20 MEQ/1
TABLET, EXTENDED RELEASE ORAL
Refills: 0 | OUTPATIENT
Start: 2023-07-25

## 2023-07-25 RX ORDER — CHLORTHALIDONE 25 MG/1
TABLET ORAL
Refills: 0 | OUTPATIENT
Start: 2023-07-25

## 2023-07-25 RX ORDER — ATORVASTATIN CALCIUM 80 MG/1
TABLET, FILM COATED ORAL
Refills: 0 | OUTPATIENT
Start: 2023-07-25

## 2023-07-25 RX ORDER — METFORMIN HYDROCHLORIDE 500 MG/1
TABLET, EXTENDED RELEASE ORAL
Refills: 0 | OUTPATIENT
Start: 2023-07-25

## 2023-07-25 RX ORDER — AMLODIPINE BESYLATE 10 MG/1
TABLET ORAL
Refills: 0 | OUTPATIENT
Start: 2023-07-25

## 2023-07-25 RX ORDER — CARVEDILOL 25 MG/1
TABLET ORAL
Refills: 0 | OUTPATIENT
Start: 2023-07-25

## 2023-07-25 NOTE — TELEPHONE ENCOUNTER
Refill Decision Note   Lizet Andrea  is requesting a refill authorization.  Brief Assessment and Rationale for Refill:  Route     Medication Therapy Plan:    Pharmacy is requesting new scripts for the following medications without required information, (sig/ frequency/qty/etc)      Medication Reconciliation Completed: No     Comments: Pharmacies have been requesting medications for patients without required information, (sig, frequency, qty, etc.). In addition, requests are sent for medication(s) pt. are currently not taking, and medications patients have never taken.    We have spoken to the pharmacies about these request types and advised their teams previously that we are unable to assess these New Script requests and require all details for these requests. This is a known issue and has been reported.     Note composed:2:23 PM 07/25/2023

## 2023-07-25 NOTE — TELEPHONE ENCOUNTER
Care Due:                  Date            Visit Type   Department     Provider  --------------------------------------------------------------------------------                                EP -                              [facility]  Last Visit: 04-      CARE ([facility])   MAI Louis                              [facility]  Next Visit: 08-      CARE ([facility])   MAI Louis                                                            Last  Test          Frequency    Reason                     Performed    Due Date  --------------------------------------------------------------------------------    HBA1C.......  6 months...  metFORMIN, semaglutide,..  04-   10-    Health [facility] Embedded Care Due Messages. Reference number: [id number].   7/25/2023 2:20:45 PM CDT

## 2023-07-26 DIAGNOSIS — I25.10 CORONARY ARTERY DISEASE INVOLVING NATIVE CORONARY ARTERY OF NATIVE HEART WITHOUT ANGINA PECTORIS: ICD-10-CM

## 2023-07-26 RX ORDER — EZETIMIBE 10 MG/1
10 TABLET ORAL DAILY
Qty: 90 TABLET | Refills: 3 | Status: SHIPPED | OUTPATIENT
Start: 2023-07-26 | End: 2024-07-25

## 2023-07-26 RX ORDER — EZETIMIBE 10 MG/1
10 TABLET ORAL DAILY
Qty: 90 TABLET | Refills: 3 | Status: SHIPPED | OUTPATIENT
Start: 2023-07-26 | End: 2023-07-26 | Stop reason: SDUPTHER

## 2023-08-11 ENCOUNTER — PATIENT OUTREACH (OUTPATIENT)
Dept: ADMINISTRATIVE | Facility: HOSPITAL | Age: 53
End: 2023-08-11
Payer: OTHER GOVERNMENT

## 2023-08-11 NOTE — PROGRESS NOTES
Care Everywhere updates requested and reviewed.  Immunizations reconciled. Media reports reviewed.  Duplicate HM overrides and  orders removed.  Overdue HM topic chart audit and/or requested.  Overdue lab testing linked to upcoming lab appointments if applies.      Health Maintenance Due   Topic Date Due    Shingles Vaccine (1 of 2) Never done    COVID-19 Vaccine (4 - Pfizer series) 02/15/2022

## 2023-08-24 ENCOUNTER — LAB VISIT (OUTPATIENT)
Dept: LAB | Facility: HOSPITAL | Age: 53
End: 2023-08-24
Attending: FAMILY MEDICINE
Payer: OTHER GOVERNMENT

## 2023-08-24 DIAGNOSIS — I10 ESSENTIAL HYPERTENSION: ICD-10-CM

## 2023-08-24 DIAGNOSIS — I10 ACCELERATED HYPERTENSION: ICD-10-CM

## 2023-08-24 DIAGNOSIS — Z79.899 MEDICATION MANAGEMENT: ICD-10-CM

## 2023-08-24 DIAGNOSIS — T50.2X5A DIURETIC-INDUCED HYPOKALEMIA: ICD-10-CM

## 2023-08-24 DIAGNOSIS — E87.6 DIURETIC-INDUCED HYPOKALEMIA: ICD-10-CM

## 2023-08-24 DIAGNOSIS — R73.03 PREDIABETES: ICD-10-CM

## 2023-08-24 LAB
25(OH)D3+25(OH)D2 SERPL-MCNC: 23 NG/ML (ref 30–96)
ALBUMIN SERPL BCP-MCNC: 3.5 G/DL (ref 3.5–5.2)
ALP SERPL-CCNC: 47 U/L (ref 55–135)
ALT SERPL W/O P-5'-P-CCNC: 12 U/L (ref 10–44)
ANION GAP SERPL CALC-SCNC: 10 MMOL/L (ref 8–16)
AST SERPL-CCNC: 15 U/L (ref 10–40)
BASOPHILS # BLD AUTO: 0.04 K/UL (ref 0–0.2)
BASOPHILS NFR BLD: 0.9 % (ref 0–1.9)
BILIRUB SERPL-MCNC: 0.5 MG/DL (ref 0.1–1)
BUN SERPL-MCNC: 14 MG/DL (ref 6–20)
CALCIUM SERPL-MCNC: 9.3 MG/DL (ref 8.7–10.5)
CHLORIDE SERPL-SCNC: 103 MMOL/L (ref 95–110)
CHOLEST SERPL-MCNC: 128 MG/DL (ref 120–199)
CHOLEST/HDLC SERPL: 2.7 {RATIO} (ref 2–5)
CO2 SERPL-SCNC: 27 MMOL/L (ref 23–29)
CREAT SERPL-MCNC: 0.8 MG/DL (ref 0.5–1.4)
DIFFERENTIAL METHOD: ABNORMAL
EOSINOPHIL # BLD AUTO: 0.1 K/UL (ref 0–0.5)
EOSINOPHIL NFR BLD: 1.5 % (ref 0–8)
ERYTHROCYTE [DISTWIDTH] IN BLOOD BY AUTOMATED COUNT: 12.9 % (ref 11.5–14.5)
EST. GFR  (NO RACE VARIABLE): >60 ML/MIN/1.73 M^2
ESTIMATED AVG GLUCOSE: 123 MG/DL (ref 68–131)
GLUCOSE SERPL-MCNC: 101 MG/DL (ref 70–110)
HBA1C MFR BLD: 5.9 % (ref 4–5.6)
HCT VFR BLD AUTO: 42.4 % (ref 37–48.5)
HDLC SERPL-MCNC: 48 MG/DL (ref 40–75)
HDLC SERPL: 37.5 % (ref 20–50)
HGB BLD-MCNC: 13.5 G/DL (ref 12–16)
IMM GRANULOCYTES # BLD AUTO: 0 K/UL (ref 0–0.04)
IMM GRANULOCYTES NFR BLD AUTO: 0 % (ref 0–0.5)
LDLC SERPL CALC-MCNC: 68.2 MG/DL (ref 63–159)
LYMPHOCYTES # BLD AUTO: 2.1 K/UL (ref 1–4.8)
LYMPHOCYTES NFR BLD: 45.7 % (ref 18–48)
MAGNESIUM SERPL-MCNC: 1.8 MG/DL (ref 1.6–2.6)
MCH RBC QN AUTO: 29.9 PG (ref 27–31)
MCHC RBC AUTO-ENTMCNC: 31.8 G/DL (ref 32–36)
MCV RBC AUTO: 94 FL (ref 82–98)
MONOCYTES # BLD AUTO: 0.6 K/UL (ref 0.3–1)
MONOCYTES NFR BLD: 12.4 % (ref 4–15)
NEUTROPHILS # BLD AUTO: 1.8 K/UL (ref 1.8–7.7)
NEUTROPHILS NFR BLD: 39.5 % (ref 38–73)
NONHDLC SERPL-MCNC: 80 MG/DL
NRBC BLD-RTO: 0 /100 WBC
PLATELET # BLD AUTO: 195 K/UL (ref 150–450)
PMV BLD AUTO: 11.8 FL (ref 9.2–12.9)
POTASSIUM SERPL-SCNC: 3.3 MMOL/L (ref 3.5–5.1)
PROT SERPL-MCNC: 7.2 G/DL (ref 6–8.4)
RBC # BLD AUTO: 4.51 M/UL (ref 4–5.4)
SODIUM SERPL-SCNC: 140 MMOL/L (ref 136–145)
TRIGL SERPL-MCNC: 59 MG/DL (ref 30–150)
TSH SERPL DL<=0.005 MIU/L-ACNC: 1.56 UIU/ML (ref 0.4–4)
VIT B12 SERPL-MCNC: 422 PG/ML (ref 210–950)
WBC # BLD AUTO: 4.53 K/UL (ref 3.9–12.7)

## 2023-08-24 PROCEDURE — 85025 COMPLETE CBC W/AUTO DIFF WBC: CPT | Performed by: FAMILY MEDICINE

## 2023-08-24 PROCEDURE — 80053 COMPREHEN METABOLIC PANEL: CPT | Performed by: FAMILY MEDICINE

## 2023-08-24 PROCEDURE — 82306 VITAMIN D 25 HYDROXY: CPT | Performed by: FAMILY MEDICINE

## 2023-08-24 PROCEDURE — 80061 LIPID PANEL: CPT | Performed by: FAMILY MEDICINE

## 2023-08-24 PROCEDURE — 36415 COLL VENOUS BLD VENIPUNCTURE: CPT | Mod: PO | Performed by: FAMILY MEDICINE

## 2023-08-24 PROCEDURE — 83735 ASSAY OF MAGNESIUM: CPT | Performed by: FAMILY MEDICINE

## 2023-08-24 PROCEDURE — 83036 HEMOGLOBIN GLYCOSYLATED A1C: CPT | Performed by: FAMILY MEDICINE

## 2023-08-24 PROCEDURE — 82607 VITAMIN B-12: CPT | Performed by: FAMILY MEDICINE

## 2023-08-24 PROCEDURE — 84443 ASSAY THYROID STIM HORMONE: CPT | Performed by: FAMILY MEDICINE

## 2023-08-25 ENCOUNTER — OFFICE VISIT (OUTPATIENT)
Dept: FAMILY MEDICINE | Facility: CLINIC | Age: 53
End: 2023-08-25
Payer: OTHER GOVERNMENT

## 2023-08-25 VITALS
WEIGHT: 225.31 LBS | HEART RATE: 86 BPM | SYSTOLIC BLOOD PRESSURE: 132 MMHG | HEIGHT: 70 IN | BODY MASS INDEX: 32.26 KG/M2 | OXYGEN SATURATION: 98 % | DIASTOLIC BLOOD PRESSURE: 88 MMHG | RESPIRATION RATE: 18 BRPM

## 2023-08-25 DIAGNOSIS — R73.03 PREDIABETES: ICD-10-CM

## 2023-08-25 DIAGNOSIS — E87.6 DIURETIC-INDUCED HYPOKALEMIA: ICD-10-CM

## 2023-08-25 DIAGNOSIS — Z79.82 LONG-TERM USE OF ASPIRIN THERAPY: ICD-10-CM

## 2023-08-25 DIAGNOSIS — I25.10 CORONARY ARTERY DISEASE INVOLVING NATIVE CORONARY ARTERY OF NATIVE HEART WITHOUT ANGINA PECTORIS: ICD-10-CM

## 2023-08-25 DIAGNOSIS — Z12.31 ENCOUNTER FOR SCREENING MAMMOGRAM FOR BREAST CANCER: ICD-10-CM

## 2023-08-25 DIAGNOSIS — I10 ESSENTIAL HYPERTENSION: ICD-10-CM

## 2023-08-25 DIAGNOSIS — I25.2 HISTORY OF NON-ST ELEVATION MYOCARDIAL INFARCTION (NSTEMI): ICD-10-CM

## 2023-08-25 DIAGNOSIS — I10 ACCELERATED HYPERTENSION: ICD-10-CM

## 2023-08-25 DIAGNOSIS — Z23 NEED FOR SHINGLES VACCINE: Primary | ICD-10-CM

## 2023-08-25 DIAGNOSIS — Z95.5 STATUS POST CORONARY ARTERY STENT PLACEMENT: ICD-10-CM

## 2023-08-25 DIAGNOSIS — Z79.899 MEDICATION MANAGEMENT: ICD-10-CM

## 2023-08-25 DIAGNOSIS — T50.2X5A DIURETIC-INDUCED HYPOKALEMIA: ICD-10-CM

## 2023-08-25 DIAGNOSIS — E55.9 VITAMIN D DEFICIENCY: ICD-10-CM

## 2023-08-25 DIAGNOSIS — E66.09 CLASS 1 OBESITY DUE TO EXCESS CALORIES WITH SERIOUS COMORBIDITY AND BODY MASS INDEX (BMI) OF 32.0 TO 32.9 IN ADULT: ICD-10-CM

## 2023-08-25 PROCEDURE — 99999 PR PBB SHADOW E&M-EST. PATIENT-LVL V: ICD-10-PCS | Mod: PBBFAC,,, | Performed by: FAMILY MEDICINE

## 2023-08-25 PROCEDURE — 99215 OFFICE O/P EST HI 40 MIN: CPT | Mod: 25,PBBFAC,PO | Performed by: FAMILY MEDICINE

## 2023-08-25 PROCEDURE — 90750 HZV VACC RECOMBINANT IM: CPT | Mod: PBBFAC,PO

## 2023-08-25 PROCEDURE — 99999PBSHW ZOSTER RECOMBINANT VACCINE: ICD-10-PCS | Mod: PBBFAC,,,

## 2023-08-25 PROCEDURE — 90471 IMMUNIZATION ADMIN: CPT | Mod: PBBFAC,PO

## 2023-08-25 PROCEDURE — 99999PBSHW ZOSTER RECOMBINANT VACCINE: Mod: PBBFAC,,,

## 2023-08-25 PROCEDURE — 99214 OFFICE O/P EST MOD 30 MIN: CPT | Mod: S$PBB,,, | Performed by: FAMILY MEDICINE

## 2023-08-25 PROCEDURE — 99214 PR OFFICE/OUTPT VISIT, EST, LEVL IV, 30-39 MIN: ICD-10-PCS | Mod: S$PBB,,, | Performed by: FAMILY MEDICINE

## 2023-08-25 PROCEDURE — 99999 PR PBB SHADOW E&M-EST. PATIENT-LVL V: CPT | Mod: PBBFAC,,, | Performed by: FAMILY MEDICINE

## 2023-08-25 RX ORDER — SEMAGLUTIDE 0.25 MG/.5ML
0.25 INJECTION, SOLUTION SUBCUTANEOUS
Qty: 2 ML | Refills: 1 | Status: SHIPPED | OUTPATIENT
Start: 2023-08-25 | End: 2023-09-08 | Stop reason: SDUPTHER

## 2023-08-25 RX ORDER — CHOLECALCIFEROL (VITAMIN D3) 125 MCG
5000 CAPSULE ORAL
Qty: 100 CAPSULE | Refills: 4 | Status: SHIPPED | OUTPATIENT
Start: 2023-08-25 | End: 2024-03-14 | Stop reason: SDUPTHER

## 2023-08-25 NOTE — PROGRESS NOTES
Office Visit    Patient Name: Lizet Andrea    : 1970  MRN: [id number]    Subjective:  Lizet is a 53 y.o. female who presents today for:    Follow-up    ANNUAL PHYSICAL 23  CARDIOLOGY DR DUNN 23     Lizet Palencia is a 54 yo AA female patient of mine who presents for 4 month follow up with lab review.      Chronic conditions include CAD w/ h/o stent/NSTEMI, HTN, prediabetes(A1c 6.3 22, now improved to 5.9), HLD, Obesity.   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   Since graduating with her master's of social work May 2023 she now job as  for childhood trafficking  This new job is not quite as sedentary.    She was previously prescribed Metformin 2 500 XR TABS daily for mgmt of of prediabetes and weight gain.  Had GI SEs and therefore was advised to cut back to 1 tab daily-- has been taking 500XR nightly.  Was unable to obtain Wegovy.   She now has another insurance and would like me to try to send Wegovy again.     Labs 23 w/ A1c table at 5.9, unremarkable CMP except for K+ 3.3, LDL 68, Vit D 23 down from 29 ( she has not been taking her vitamin-D supplement).   Prescribed K+ 20 mEQ daily but she has not been taking this consistently.       Has normal menstrual cycles still with no perimenopausal symptoms.      General lifestyle habits are as follows:   Diet: fair- eats out several nights of the week, but she tries to make healthier low carb choices that include salads and seafood /lean meat & drinking more water. Has cut back significantly on alcohol -- about 3 nights per week instead of nightly.   Exercise:  not exercising trying to walk more during the day but acknowledges she needs a more formal routine.  Sleep: good no concerns, trying to improve her sleep schedule  Weight:  Stable in the last 4 months.     Immunizations: declines FLU shot, TDaP 10/25/2017, COVID-19 VACCINE SERIES COMPLETED 2/3/21  w/ Pfizer BOOSTER 12/21/22, SHINGRIX 1/2 today 8/25/23     Screening Tests: mammogram 12/14/22-- repeat 1 year & ORDERED,  PAP/HPV WNL/(-) 4/28/21- REPEAT 5 YRS, HIV (-) 3/16/20, Hep C (-) 3/16/20,  colonoscopy 1/19/23-- repeat 10 years     Eye/Dental: reports up to date with both     Saw Dr Cox 1/26/23:   - Continue OMT with ASA and Statin  - stable coronary artery disease  -  stress MPI this year with no ischemia.  -  nitroglycerin p.r.n..    - Target LDL < 70 ,  Continue Zetia and Lipitor 80 mg qhs We.  We discussed PCSK 9 inhibitors. At this time would stick with current tx and lifestyle changes.   - HER CURRENT LDL is 68.      She has not needed the Nitroglycerin.      BP MEDS: LOSARTAN 100, COREG 25BID, AMLODIPINE 10, CHLORTHALIDONE 25.    PAST MEDICAL HISTORY, SURGICAL/SOCIAL/FAMILY HISTORY REVIEWED AS PER CHART, WITH PERTINENT FINDINGS INCLUDED IN HISTORY SECTION OF NOTE.     Current Medications    Medication List with Changes/Refills   Current Medications    AMLODIPINE (NORVASC) 10 MG TABLET    Take 1 tablet (10 mg total) by mouth once daily.    ASPIRIN 81 MG CHEW    Take 81 mg by mouth once daily.    ATORVASTATIN (LIPITOR) 80 MG TABLET    Take 1 tablet (80 mg total) by mouth once daily.    BENZONATATE (TESSALON) 100 MG CAPSULE    Take 200 mg by mouth every 8 (eight) hours as needed.    CARVEDILOL (COREG) 25 MG TABLET    TAKE 1 TABLET BY MOUTH TWICE DAILY WITH MEAL    CETIRIZINE (ZYRTEC) 10 MG TABLET    Take 1 tablet (10 mg total) by mouth once daily.    CHLORTHALIDONE (HYGROTEN) 25 MG TAB    Take 1 tablet (25 mg total) by mouth once daily.    CLINDAMYCIN (CLEOCIN T) 1 % LOTION        EZETIMIBE (ZETIA) 10 MG TABLET    Take 1 tablet (10 mg total) by mouth once daily.    FLUTICASONE PROPIONATE (FLONASE) 50 MCG/ACTUATION NASAL SPRAY    1 spray (50 mcg total) by Each Nostril route once daily.    KETOCONAZOLE (NIZORAL) 2 % CREAM    Apply topically 2 (two) times daily. Prn facial rash or dryness    LOSARTAN  "(COZAAR) 100 MG TABLET    Take 1 tablet (100 mg total) by mouth once daily.    METFORMIN (GLUCOPHAGE-XR) 500 MG ER 24HR TABLET    Take 2 tablets (1,000 mg total) by mouth daily with dinner or evening meal.    NITROGLYCERIN (NITROSTAT) 0.4 MG SL TABLET    Place 1 tablet (0.4 mg total) under the tongue every 5 (five) minutes as needed for Chest pain.    POTASSIUM CHLORIDE (K-TAB) 20 MEQ    Take 1 tablet (20 mEq total) by mouth once daily.    SEMAGLUTIDE, WEIGHT LOSS, (WEGOVY) 0.25 MG/0.5 ML PNIJ    Inject 0.25 mg into the skin every 7 days.    TRIAMCINOLONE ACETONIDE TOP           Allergies   Review of patient's allergies indicates:   Allergen Reactions    Crab Rash    Shrimp Rash         Review of Systems (Pertinent positives)  Review of Systems   Constitutional:  Negative for unexpected weight change.   Eyes:  Negative for visual disturbance.   Respiratory:  Negative for chest tightness and shortness of breath.    Cardiovascular:  Negative for chest pain, palpitations and leg swelling.   Gastrointestinal:  Negative for diarrhea (Improved with cutting back to 1 metformin nightly) and vomiting.   Neurological:  Negative for dizziness, light-headedness and headaches.       /88 (BP Location: Left arm, Patient Position: Sitting)   Pulse 86   Resp 18   Ht 5' 10" (1.778 m)   Wt 102.2 kg (225 lb 5 oz)   SpO2 98%   BMI 32.33 kg/m²     Physical Exam  Vitals reviewed.   Constitutional:       General: She is not in acute distress.     Appearance: Normal appearance. She is well-developed. She is obese.   HENT:      Head: Normocephalic and atraumatic.     Eyes:      Conjunctiva/sclera: Conjunctivae normal.   Cardiovascular:      Rate and Rhythm: Normal rate and regular rhythm.   Pulmonary:      Effort: Pulmonary effort is normal.      Breath sounds: Normal breath sounds.   Musculoskeletal:      Right lower leg: No edema.      Left lower leg: No edema.   Neurological:      General: No focal deficit present.      Mental " Status: She is alert and oriented to person, place, and time.   Psychiatric:         Mood and Affect: Mood normal.         Behavior: Behavior normal.           Assessment/Plan:  Lizet Andrea is a 53 y.o. female who presents today for :        ICD-10-CM ICD-9-CM    1. Prediabetes  R73.03 790.29 Hemoglobin A1C      Comprehensive Metabolic Panel      Lipid Panel      Vitamin D      2. Obesity, unspecified classification, unspecified obesity type, unspecified whether serious comorbidity present  E66.9 278.00       3. Coronary artery disease involving native coronary artery of native heart without angina pectoris  I25.10 414.01       4. Accelerated hypertension  I10 401.0       5. History of non-ST elevation myocardial infarction (NSTEMI)  I25.2 412 Hemoglobin A1C      Comprehensive Metabolic Panel      Lipid Panel      Vitamin D      6. Essential hypertension  I10 401.9       7. Diuretic-induced hypokalemia  E87.6 276.8     T50.2X5A E944.4       8. Long-term use of aspirin therapy  Z79.82 V58.66       9. Status post coronary artery stent placement  Z95.5 V45.82       10. Vitamin D deficiency  E55.9 268.9 Vitamin D      11. Allergic rhinitis, unspecified seasonality, unspecified trigger  J30.9 477.9       12. Need for shingles vaccine  Z23 V04.89       13. Medication management  Z79.899 V58.69 Hemoglobin A1C      Comprehensive Metabolic Panel      Lipid Panel      Vitamin D      14. Encounter for screening mammogram for breast cancer  Z12.31 V76.12 Mammo Digital Screening Bilat           NEXT COLONOSCOPY DUE JANUARY 2033, NEXT MAMMOGRAM DUE 12/20/2023--ORDERED.    SHINGRIX--1/2, FLU SHOT & FALL 2023 COVID-19 BOOSTER ADVISED       Coronary artery disease status post NSTEMI, stent placement with underlying obesity, hypertension, hyperlipidemia, prediabetes: previously put on about 10 lbs with increase in A1c to 6.3.   A1C IMPROVED TO 6.1--> 5.9 WITH STARTING METFORMIN. Better tolerating 500 XR daily (down from  1000). Would like to try weekly GLP-1 instead, and Wegovy sent to try if it's affordable-- prior issues with prescription, re sent to [facility], unable to send to [facility] pharmacy as she has .  Now has a 2nd insurance and would like to see if the Wegovy is better covered.  Following with Cardiology Dr Cox-- has normal stress test last year (2/11/22) and no recent NTG use. LDL now at goal < 70 on Lipitor 80 with Zetia.         Continue high dose STATIN with Lipitor 80 + Zetia 10 w/  LDL goal < 70, ASA 81 mg daily for secondary prevention.   BP controlled on Cozaar 100/ Amlodipine 10./ COREG 25 BID, CHLORTHALIDONE 25 (+ K= supplement)    Resume vitamin-D supplementation, recheck with next blood draw. Also take potassium more consistently.  Will recheck with next blood draw.       There are no Patient Instructions on file for this visit.      Follow up in about 5 months (around 1/25/2024) for to follow up on lab results, return as needed for new concerns.

## 2023-09-08 DIAGNOSIS — E66.09 CLASS 1 OBESITY DUE TO EXCESS CALORIES WITH SERIOUS COMORBIDITY AND BODY MASS INDEX (BMI) OF 32.0 TO 32.9 IN ADULT: ICD-10-CM

## 2023-09-08 NOTE — TELEPHONE ENCOUNTER
----- Message from Ariadna Jones sent at 9/8/2023  2:19 PM CDT -----  Type:  Needs Medical Advice    Who Called:  Pt  Symptoms (please be specific):  Hoarse throat   How long has patient had these symptoms:   @ weeks  Pharmacy name and phone #:   [id number] [address] [facility] [address]   Phone:  [phone]  Would the patient rather a call back or a response via [facility]?  call  Best Call Back Number:  [phone]  Additional Information:  Pt states that she forgot to mention that she has a hoarse voice when she was at her appt with  last week.  Pt is requesting to be prescribed medication for her symptoms.

## 2023-09-08 NOTE — TELEPHONE ENCOUNTER
No care due was identified.  Health [facility] Embedded Care Due Messages. Reference number: [id number].   9/08/2023 4:50:28 PM CDT

## 2023-09-08 NOTE — TELEPHONE ENCOUNTER
Pt stated she is having sinus issues and she is experiencing hoarseness and was wondering if she can get something perscribed to help with it. She is currently drinking hot teas to try and it doesn't seem to be getting better.    Pt also stated she has another insurance so she wants to try the mourjano again through the new insurance

## 2023-09-08 NOTE — TELEPHONE ENCOUNTER
----- Message from Ariadna Jones sent at 9/8/2023  2:24 PM CDT -----  Type:  RX Refill Request    Who Called:  Pt  Refill or New Rx: Refill  RX Name and Strength: semaglutide, weight loss, (WEGOVY) 0.25 mg/0.5 mL Samantha [[id number]]  How is the patient currently taking it? (ex. 1XDay): Inject 0.25 mg into the skin every 7 days. - Subcutaneous    Preferred Pharmacy with phone number: [id number] [address] [facility] [address]   Phone:  [phone]  Local or Mail Order: local  Ordering Provider: Missy  Would the patient rather a call back or a response via [facility]?  call  Best Call Back Number: [phone]  Additional Information:

## 2023-09-09 RX ORDER — SEMAGLUTIDE 0.25 MG/.5ML
0.25 INJECTION, SOLUTION SUBCUTANEOUS
Qty: 2 ML | Refills: 1 | Status: SHIPPED | OUTPATIENT
Start: 2023-09-09 | End: 2023-09-14 | Stop reason: SDUPTHER

## 2023-09-09 NOTE — TELEPHONE ENCOUNTER
I have sent the Wegovy prescription to [facility]-- she should let us know if it's covered because we would want to increase the dose periodically until her maintenance dose is reached.     For her hoarseness I would advise Mucinex or Mucinex DM(if she has a cough) every 12 hours to help thin out and clear any drainage that is the likely cause of her hoarseness.     I  would also advise rinsing her nose with pre-mixed isotonic saline like simply saline to ensure that she clears out drainage in her nose that could slide down the back of her throat to irritate her vocal cords and take a 24 hour antihistamine like allegra/zyrtec/claritin.     If this is not working then I can send in a prescription steroid pack, but it is preferred that she try the above first.

## 2023-09-13 ENCOUNTER — TELEPHONE (OUTPATIENT)
Dept: FAMILY MEDICINE | Facility: CLINIC | Age: 53
End: 2023-09-13
Payer: OTHER GOVERNMENT

## 2023-09-13 DIAGNOSIS — J04.0 LARYNGITIS: Primary | ICD-10-CM

## 2023-09-13 RX ORDER — METHYLPREDNISOLONE 4 MG/1
TABLET ORAL
Qty: 21 EACH | Refills: 0 | Status: SHIPPED | OUTPATIENT
Start: 2023-09-13 | End: 2023-10-04

## 2023-09-13 NOTE — TELEPHONE ENCOUNTER
"Called pt and told her I was sorry but somehow the information became lost but Dr. Louis said:    "I have sent the Wegovy prescription to [facility]-- she should let us know if it's covered because we would want to increase the dose periodically until her maintenance dose is reached.      For her hoarseness I would advise Mucinex or Mucinex DM(if she has a cough) every 12 hours to help thin out and clear any drainage that is the likely cause of her hoarseness.      I  would also advise rinsing her nose with pre-mixed isotonic saline like simply saline to ensure that she clears out drainage in her nose that could slide down the back of her throat to irritate her vocal cords and take a 24 hour antihistamine like allegra/zyrtec/claritin.      If this is not working then I can send in a prescription steroid pack, but it is preferred that she try the above first."    Pt stated she has already tried all the items recommended and is still hoarse. Would like the steroid pack sent  "

## 2023-09-13 NOTE — TELEPHONE ENCOUNTER
----- Message from Tonia Rudd sent at 9/12/2023  4:53 PM CDT -----  Type:  Needs Medical Advice    Who Called: pt  Symptoms (please be specific): pt has been waiting since last week for a call back on her medication wanting to be called in pt has been horse since Friday if not before please call as soon as you get this   Would the patient rather a call back or a response via [facility]? call  Best Call Back Number: [phone]   Additional Information: pt also needs assistance on getting her wegovy  please verify with insurance that it is needed

## 2023-09-14 ENCOUNTER — PATIENT MESSAGE (OUTPATIENT)
Dept: FAMILY MEDICINE | Facility: CLINIC | Age: 53
End: 2023-09-14
Payer: OTHER GOVERNMENT

## 2023-09-14 DIAGNOSIS — E66.09 CLASS 1 OBESITY DUE TO EXCESS CALORIES WITH SERIOUS COMORBIDITY AND BODY MASS INDEX (BMI) OF 32.0 TO 32.9 IN ADULT: ICD-10-CM

## 2023-09-14 RX ORDER — SEMAGLUTIDE 0.25 MG/.5ML
0.25 INJECTION, SOLUTION SUBCUTANEOUS
Qty: 2 ML | Refills: 1 | Status: SHIPPED | OUTPATIENT
Start: 2023-09-14 | End: 2023-10-04 | Stop reason: SDUPTHER

## 2023-09-14 NOTE — TELEPHONE ENCOUNTER
Called pt and explained that the pharmacy does not have the rx in stock and that to do a pa would be pointless.   Pt state that she will like to see if she can get it sent to the [facility] to see if they can get it covered under her new insurance.

## 2023-09-14 NOTE — TELEPHONE ENCOUNTER
No care due was identified.  [facility] Embedded Care Due Messages. Reference number: [id number].   9/14/2023 4:08:19 PM CDT

## 2023-09-14 NOTE — TELEPHONE ENCOUNTER
Contacted pharmacy about a pa for the wegovy. They stated that for the wegovy they are on back order for the .25 and .50 and they dont know when they will get it.

## 2023-09-25 ENCOUNTER — TELEPHONE (OUTPATIENT)
Dept: FAMILY MEDICINE | Facility: CLINIC | Age: 53
End: 2023-09-25
Payer: OTHER GOVERNMENT

## 2023-09-25 NOTE — TELEPHONE ENCOUNTER
----- Message from Ernestine Cardona sent at 9/22/2023 12:35 PM CDT -----  Type:  Pharmacy prior authorization call back    Name of Caller: [facility]    Best Call Back Number:  ref key [id number]  Additional Information: caller says he is checking the status of pt's wegovy rx; wants to know if there is any assistance staff need in this from them

## 2023-10-03 ENCOUNTER — TELEPHONE (OUTPATIENT)
Dept: FAMILY MEDICINE | Facility: CLINIC | Age: 53
End: 2023-10-03
Payer: OTHER GOVERNMENT

## 2023-10-03 NOTE — TELEPHONE ENCOUNTER
----- Message from Grisel Zendejas sent at 10/3/2023  4:13 PM CDT -----  Type:  Patient Returning Call        Who Called:Pt   Would the patient rather a call back or a response via [facility]? Call back   Best Call Back Number:[phone]  Additional Information: Medication is available at [facility]   1.7 and 2.4 available     PLEASE CALL PATIENT     semaglutide, weight loss, (WEGOVY) 0.25 mg/0.5 mL Samantha      [facility] [address] [place] [address]   Phone:  [phone]  Fax:  [phone]          
Spoke to patient who called around different pharmacies and finally able to find semaglutide, weight loss, (WEGOVY) 0.25 mg/0.5 mL PnIj   1.7 and 2.4 available      At the     [facility] [address] [place] [address]   Phone: [phone]   Fax: [phone]   
normal S1, S2 heard

## 2023-10-04 ENCOUNTER — TELEPHONE (OUTPATIENT)
Dept: FAMILY MEDICINE | Facility: CLINIC | Age: 53
End: 2023-10-04
Payer: OTHER GOVERNMENT

## 2023-10-04 DIAGNOSIS — E66.09 CLASS 1 OBESITY DUE TO EXCESS CALORIES WITH SERIOUS COMORBIDITY AND BODY MASS INDEX (BMI) OF 32.0 TO 32.9 IN ADULT: ICD-10-CM

## 2023-10-04 RX ORDER — SEMAGLUTIDE 0.25 MG/.5ML
0.25 INJECTION, SOLUTION SUBCUTANEOUS
Qty: 6 ML | Refills: 1 | Status: SHIPPED | OUTPATIENT
Start: 2023-10-04 | End: 2023-12-24 | Stop reason: SDUPTHER

## 2023-10-04 NOTE — TELEPHONE ENCOUNTER
----- Message from Elizabeth Rodriguez MA sent at 10/3/2023  5:26 PM CDT -----  Spoke to patient who called around different pharmacies and finally able to find semaglutide, weight loss, (WEGOVY) 0.25 mg/0.5 mL PnIj   1.7 and 2.4 available      At the     [facility] [address] [place] [address]   Phone: [phone]   Fax: [phone]     Pharmacy is recommending a 90 day supply due to patient not having rx for a while

## 2023-10-09 ENCOUNTER — PATIENT MESSAGE (OUTPATIENT)
Dept: FAMILY MEDICINE | Facility: CLINIC | Age: 53
End: 2023-10-09
Payer: OTHER GOVERNMENT

## 2023-10-09 ENCOUNTER — PATIENT MESSAGE (OUTPATIENT)
Dept: ADMINISTRATIVE | Facility: HOSPITAL | Age: 53
End: 2023-10-09
Payer: OTHER GOVERNMENT

## 2023-10-11 ENCOUNTER — OFFICE VISIT (OUTPATIENT)
Dept: CARDIOLOGY | Facility: CLINIC | Age: 53
End: 2023-10-11
Payer: OTHER GOVERNMENT

## 2023-10-11 VITALS
HEIGHT: 70 IN | BODY MASS INDEX: 31.88 KG/M2 | DIASTOLIC BLOOD PRESSURE: 86 MMHG | HEART RATE: 69 BPM | SYSTOLIC BLOOD PRESSURE: 119 MMHG | OXYGEN SATURATION: 98 % | WEIGHT: 222.69 LBS

## 2023-10-11 DIAGNOSIS — I25.2 HISTORY OF NON-ST ELEVATION MYOCARDIAL INFARCTION (NSTEMI): ICD-10-CM

## 2023-10-11 DIAGNOSIS — I10 ESSENTIAL HYPERTENSION: ICD-10-CM

## 2023-10-11 DIAGNOSIS — I25.10 CORONARY ARTERY DISEASE INVOLVING NATIVE CORONARY ARTERY OF NATIVE HEART WITHOUT ANGINA PECTORIS: ICD-10-CM

## 2023-10-11 DIAGNOSIS — Z95.5 STATUS POST CORONARY ARTERY STENT PLACEMENT: Primary | ICD-10-CM

## 2023-10-11 DIAGNOSIS — R73.03 PREDIABETES: ICD-10-CM

## 2023-10-11 DIAGNOSIS — I10 ACCELERATED HYPERTENSION: ICD-10-CM

## 2023-10-11 PROCEDURE — 99999 PR PBB SHADOW E&M-EST. PATIENT-LVL III: CPT | Mod: PBBFAC,,, | Performed by: INTERNAL MEDICINE

## 2023-10-11 PROCEDURE — 99213 OFFICE O/P EST LOW 20 MIN: CPT | Mod: PBBFAC,PO | Performed by: INTERNAL MEDICINE

## 2023-10-11 PROCEDURE — 99214 OFFICE O/P EST MOD 30 MIN: CPT | Mod: S$PBB,,, | Performed by: INTERNAL MEDICINE

## 2023-10-11 PROCEDURE — 99214 PR OFFICE/OUTPT VISIT, EST, LEVL IV, 30-39 MIN: ICD-10-PCS | Mod: S$PBB,,, | Performed by: INTERNAL MEDICINE

## 2023-10-11 PROCEDURE — 99999 PR PBB SHADOW E&M-EST. PATIENT-LVL III: ICD-10-PCS | Mod: PBBFAC,,, | Performed by: INTERNAL MEDICINE

## 2023-10-11 NOTE — PROGRESS NOTES
Subjective:   @Patient ID:  Lizet Andrea is a 53 y.o. female who presents for follow-up of CAD      HPI:   10/2023: f/u. She is doing well. No chest pain. No significant SANTO. She is compliant with her medication. LDL at goal. BP well controlled. Working on wt loss      1/2023: Patient is here for follow up. She is doing well today.  No recurrent chest pains.  She did not have to take any nitroglycerin. Blood pressure is well controlled. She does have some vague right upper quadrant pain completely different than her prior cardiac chest pain      She is compliant with her medication    Stress MPI earlier this year was negative for ischemia        Prior cardiovascular  Hx  --------------------------------  Stress MPI 2/11/2022  1. Scintigraphically negative for ischemia or infarct.  2. The global left ventricular systolic function is normal with an LV ejection fraction of greater than 75 % and no evidence of LV dilatation. Wall motion is normal.    CAD s/p PCI LCX to NSTEMI in 2015 with 3.5 x 15 resolute ALDA, [facility] in 2016 showed patent stent    EKG December 15, 2022 sinus rhythm diffuse nonspecific T-wave abnormalities no acute changes as compared to prior.  - ECHO 1/2015 EF 60-65%    Patient Active Problem List    Diagnosis Date Noted    Lateral pain of hip 01/25/2022    Decreased strength of lower extremity 01/25/2022    Acute left-sided low back pain with left-sided sciatica 01/24/2022    Ulnar nerve palsy of right upper extremity 09/22/2020    Vitamin D deficiency 03/02/2020    Essential hypertension 10/16/2019    Cubital tunnel syndrome on left 01/14/2019    Prediabetes 03/14/2018    Long-term use of aspirin therapy 10/25/2017    Diuretic-induced hypokalemia 07/02/2016    History of non-ST elevation myocardial infarction (NSTEMI) 07/02/2016    Coronary artery disease involving native coronary artery of native heart without angina pectoris 07/02/2016    Hepatitis C antibody test positive 05/04/2016      Negative HCV RNA 05/2016, will check again in 3 months but no detectable HCV virus  HCV AB will likely remain positive for life  HCV RNA again undetectable 10/2/17      Plantar fasciitis 10/22/2015     stretching exercises demonstrated, recommended foot wear fitting-- good arch support      Status post coronary artery stent placement 01/19/2015     LCx stent 1/49084      Allergic rhinitis 01/03/2015    Accelerated hypertension 01/01/2015    Uterine fibroid 09/09/2013                    LAST HbA1c  Lab Results   Component Value Date    HGBA1C 5.9 (H) 08/24/2023       Lipid panel  Lab Results   Component Value Date    CHOL 128 08/24/2023    CHOL 153 04/18/2023    CHOL 144 12/15/2022     Lab Results   Component Value Date    HDL 48 08/24/2023    HDL 44 04/18/2023    HDL 46 12/15/2022     Lab Results   Component Value Date    LDLCALC 68.2 08/24/2023    LDLCALC 99.4 04/18/2023    LDLCALC 90.6 12/15/2022     Lab Results   Component Value Date    TRIG 59 08/24/2023    TRIG 48 04/18/2023    TRIG 37 12/15/2022     Lab Results   Component Value Date    CHOLHDL 37.5 08/24/2023    CHOLHDL 28.8 04/18/2023    CHOLHDL 31.9 12/15/2022            Review of Systems   Constitutional: Negative for chills and fever.   HENT:  Negative for hearing loss and nosebleeds.    Eyes:  Negative for blurred vision.   Cardiovascular:  Negative for chest pain and palpitations.   Respiratory:  Negative for cough, hemoptysis and shortness of breath.    Hematologic/Lymphatic: Negative for bleeding problem.   Skin:  Negative for itching.   Musculoskeletal:  Negative for falls.   Gastrointestinal:  Negative for abdominal pain and hematochezia.   Genitourinary:  Negative for hematuria.   Neurological:  Negative for dizziness and loss of balance.   Psychiatric/Behavioral:  Negative for altered mental status and depression.        Objective:   Physical Exam  Constitutional:       Appearance: She is well-developed.   HENT:      Head: Normocephalic and  atraumatic.   Eyes:      Conjunctiva/sclera: Conjunctivae normal.   Neck:      Vascular: No carotid bruit.   Cardiovascular:      Rate and Rhythm: Normal rate and regular rhythm.      Heart sounds: Normal heart sounds. No murmur heard.     No friction rub. No gallop.   Pulmonary:      Effort: Pulmonary effort is normal. No respiratory distress.      Breath sounds: Normal breath sounds. No stridor. No wheezing.   Musculoskeletal:      Cervical back: Neck supple.   Skin:     General: Skin is warm and dry.   Neurological:      Mental Status: She is alert and oriented to person, place, and time.   Psychiatric:         Behavior: Behavior normal.         Assessment:     1. Status post coronary artery stent placement    2. Accelerated hypertension    3. History of non-ST elevation myocardial infarction (NSTEMI)    4. Coronary artery disease involving native coronary artery of native heart without angina pectoris    5. Essential hypertension    6. Prediabetes          Plan:   - Continue OMT with ASA and Statin  - stable coronary artery disease  -  stress MPI this year with no ischemia.  -  nitroglycerin p.r.n..    - Target LDL < 70 ,  Continue Zetial and Lipitor 80 mg qhs. LDL at goal.     - WT loss and life style modification discussed with the patient.        I spent 5-10 minutes asking, assessing, assisting, arranging and advising heart healthy diet improvements. This included low-salt meals, portion control and health food alternatives. I also encourage 30 minutes of moderate exercise 3-4x a week.     1 year f/u     EKG reviewed no acute ST changes.    Continue with current medical plan and lifestyle changes.  Return sooner for concerns or questions. If symptoms persist go to the ED  I have reviewed all pertinent data including patient's medical history in detail and updated the computerized patient record.     No orders of the defined types were placed in this encounter.      Follow up as scheduled.     She expressed  verbal understanding and agreed with the plan    Patient's Medications   New Prescriptions    No medications on file   Previous Medications    AMLODIPINE (NORVASC) 10 MG TABLET    Take 1 tablet (10 mg total) by mouth once daily.    ASPIRIN 81 MG CHEW    Take 81 mg by mouth once daily.    ATORVASTATIN (LIPITOR) 80 MG TABLET    Take 1 tablet (80 mg total) by mouth once daily.    BENZONATATE (TESSALON) 100 MG CAPSULE    Take 200 mg by mouth every 8 (eight) hours as needed.    CARVEDILOL (COREG) 25 MG TABLET    TAKE 1 TABLET BY MOUTH TWICE DAILY WITH MEAL    CETIRIZINE (ZYRTEC) 10 MG TABLET    Take 1 tablet (10 mg total) by mouth once daily.    CHLORTHALIDONE (HYGROTEN) 25 MG TAB    Take 1 tablet (25 mg total) by mouth once daily.    CHOLECALCIFEROL, VITAMIN D3, 125 MCG (5,000 UNIT) CAPSULE    Take 1 capsule (5,000 Units total) by mouth daily with breakfast.    CLINDAMYCIN (CLEOCIN T) 1 % LOTION        EZETIMIBE (ZETIA) 10 MG TABLET    Take 1 tablet (10 mg total) by mouth once daily.    FLUTICASONE PROPIONATE (FLONASE) 50 MCG/ACTUATION NASAL SPRAY    1 spray (50 mcg total) by Each Nostril route once daily.    KETOCONAZOLE (NIZORAL) 2 % CREAM    Apply topically 2 (two) times daily. Prn facial rash or dryness    LOSARTAN (COZAAR) 100 MG TABLET    Take 1 tablet (100 mg total) by mouth once daily.    METFORMIN (GLUCOPHAGE-XR) 500 MG ER 24HR TABLET    Take 2 tablets (1,000 mg total) by mouth daily with dinner or evening meal.    NITROGLYCERIN (NITROSTAT) 0.4 MG SL TABLET    Place 1 tablet (0.4 mg total) under the tongue every 5 (five) minutes as needed for Chest pain.    POTASSIUM CHLORIDE (K-TAB) 20 MEQ    Take 1 tablet (20 mEq total) by mouth once daily.    SEMAGLUTIDE, WEIGHT LOSS, (WEGOVY) 0.25 MG/0.5 ML PNIJ    Inject 0.25 mg into the skin every 7 days.    TRIAMCINOLONE ACETONIDE TOP       Modified Medications    No medications on file   Discontinued Medications    No medications on file

## 2023-10-12 ENCOUNTER — PATIENT OUTREACH (OUTPATIENT)
Dept: ADMINISTRATIVE | Facility: HOSPITAL | Age: 53
End: 2023-10-12
Payer: OTHER GOVERNMENT

## 2023-10-12 NOTE — PROGRESS NOTES
Care Everywhere updates requested and reviewed.  Immunizations reconciled. Media reports reviewed.  Duplicate HM overrides and  orders removed.  Overdue HM topic chart audit and/or requested.     Mammogram scheduled    Health Maintenance Due   Topic Date Due    Influenza Vaccine (1) 2023    COVID-19 Vaccine (2023-24 season) 2023    Mammogram  2023

## 2023-11-08 DIAGNOSIS — M25.561 PAIN IN BOTH KNEES, UNSPECIFIED CHRONICITY: Primary | ICD-10-CM

## 2023-11-08 DIAGNOSIS — M25.562 PAIN IN BOTH KNEES, UNSPECIFIED CHRONICITY: Primary | ICD-10-CM

## 2023-11-14 ENCOUNTER — TELEPHONE (OUTPATIENT)
Dept: SPORTS MEDICINE | Facility: CLINIC | Age: 53
End: 2023-11-14
Payer: OTHER GOVERNMENT

## 2023-11-14 NOTE — TELEPHONE ENCOUNTER
Spoke to the patient in regards to their appointment on 11/16. While on the call I informed the patient that Jen Suárez PA-C  order x-ray of the knees, which is scheduled at 8:15 am on 11/16 at the [facility].

## 2023-11-16 ENCOUNTER — OFFICE VISIT (OUTPATIENT)
Dept: SPORTS MEDICINE | Facility: CLINIC | Age: 53
End: 2023-11-16
Payer: OTHER GOVERNMENT

## 2023-11-16 ENCOUNTER — HOSPITAL ENCOUNTER (OUTPATIENT)
Dept: RADIOLOGY | Facility: HOSPITAL | Age: 53
Discharge: HOME | End: 2023-11-16
Attending: PHYSICIAN ASSISTANT
Payer: OTHER GOVERNMENT

## 2023-11-16 VITALS
WEIGHT: 231.94 LBS | BODY MASS INDEX: 33.21 KG/M2 | DIASTOLIC BLOOD PRESSURE: 92 MMHG | HEART RATE: 76 BPM | HEIGHT: 70 IN | SYSTOLIC BLOOD PRESSURE: 140 MMHG

## 2023-11-16 DIAGNOSIS — M17.11 PRIMARY OSTEOARTHRITIS OF RIGHT KNEE: ICD-10-CM

## 2023-11-16 DIAGNOSIS — M25.561 PAIN IN BOTH KNEES, UNSPECIFIED CHRONICITY: ICD-10-CM

## 2023-11-16 DIAGNOSIS — M25.562 PAIN IN BOTH KNEES, UNSPECIFIED CHRONICITY: ICD-10-CM

## 2023-11-16 DIAGNOSIS — M17.12 PRIMARY OSTEOARTHRITIS OF LEFT KNEE: Primary | ICD-10-CM

## 2023-11-16 PROCEDURE — 99215 OFFICE O/P EST HI 40 MIN: CPT | Mod: PBBFAC,PN | Performed by: PHYSICIAN ASSISTANT

## 2023-11-16 PROCEDURE — 99999 PR PBB SHADOW E&M-EST. PATIENT-LVL V: CPT | Mod: PBBFAC,,, | Performed by: PHYSICIAN ASSISTANT

## 2023-11-16 PROCEDURE — 73564 XR KNEE ORTHO BILAT WITH FLEXION: ICD-10-PCS | Mod: 26,50,, | Performed by: RADIOLOGY

## 2023-11-16 PROCEDURE — 73564 X-RAY EXAM KNEE 4 OR MORE: CPT | Mod: TC,50,PN

## 2023-11-16 PROCEDURE — 99203 PR OFFICE/OUTPT VISIT, NEW, LEVL III, 30-44 MIN: ICD-10-PCS | Mod: S$PBB,,, | Performed by: PHYSICIAN ASSISTANT

## 2023-11-16 PROCEDURE — 99999 PR PBB SHADOW E&M-EST. PATIENT-LVL V: ICD-10-PCS | Mod: PBBFAC,,, | Performed by: PHYSICIAN ASSISTANT

## 2023-11-16 PROCEDURE — 99203 OFFICE O/P NEW LOW 30 MIN: CPT | Mod: S$PBB,,, | Performed by: PHYSICIAN ASSISTANT

## 2023-11-16 PROCEDURE — 73564 X-RAY EXAM KNEE 4 OR MORE: CPT | Mod: 26,50,, | Performed by: RADIOLOGY

## 2023-11-16 NOTE — PATIENT INSTRUCTIONS
DESCRIPTION  Synvisc-One (hylan G-F 20) is an elastoviscous high molecular weight fluid containing hylan A and hylan B polymers produced from chicken graham. Hylans are derivatives of hyaluronan (sodium hyaluronate). Hylan G-F 20 is unique in that the hyaluronan is chemically cross linked. Hyaluronan is a long-chain polymer containing repeating disaccharide units of Lc-thjxcymctga-U-acetylglucosamine.     INDICATIONS FOR USE  Synvisc-One is indicated for the treatment of pain in osteoarthritis (OA) of the knee in patients who have failed to respond adequately to conservative nonpharmacologic therapy and simple analgesics, e.g., acetaminophen.     Who is a candidate for Synvisc-One  Patients with knee osteoarthritis, who have tried diet, exercise and over-the-counter pain medication but still have pain, should talk to their doctor to see if Synvisc-One is right for them.     How Synvisc-One is administered  Synvisc-One is a single injection. It's a simple, in-office procedure that only takes a few minutes.     What you can expect following a Synvisc-One knee injection Synvisc-One can provide up to six months of osteoarthritis knee pain relief. Everyone responds differently, but in a medical study* patients experienced relief starting one month after the injection.     After the injection, you can resume normal day-to-day activities, but you should avoid any strenuous activities for about 48 hours.     Contraindication  Do not administer to patients with known hypersensitivity (allergy) to hyaluronan (sodium hyaluronate) preparations.   Do not inject Synvisc-One in the knees of patients having knee joint infections or skin diseases or infections in the area of theinjection site.    What are possible side effects?  Synvisc may occur short-term pain, swelling at the injection site and / or the appearance of synovial exudate after injection. In some cases, exudation may be significant and cause more prolonged pain.      Important Safety Information  Before trying Synvisc-One or SYNVISC, tell your doctor if you are allergic to products from birds - such as feathers, eggs or poultry - or if your leg is swollen or infected. Synvisc-One and SYNVISC are only for injection into the knee, performed by a doctor or other qualified health care professional. Synvisc-One and SYNVISC have not been tested to show pain relief in joints other than the knee. Talk to your doctor before resuming strenuous weight-bearing activities after treatment. Synvisc-One and SYNVISC have not been tested in children, pregnant women or women who are nursing. You should tell your doctor if you think you are pregnant or if you are nursing a child. The side effects most commonly seen when Synvisc-One or SYNVISC is injected into the knee were pain, swelling and/or fluid buildup in or around the knee. Cases where the swelling is extensive or painful should be discussed with your doctor. Allergic reactions such as rash and hives have been reported rarely.         WHAT IS EUFLEXXA®?  EUFLEXXA® is a gel-like, elastic, sterile product containing natural, highly purified hyaluronan (kgz-h-ifo-CAR-nan), (also known as sodium hyaluronate). Hyaluronanis a natural substance found in the body and is present in particularly high amounts in joint tissues and in the fluid that fills the joints. The bodys own hyaluronan acts like a lubricant and a shock absorber in the joint, and is needed for the joint to function properly. Osteoarthritis is a condition that involves the wearing down of cartilage (the protective covering on the ends of your bones). In osteoarthritis, there may not be enough hyaluronan, and there may be a decrease in the quality of the hyaluronan in joint fluid and tissues.  EUFLEXXA® comes in pre-filled syringes containing 2 ml (about half a teaspoon) of product. EUFLEXXA® is given by injection directly into the knee joint.    WHAT IS EUFLEXXA® USED  FOR?  EUFLEXXA® is used to relieve knee pain due to osteoarthritis. It is used for patients who do not get enough relief from simple pain medications such as acetaminophen or from exercise and physical therapy.    WHAT ARE THE BENEFITS OF EUFLEXXA®?  A clinical study involving 321 patients between the ages of 50 and 80years of age with knee pain due to osteoarthritis was performed in [facility]. The study investigated the safety and effectiveness of EUFLEXXA®. The patients were placed in one of two groups. One group was given an injection of EUFLEXXA® into one or both knee joints once a week for 3 weeks. The second group was given an injection of another commercially available hyaluronan once a week for 3 weeks. Pain, stiffness and function of the knee joint and patients and doctors judgment of the success of treatment were measured for 12 weeks. Patients with osteoarthritic knee joint pain, who had not obtained painrelief with other medications, experienced pain relief from the EUFLEXXA® injections into the knee joint, similar to those patients who received injections of the other hyaluronan.

## 2023-11-16 NOTE — PROGRESS NOTES
Subjective:     Chief Complaint: Lizet Andrea is a 53 y.o. female who had concerns including Pain of the Right Knee and Pain of the Left Knee.    Patient who has a sedentary desk job presents to clinic with intermittent bilateral knee pain x 2 months. Denies a specific LUPE. Pain increases with climbing stairs. Denies any instability or mechanical symptoms. Pain today is 0/10. Pain at its worst is 4-5/10. She has not been taking any medications for pain. Pain is located lateral loly-patellar. She is here today to discuss treatment options.        Review of Systems   Constitutional: Negative. Negative for chills, fever, weight gain and weight loss.   HENT:  Negative for congestion and sore throat.    Eyes:  Negative for blurred vision and double vision.   Cardiovascular:  Negative for chest pain, leg swelling and palpitations.   Respiratory:  Negative for cough and shortness of breath.    Hematologic/Lymphatic: Does not bruise/bleed easily.   Skin:  Negative for itching, poor wound healing and rash.   Musculoskeletal:  Positive for joint pain and joint swelling. Negative for back pain, muscle weakness, myalgias and stiffness.   Gastrointestinal:  Negative for abdominal pain, constipation, diarrhea, nausea and vomiting.   Genitourinary: Negative.  Negative for frequency and hematuria.   Neurological:  Negative for dizziness, headaches, numbness, paresthesias and sensory change.   Psychiatric/Behavioral:  Negative for altered mental status and depression. The patient is not nervous/anxious.    Allergic/Immunologic: Negative for hives.                 Objective:     General: Lizet is well-developed, well-nourished, appears stated age, in no acute distress, alert and oriented to time, place and person.     General    Vitals reviewed.  Constitutional: She is oriented to person, place, and time. She appears well-developed and well-nourished. No distress.   HENT:   Head: Normocephalic and atraumatic.   Eyes: EOM are  normal.   Cardiovascular:  Normal rate and regular rhythm.            Pulmonary/Chest: Effort normal. No respiratory distress.   Neurological: She is alert and oriented to person, place, and time. She has normal reflexes. No cranial nerve deficit. Coordination normal.   Psychiatric: She has a normal mood and affect. Her behavior is normal. Judgment and thought content normal.     General Musculoskeletal Exam   Gait: normal       Right Knee Exam     Inspection   Erythema: absent  Scars: absent  Swelling: absent  Effusion: absent  Deformity: absent  Bruising: absent    Tenderness   The patient is experiencing no tenderness.     Range of Motion   Extension:  0 normal   Flexion:  130 normal     Tests   Meniscus   Aayush:  Medial - negative Lateral - negative  Ligament Examination   Lachman: normal (-1 to 2mm)   PCL-Posterior Drawer: normal (0 to 2mm)     MCL - Valgus: normal (0 to 2mm)  LCL - Varus: normal  Pivot Shift: normal (Equal)  Reverse Pivot Shift: normal (Equal)  Posterolateral Corner: stable  Patella   Passive Patellar Tilt: neutral  Patellar Glide (quadrants): Lateral - 1   Medial - 2  Patellar Grind: negative    Other   Sensation: normal    Left Knee Exam     Inspection   Erythema: absent  Scars: absent  Swelling: absent  Effusion: absent  Deformity: absent  Bruising: absent    Tenderness   The patient is experiencing no tenderness.     Range of Motion   Extension:  0 normal   Flexion:  130 normal     Tests   Meniscus   Aayush:  Medial - negative Lateral - negative  Stability   Lachman: normal (-1 to 2mm)   PCL-Posterior Drawer: normal (0 to 2mm)  MCL - Valgus: normal (0 to 2mm)  LCL - Varus: normal (0 to 2mm)  Pivot Shift: normal (Equal)  Reverse Pivot Shift: normal (Equal)  Posterolateral Corner: stable  Patella   Passive Patellar Tilt: neutral  Patellar Glide (Quadrants): Lateral - 1 Medial - 2  Patellar Grind: positive    Other   Sensation: normal    Muscle Strength   Right Lower Extremity   Hip  Abduction: 5/5   Quadriceps:  5/5   Hamstrin/5   Left Lower Extremity   Hip Abduction: 5/5   Quadriceps:  5/5   Hamstrin/5     Reflexes     Left Side  Achilles:  2+  Quadriceps:  2+    Right Side   Achilles:  2+  Quadriceps:  2+    Vascular Exam     Right Pulses  Dorsalis Pedis:      2+  Posterior Tibial:      2+        Left Pulses  Dorsalis Pedis:      2+  Posterior Tibial:      2+        RADIOGRAPHS: 23  Bilateral knees:  FINDINGS:  The skeletal structures at the knees are intact.  Alignment shows mild lateral subluxation of both patellae.  Bilateral medial tibiofemoral and the lateral patellofemoral joint spaces are minimally narrowed consistent with degenerative joint disease.  Knee joint spaces are otherwise satisfactory for age.  No erosion or joint effusion is detected.      Assessment:     Encounter Diagnoses   Name Primary?    Primary osteoarthritis of left knee Yes    Primary osteoarthritis of right knee         Plan:     We have discussed a variety of treatment options including medications, injections, physical therapy and other alternative treatments. I also explained the indications, risks and benefits of surgery. Given the patients hx and examination today, I believe she would benefit from visco supplementation and weight loss. Pt agrees and would like to proceed with weight loss. She would like to hold off on visco supplementation at this time.    I made the decision to obtain old records of the patient including previous notes and imaging. I independently reviewed and interpreted lab results today as well as prior imaging.  Reviewed with patient. Most significant PF arthritis.    1. OTC NSAIDs as needed.  2. Information on Glucosamine and Euflexxa/Synvisc one provided to patient today.  3. Ice compress to the affected area 2-3x a day for 15-20 minutes as needed for pain management.  4. Patient understands that increased BMI can contribute to chronic knee pain. Currently Body mass  index is 33.28 kg/m².   5.  RTC to see Jen Suárez PA-C in 3 months for follow up and weight check.       All of the patient's questions were answered and the patient will contact us if they have any questions or concerns in the interim.         Patient questionnaires may have been collected.

## 2023-11-16 NOTE — LETTER
Patient: Lizet Andrea   YOB: 1970   Clinic Number: [id number]   Today's Date: November 16, 2023        Certificate to Return to Work     Lizet Walker was seen by Jen Suárez PA-C on 11/16/2023.        Lizet Walker can return to work on 11/162023.      If you have any questions or concerns, please feel free to contact the office at [phone].    Thank you.    Jen Suárez PA-C        Signature:

## 2023-12-19 ENCOUNTER — TELEPHONE (OUTPATIENT)
Dept: FAMILY MEDICINE | Facility: CLINIC | Age: 53
End: 2023-12-19
Payer: OTHER GOVERNMENT

## 2023-12-19 DIAGNOSIS — E66.09 CLASS 1 OBESITY DUE TO EXCESS CALORIES WITH SERIOUS COMORBIDITY AND BODY MASS INDEX (BMI) OF 32.0 TO 32.9 IN ADULT: ICD-10-CM

## 2023-12-19 NOTE — TELEPHONE ENCOUNTER
Pt was wondering what you think would be best. Pt had called her insurance to see if she could take the mounjaro for prediabetes weight loss. They told her that she could re do the prior authorization for the wegovy or she can speak to her doctor about taking a pill to help with prediabetes and weight loss. When I asked pt what the name of the pills were she could not tell me because she did not write them down when she was on the phone with insurance.

## 2023-12-19 NOTE — TELEPHONE ENCOUNTER
----- Message from Tonia Rudd sent at 12/18/2023  4:21 PM CST -----  Type:  Needs Medical Advice    Who Called: pt  Symptoms (please be specific): acquiring about new medication Mounjaro Pre Diabetic medication do you think her medication would take it please call back to discuss    Would the patient rather a call back or a response via [facility]? call  Best Call Back Number: [phone] (  Additional Information:

## 2023-12-24 RX ORDER — SEMAGLUTIDE 0.25 MG/.5ML
0.25 INJECTION, SOLUTION SUBCUTANEOUS
Qty: 2 ML | Refills: 1 | Status: SHIPPED | OUTPATIENT
Start: 2023-12-24 | End: 2024-03-14 | Stop reason: SDUPTHER

## 2023-12-24 NOTE — TELEPHONE ENCOUNTER
She is already on a Metformin pill for help with prediabetes and weight management. I have re-sent the Wegovy prescription to the [facility] to see if we can get that covered. If she does start on it then she needs to message me after a few weekly injections to let me know how she is doing on it, because we would need to increase the dose to a higher strength after a few weeks of the lowest dose if she is not having any negative side effects,

## 2023-12-26 NOTE — TELEPHONE ENCOUNTER
I would advise that she discuss this concern with the pharmacist and see if they can help her-- maybe they can advise a different [facility]? I am only able to communicate directly with the [facility] about stock issues such as this, so I can't really help with this supply concern unfortunately.     Other option would be to pay monthly at a [facility] to acquire a compounded version-- [facility], [facility] etc but that is about $250 per month, though does guarantee consistent med availability.

## 2023-12-26 NOTE — TELEPHONE ENCOUNTER
Pt stated that she has received a message that the wegovy is currently out of stock pt stated that her insurance does not cover [facility]. Pt was wondering if you had a recommendation of where pt can try and get the wegovy since [facility] is out

## 2023-12-26 NOTE — TELEPHONE ENCOUNTER
Called pt and told her that Dr. Louis said:  I would advise that she discuss this concern with the pharmacist and see if they can help her-- maybe they can advise a different [facility]? I am only able to communicate directly with the [facility] pharmacy about stock issues such as this, so I can't really help with this supply concern unfortunately.      Other option would be to pay monthly at a [facility] to acquire a compounded version-- [facility], [facility] etc but that is about $250 per month, though does guarantee consistent med availability.        Pt asked if I could send her this in a message as well

## 2023-12-27 ENCOUNTER — HOSPITAL ENCOUNTER (OUTPATIENT)
Dept: RADIOLOGY | Facility: HOSPITAL | Age: 53
Discharge: HOME | End: 2023-12-27
Attending: FAMILY MEDICINE
Payer: OTHER GOVERNMENT

## 2023-12-27 DIAGNOSIS — Z12.31 ENCOUNTER FOR SCREENING MAMMOGRAM FOR BREAST CANCER: ICD-10-CM

## 2023-12-27 PROCEDURE — 77067 SCR MAMMO BI INCL CAD: CPT | Mod: 26,,, | Performed by: RADIOLOGY

## 2023-12-27 PROCEDURE — 77063 MAMMO DIGITAL SCREENING BILAT WITH TOMO: ICD-10-PCS | Mod: 26,,, | Performed by: RADIOLOGY

## 2023-12-27 PROCEDURE — 77067 MAMMO DIGITAL SCREENING BILAT WITH TOMO: ICD-10-PCS | Mod: 26,,, | Performed by: RADIOLOGY

## 2023-12-27 PROCEDURE — 77067 SCR MAMMO BI INCL CAD: CPT | Mod: TC

## 2023-12-27 PROCEDURE — 77063 BREAST TOMOSYNTHESIS BI: CPT | Mod: 26,,, | Performed by: RADIOLOGY

## 2024-01-10 ENCOUNTER — OFFICE VISIT (OUTPATIENT)
Dept: DERMATOLOGY | Facility: CLINIC | Age: 54
End: 2024-01-10
Payer: OTHER GOVERNMENT

## 2024-01-10 DIAGNOSIS — L65.0 TELOGEN HAIR LOSS: ICD-10-CM

## 2024-01-10 DIAGNOSIS — L21.9 SEBORRHEIC DERMATITIS: Primary | ICD-10-CM

## 2024-01-10 DIAGNOSIS — Z76.89 ENCOUNTER FOR SKIN CARE: ICD-10-CM

## 2024-01-10 PROCEDURE — 99213 OFFICE O/P EST LOW 20 MIN: CPT | Mod: S$PBB,,, | Performed by: DERMATOLOGY

## 2024-01-10 PROCEDURE — 99999 PR PBB SHADOW E&M-EST. PATIENT-LVL IV: CPT | Mod: PBBFAC,,, | Performed by: DERMATOLOGY

## 2024-01-10 PROCEDURE — 99214 OFFICE O/P EST MOD 30 MIN: CPT | Mod: PBBFAC,PN | Performed by: DERMATOLOGY

## 2024-01-10 NOTE — PROGRESS NOTES
Subjective:      Patient ID:  Lizet Andrea is a 53 y.o. female who presents for   Chief Complaint   Patient presents with    Seborrheic Dermatitis     Scalp      Seborrheic Dermatitis - Initial  Affected locations: scalp  Duration: 2 years  Signs / symptoms: dryness and scaling  Severity: mild to moderate  Timing: constant      Review of Systems   Constitutional: Negative.    HENT: Negative.     Respiratory: Negative.     Musculoskeletal: Negative.    Skin:  Positive for dry skin.       Objective:   Physical Exam   Constitutional: She appears well-developed and well-nourished.   Neurological: She is alert and oriented to person, place, and time.   Psychiatric: She has a normal mood and affect.   Skin:               Diagram Legend     Erythematous scaling macule/papule c/w actinic keratosis       Vascular papule c/w angioma      Pigmented verrucoid papule/plaque c/w seborrheic keratosis      Yellow umbilicated papule c/w sebaceous hyperplasia      Irregularly shaped tan macule c/w lentigo     1-2 mm smooth white papules consistent with Milia      Movable subcutaneous cyst with punctum c/w epidermal inclusion cyst      Subcutaneous movable cyst c/w pilar cyst      Firm pink to brown papule c/w dermatofibroma      Pedunculated fleshy papule(s) c/w skin tag(s)      Evenly pigmented macule c/w junctional nevus     Mildly variegated pigmented, slightly irregular-bordered macule c/w mildly atypical nevus      Flesh colored to evenly pigmented papule c/w intradermal nevus       Pink pearly papule/plaque c/w basal cell carcinoma      Erythematous hyperkeratotic cursted plaque c/w SCC      Surgical scar with no sign of skin cancer recurrence      Open and closed comedones      Inflammatory papules and pustules      Verrucoid papule consistent consistent with wart     Erythematous eczematous patches and plaques     Dystrophic onycholytic nail with subungual debris c/w onychomycosis     Umbilicated papule     Erythematous-base heme-crusted tan verrucoid plaque consistent with inflamed seborrheic keratosis     Erythematous Silvery Scaling Plaque c/w Psoriasis     See annotation    Pictures reviewed on patient's phone.  Consistent with suspected diagnosis.    Assessment / Plan:        Seborrheic dermatitis  Seborrheic dermatitis is an ongoing (chronic) condition. It can go away and then come back. You will likely need to use shampoo, cream, or ointment with medicine once or twice a week. This can help to keep symptoms from coming back or getting worse.  Discussed with patient the etiology and pathogenesis of the disease or skin lesion(s) and possible treatments and aggravators.    Reviewed with patient different treatment options and associated risks.  Chronic nature of this condition discussed with patient.  Proper application of medications and or care for affected area(s) and condition(s) reviewed.  Patient to start 2-5% crude coal tar shampoo to be used as scalp soaks for at least 10 minutes, longer if possible, per their regular shampooing schedule.  Can also be applied as directed to other parts of the body.  Be careful around eyes.  Patient to watch for recurrence or flares or worsening and to call the clinic for a follow up appointment for such.    If persists with good tar, cons patch testing.  Discussed today with the patient the option of patch testing to alleviate the risk of allergens.  Warned of no bathing for three days.  Only sponge bathing allowed.  Watch for unlikely allergy.    Encounter for skin care  Avoid harsh chemicals, relaxers, hair coloring, and anything irritating to the scalp.  Less is more.  Less product usage for the scalp is for the best.  No hot water bathing reviewed.    Telogen hair loss    Patient and or guardian to monitor this area/lesion or these areas/lesions for changes or worsening or darkening (for moles and freckles).  Patient and or guardian to contact us if any changes are  noted for such.           Follow up if symptoms worsen or fail to improve.

## 2024-01-10 NOTE — PATIENT INSTRUCTIONS
Patient to start 2-5% crude coal tar shampoo to be used as scalp soaks for at least 10 minutes, longer if possible, per their regular shampooing schedule.  Can also be applied as directed to other parts of the body.  Be careful around eyes.     Avoid hot water

## 2024-01-20 DIAGNOSIS — R73.03 PREDIABETES: ICD-10-CM

## 2024-01-20 DIAGNOSIS — I10 ACCELERATED HYPERTENSION: ICD-10-CM

## 2024-01-20 NOTE — TELEPHONE ENCOUNTER
Care Due:                  Date            Visit Type   Department     Provider  --------------------------------------------------------------------------------                                EP -                              [facility]  Last Visit: 08-      CARE ([facility])   MAI Louis                              [facility]  Next Visit: 01-      CARE ([facility])   MAI Louis                                                            Last  Test          Frequency    Reason                     Performed    Due Date  --------------------------------------------------------------------------------    HBA1C.......  6 months...  metFORMIN, semaglutide,..  08- 02-    [facility] Embedded Care Due Messages. Reference number: [id number].   1/20/2024 2:06:26 PM CST

## 2024-01-21 RX ORDER — METFORMIN HYDROCHLORIDE 500 MG/1
TABLET, EXTENDED RELEASE ORAL
Qty: 180 TABLET | Refills: 0 | Status: SHIPPED | OUTPATIENT
Start: 2024-01-21 | End: 2024-06-12 | Stop reason: SDUPTHER

## 2024-01-22 RX ORDER — CHLORTHALIDONE 25 MG/1
25 TABLET ORAL DAILY
Qty: 90 TABLET | Refills: 4 | Status: SHIPPED | OUTPATIENT
Start: 2024-01-22

## 2024-01-22 NOTE — TELEPHONE ENCOUNTER
Refill Routing Note   Medication(s) are not appropriate for processing by [facility] for the following reason(s):        Required vitals abnormal  Required labs abnormal    [facility] action(s):  Defer  Approve        Medication Therapy Plan: FLOS      Appointments  past 12m or future 3m with PCP    Date Provider   Last Visit   8/25/2023 Marta Louis MD   Next Visit   1/29/2024 Marta Louis MD   ED visits in past 90 days: 0        Note composed:11:40 PM 01/21/2024

## 2024-01-24 ENCOUNTER — LAB VISIT (OUTPATIENT)
Dept: LAB | Facility: HOSPITAL | Age: 54
End: 2024-01-24
Attending: FAMILY MEDICINE
Payer: OTHER GOVERNMENT

## 2024-01-24 DIAGNOSIS — I25.2 HISTORY OF NON-ST ELEVATION MYOCARDIAL INFARCTION (NSTEMI): ICD-10-CM

## 2024-01-24 DIAGNOSIS — Z79.899 MEDICATION MANAGEMENT: ICD-10-CM

## 2024-01-24 DIAGNOSIS — R73.03 PREDIABETES: ICD-10-CM

## 2024-01-24 DIAGNOSIS — E55.9 VITAMIN D DEFICIENCY: ICD-10-CM

## 2024-01-24 LAB
25(OH)D3+25(OH)D2 SERPL-MCNC: 25 NG/ML (ref 30–96)
ALBUMIN SERPL BCP-MCNC: 3.5 G/DL (ref 3.5–5.2)
ALP SERPL-CCNC: 46 U/L (ref 55–135)
ALT SERPL W/O P-5'-P-CCNC: 11 U/L (ref 10–44)
ANION GAP SERPL CALC-SCNC: 9 MMOL/L (ref 8–16)
AST SERPL-CCNC: 17 U/L (ref 10–40)
BILIRUB SERPL-MCNC: 0.4 MG/DL (ref 0.1–1)
BUN SERPL-MCNC: 15 MG/DL (ref 6–20)
CALCIUM SERPL-MCNC: 9.4 MG/DL (ref 8.7–10.5)
CHLORIDE SERPL-SCNC: 103 MMOL/L (ref 95–110)
CHOLEST SERPL-MCNC: 132 MG/DL (ref 120–199)
CHOLEST/HDLC SERPL: 2.8 {RATIO} (ref 2–5)
CO2 SERPL-SCNC: 26 MMOL/L (ref 23–29)
CREAT SERPL-MCNC: 0.8 MG/DL (ref 0.5–1.4)
EST. GFR  (NO RACE VARIABLE): >60 ML/MIN/1.73 M^2
ESTIMATED AVG GLUCOSE: 131 MG/DL (ref 68–131)
GLUCOSE SERPL-MCNC: 117 MG/DL (ref 70–110)
HBA1C MFR BLD: 6.2 % (ref 4–5.6)
HDLC SERPL-MCNC: 48 MG/DL (ref 40–75)
HDLC SERPL: 36.4 % (ref 20–50)
LDLC SERPL CALC-MCNC: 75 MG/DL (ref 63–159)
NONHDLC SERPL-MCNC: 84 MG/DL
POTASSIUM SERPL-SCNC: 3.2 MMOL/L (ref 3.5–5.1)
PROT SERPL-MCNC: 6.8 G/DL (ref 6–8.4)
SODIUM SERPL-SCNC: 138 MMOL/L (ref 136–145)
TRIGL SERPL-MCNC: 45 MG/DL (ref 30–150)

## 2024-01-24 PROCEDURE — 80053 COMPREHEN METABOLIC PANEL: CPT | Performed by: FAMILY MEDICINE

## 2024-01-24 PROCEDURE — 83036 HEMOGLOBIN GLYCOSYLATED A1C: CPT | Performed by: FAMILY MEDICINE

## 2024-01-24 PROCEDURE — 36415 COLL VENOUS BLD VENIPUNCTURE: CPT | Mod: PN | Performed by: FAMILY MEDICINE

## 2024-01-24 PROCEDURE — 82306 VITAMIN D 25 HYDROXY: CPT | Performed by: FAMILY MEDICINE

## 2024-01-24 PROCEDURE — 80061 LIPID PANEL: CPT | Performed by: FAMILY MEDICINE

## 2024-01-25 ENCOUNTER — TELEPHONE (OUTPATIENT)
Dept: FAMILY MEDICINE | Facility: CLINIC | Age: 54
End: 2024-01-25
Payer: OTHER GOVERNMENT

## 2024-01-25 DIAGNOSIS — E66.09 CLASS 1 OBESITY DUE TO EXCESS CALORIES WITH SERIOUS COMORBIDITY AND BODY MASS INDEX (BMI) OF 32.0 TO 32.9 IN ADULT: ICD-10-CM

## 2024-01-25 NOTE — TELEPHONE ENCOUNTER
----- Message from David Joseph sent at 1/25/2024  8:30 AM CST -----  Contact: pt  Type:  Status on Appeal     Who Called: pt   Would the patient rather a call back or a response via [facility]? call  Best Call Back Number: [phone]  Additional Information:   Pt requesting a call regarding an appeal on chlorthalidone (HYGROTEN) 25 MG Tab

## 2024-01-29 ENCOUNTER — HOSPITAL ENCOUNTER (OUTPATIENT)
Dept: RADIOLOGY | Facility: HOSPITAL | Age: 54
Discharge: HOME | End: 2024-01-29
Attending: FAMILY MEDICINE
Payer: OTHER GOVERNMENT

## 2024-01-29 ENCOUNTER — OFFICE VISIT (OUTPATIENT)
Dept: FAMILY MEDICINE | Facility: CLINIC | Age: 54
End: 2024-01-29
Payer: OTHER GOVERNMENT

## 2024-01-29 VITALS
BODY MASS INDEX: 33.33 KG/M2 | OXYGEN SATURATION: 99 % | HEIGHT: 70 IN | WEIGHT: 232.81 LBS | HEART RATE: 69 BPM | TEMPERATURE: 98 F | DIASTOLIC BLOOD PRESSURE: 88 MMHG | SYSTOLIC BLOOD PRESSURE: 124 MMHG

## 2024-01-29 DIAGNOSIS — M89.9 FRONTAL SKULL LESION: ICD-10-CM

## 2024-01-29 DIAGNOSIS — I25.10 CORONARY ARTERY DISEASE INVOLVING NATIVE CORONARY ARTERY OF NATIVE HEART WITHOUT ANGINA PECTORIS: ICD-10-CM

## 2024-01-29 DIAGNOSIS — I10 ESSENTIAL HYPERTENSION: Primary | ICD-10-CM

## 2024-01-29 DIAGNOSIS — E87.6 DIURETIC-INDUCED HYPOKALEMIA: ICD-10-CM

## 2024-01-29 DIAGNOSIS — Z23 NEED FOR SHINGLES VACCINE: ICD-10-CM

## 2024-01-29 DIAGNOSIS — Z79.899 MEDICATION MANAGEMENT: ICD-10-CM

## 2024-01-29 DIAGNOSIS — T50.2X5A DIURETIC-INDUCED HYPOKALEMIA: ICD-10-CM

## 2024-01-29 DIAGNOSIS — I25.2 HISTORY OF NON-ST ELEVATION MYOCARDIAL INFARCTION (NSTEMI): ICD-10-CM

## 2024-01-29 DIAGNOSIS — E55.9 VITAMIN D DEFICIENCY: ICD-10-CM

## 2024-01-29 DIAGNOSIS — R73.03 PREDIABETES: ICD-10-CM

## 2024-01-29 PROCEDURE — 99215 OFFICE O/P EST HI 40 MIN: CPT | Mod: PBBFAC,PO | Performed by: FAMILY MEDICINE

## 2024-01-29 PROCEDURE — 99999 PR PBB SHADOW E&M-EST. PATIENT-LVL V: CPT | Mod: PBBFAC,,, | Performed by: FAMILY MEDICINE

## 2024-01-29 PROCEDURE — 70250 X-RAY EXAM OF SKULL: CPT | Mod: TC,FY

## 2024-01-29 PROCEDURE — 99214 OFFICE O/P EST MOD 30 MIN: CPT | Mod: S$PBB,,, | Performed by: FAMILY MEDICINE

## 2024-01-29 PROCEDURE — 99999PBSHW ZOSTER RECOMBINANT VACCINE: Mod: PBBFAC,,,

## 2024-01-29 PROCEDURE — 70250 X-RAY EXAM OF SKULL: CPT | Mod: 26,,, | Performed by: RADIOLOGY

## 2024-01-29 PROCEDURE — 90750 HZV VACC RECOMBINANT IM: CPT | Mod: PBBFAC,PO

## 2024-01-29 NOTE — PROGRESS NOTES
OFFICE VISIT    Last Office Visit: 8/5/2023  Last Annual Physical: 4/24/23    SUBJECTIVE:   Lizet Andrea is a 53 year old female who presents today for routine follow-up for management of chronic conditions which include: CAD h/o stent/NSTEMI, HTN, prediabetes, HLD, and obesity. She is feeling well today and overall healthy.     Acute complaints:   She reports a small, hard nodule on her forehead. She states that the nodule has been present for about two years. It has not changed over time. She denies precipitating injury. The nodule is not painful or tender. No skin changes.     Labs prior to visit 1/24/2024:  A1C 6.2%  Lipid Panel (Total 132/Triglycerides 45/HDL 48/LDL 75.0)  Vitamin D 25  CMP  - Potassium (low 3.2)       PAST MEDICAL HISTORY, SURGICAL/SOCIAL/FAMILY HISTORY REVIEWED AS PER CHART, WITH PERTINENT FINDINGS INCLUDED IN HISTORY SECTION OF NOTE.     Review of Systems   Constitutional:  Negative for fatigue and fever.   Eyes:  Negative for visual disturbance.   Respiratory:  Negative for chest tightness and shortness of breath.    Cardiovascular:  Negative for chest pain, palpitations and leg swelling.   Gastrointestinal:  Negative for abdominal pain, blood in stool and change in bowel habit.   Endocrine: Negative for polydipsia, polyphagia and polyuria.   Genitourinary:  Negative for dysuria and hematuria.   Musculoskeletal:  Negative for arthralgias, joint swelling and myalgias.   Integumentary:  Positive for mole/lesion (frontal skull nodule). Negative for rash and wound.   Allergic/Immunologic: Negative for immunocompromised state.   Neurological:  Negative for dizziness, syncope and weakness.   Hematological:  Does not bruise/bleed easily.   Psychiatric/Behavioral:  Negative for dysphoric mood and sleep disturbance. The patient is not nervous/anxious.           OBJECTIVE:     Current Outpatient Medications   Medication Instructions    amLODIPine (NORVASC) 10 mg, Oral, Daily    aspirin 81 mg,  "Oral, Daily    atorvastatin (LIPITOR) 80 mg, Oral, Daily    benzonatate (TESSALON) 200 mg, Oral, Every 8 hours PRN    carvediloL (COREG) 25 MG tablet TAKE 1 TABLET BY MOUTH TWICE DAILY WITH MEAL    cetirizine (ZYRTEC) 10 mg, Oral, Daily    chlorthalidone (HYGROTEN) 25 mg, Oral, Daily    cholecalciferol (vitamin D3) 5,000 Units, Oral, With breakfast    clindamycin (CLEOCIN T) 1 % lotion No dose, route, or frequency recorded.    ezetimibe (ZETIA) 10 mg, Oral, Daily    fluticasone propionate (FLONASE) 50 mcg, Each Nostril, Daily    ketoconazole (NIZORAL) 2 % cream Topical (Top), 2 times daily, Prn facial rash or dryness    losartan (COZAAR) 100 mg, Oral, Daily    metFORMIN (GLUCOPHAGE-XR) 500 MG ER 24hr tablet TAKE 2 TABLETS(1000MG) BY MOUTH DAILY WITH DINNER OR EVENING MEAL    nitroGLYCERIN (NITROSTAT) 0.4 mg, Sublingual, Every 5 min PRN    potassium chloride (K-TAB) 20 mEq 20 mEq, Oral, Daily    TRIAMCINOLONE ACETONIDE TOP No dose, route, or frequency recorded.    WEGOVY 0.25 mg, Subcutaneous, Every 7 days        /88   Pulse 69   Temp 98.3 °F (36.8 °C)   Ht 5' 10" (1.778 m)   Wt 105.6 kg (232 lb 12.9 oz)   SpO2 99%   BMI 33.40 kg/m²      Physical Exam  Vitals reviewed.   Constitutional:       Appearance: Normal appearance.   HENT:      Head: Mass (1 cm, firm nodule midline frontal skull) present.     Neck:      Vascular: No carotid bruit.   Cardiovascular:      Rate and Rhythm: Normal rate and regular rhythm.      Pulses: Normal pulses.      Heart sounds: Normal heart sounds. No murmur heard.  Pulmonary:      Effort: Pulmonary effort is normal. No respiratory distress.      Breath sounds: Normal breath sounds.   Musculoskeletal:      Cervical back: Normal range of motion.      Right lower leg: No edema.      Left lower leg: No edema.   Lymphadenopathy:      Cervical: No cervical adenopathy.   Neurological:      General: No focal deficit present.      Mental Status: She is alert and oriented to person, " place, and time.            ASSESSMENT & PLAN:  Diagnoses and all orders for this visit:    Orders Placed This Encounter    X-Ray Skull Ltd Less Than 4 Views    (In Office Administered) Zoster Recombinant Vaccine    Comprehensive Metabolic Panel    Hemoglobin A1C    Lipid Panel    CBC Auto Differential    TSH    Vitamin B12    Magnesium    Basic Metabolic Panel        Essential hypertension  - BP stable in office today. Continue home monitoring.   - The current medical regimen is effective. Continue current plan and medications: Amlodipine 10 mg, Carvedilol 25 mg, Chlorthalidone 25 mg, and Losartan 100 mg daily.     Coronary artery disease involving native coronary artery of native heart without angina pectoris  History of non-ST elevation myocardial infarction (NSTEMI)  - Followed up with Dr Cox 10/11/2023  - Continue OMT with ASA and Statin daily  - Stable coronary artery disease  - Nitroglycerin PRN    - Target LDL < 70. Continue Zetia and Lipitor 80 mg qhs. Most recent LDL 75  - Discussed lifestyle changes to diet and exercise to aid in the medical management of this problem.    - Recheck labs in 6 months prior to annual physical.      Prediabetes  - A1C 6.2%  - Currently on Metformin  mg once daily. Has been unable to tolerate higher doses of Metformin due to GI side effects.   - Have discussed adding GLP-1 agonist to current regimen, but have not been able to start due to coverage/cost concerns. She is considering obtaining compounded form through weight loss clinic.   - Discussed lifestyle changes to diet and exercise to aid in the medical management of this problem.    - Recheck labs in 6 months prior to annual physical.     Vitamin D deficiency  - She recently ran out of her Vitamin D supplements. Vitamin D level low (25) on most recent labs.  - Currently on Vitamin D3 5,000 IU daily. Continue this dose for now, will recheck Vitamin D level in 6 months prior to annual physical.     Diuretic-induced  hypokalemia  - Potassium 3.2 on most recent labs  - Currently on K-tab 20 mEq daily.   -would like to try incorporating more potassium rich foods in place of increasing her medication if possible as she does not like taking the large potassium pill.  - Recheck potassium level in 2 weeks. May need dose increase of K-tab.     Need for shingle vaccine   - Shingrix dose 2/2 given in office today    Frontal skull lesion  - Firm, bony nodule on midline frontal skull.   - Patient reports concern regarding its appearance, as it is becoming more noticeable to her. No associated pain, discoloration, or tenderness. It has not changed in size over time.   - Limited skull x-ray ordered for evaluation     Follow up in about 2 weeks (around 2/12/2024) for to follow up on lab results, return as needed for new concerns.       Assessment and plan of care discussed with Marta Louis MD. Attestation to follow.   Charissa Soto NP Student     I hereby acknowledge that I am relying upon documentation authored by a Nurse Practitioner student working under my supervision and further I hereby attest that I have verified the student documentation or findings by personally re-performing the physical exam and medical decision making activities of the Evaluation and Management service to be billed.  Marta Louis

## 2024-01-31 ENCOUNTER — PATIENT MESSAGE (OUTPATIENT)
Dept: OBSTETRICS AND GYNECOLOGY | Facility: CLINIC | Age: 54
End: 2024-01-31
Payer: OTHER GOVERNMENT

## 2024-02-15 ENCOUNTER — TELEPHONE (OUTPATIENT)
Dept: SPORTS MEDICINE | Facility: CLINIC | Age: 54
End: 2024-02-15
Payer: OTHER GOVERNMENT

## 2024-02-15 NOTE — TELEPHONE ENCOUNTER
Spoke to the patient in regard to her appointment today with Jen Suárez PA-C. While on the call I informed the patient that Jen Suárez PA-C is out sick. Patient rescheduled to 3/7 at 12:30 pm at the [facility].

## 2024-03-13 ENCOUNTER — LAB VISIT (OUTPATIENT)
Dept: LAB | Facility: HOSPITAL | Age: 54
End: 2024-03-13
Attending: ALLERGY & IMMUNOLOGY
Payer: OTHER GOVERNMENT

## 2024-03-13 ENCOUNTER — OFFICE VISIT (OUTPATIENT)
Dept: ALLERGY | Facility: CLINIC | Age: 54
End: 2024-03-13
Payer: OTHER GOVERNMENT

## 2024-03-13 VITALS — WEIGHT: 226.63 LBS | HEIGHT: 70 IN | BODY MASS INDEX: 32.45 KG/M2

## 2024-03-13 DIAGNOSIS — K90.49 FOOD INTOLERANCE IN ADULT: Primary | ICD-10-CM

## 2024-03-13 DIAGNOSIS — Z91.018 HX OF FOOD ALLERGY: ICD-10-CM

## 2024-03-13 PROCEDURE — 36415 COLL VENOUS BLD VENIPUNCTURE: CPT | Performed by: ALLERGY & IMMUNOLOGY

## 2024-03-13 PROCEDURE — 99204 OFFICE O/P NEW MOD 45 MIN: CPT | Mod: S$PBB,,, | Performed by: ALLERGY & IMMUNOLOGY

## 2024-03-13 PROCEDURE — 99213 OFFICE O/P EST LOW 20 MIN: CPT | Mod: PBBFAC | Performed by: ALLERGY & IMMUNOLOGY

## 2024-03-13 PROCEDURE — 86003 ALLG SPEC IGE CRUDE XTRC EA: CPT | Mod: 59 | Performed by: ALLERGY & IMMUNOLOGY

## 2024-03-13 PROCEDURE — 99999 PR PBB SHADOW E&M-EST. PATIENT-LVL III: CPT | Mod: PBBFAC,,, | Performed by: ALLERGY & IMMUNOLOGY

## 2024-03-13 PROCEDURE — 86003 ALLG SPEC IGE CRUDE XTRC EA: CPT | Performed by: ALLERGY & IMMUNOLOGY

## 2024-03-13 NOTE — PROGRESS NOTES
"Subjective:       Patient ID: Lizet Andrea is a 54 y.o. female.    Chief Complaint:  Other (Discuss shellfish allergy )      HPI    Pt presents w concern of possible shrimp, crab allergy. Over last 4 years has   Inconsistently note small "blotches" on face w/in 20 min of eating shrimp, lasting about 24 hours.  Sometimes has assoc itching w/o rash. With crab ingestion has same sx's, itching more prominent than rash. These sx's only occur about half or less than half the time that she eats shrimp or crab. Uncertain if paritcular preparations are more likely to trigger sx's. Sx's have not worsened over 4 years. Sx's resolve w /o tx't. No hx extracutaneous sx's. Benadryl may minimize itching.  Continues to eat these foods.    No AR  No asthma, no eczema        Past Medical History:   Diagnosis Date    Accelerated hypertension 1/1/2015    Allergic rhinitis 1/3/2015    Coronary artery disease 7/2/2016    Coronary artery disease involving native coronary artery of native heart without angina pectoris 7/2/2016    Hepatitis C antibody test positive 5/4/2016    Negative HCV RNA 05/2016, will check again in 3 months but no detectable HCV virus HCV AB will likely remain positive for life    History of non-ST elevation myocardial infarction (NSTEMI) 7/2/2016    Menorrhagia     Obesity (BMI 30-39.9) 1/2/2015    Prediabetes 3/14/2018    Status post coronary artery stent placement 1/19/2015    LCx stent 1/12015        Family History   Problem Relation Age of Onset    Hypertension Mother     Diabetes Maternal Aunt     Hypertension Maternal Aunt     Cancer Neg Hx     Heart disease Neg Hx          Review of Systems   Constitutional:  Negative for activity change, fatigue and fever.   HENT:  Negative for congestion, postnasal drip, rhinorrhea, sinus pressure and sneezing.    Eyes:  Negative for discharge, redness and itching.   Respiratory:  Negative for cough, shortness of breath and wheezing.    Cardiovascular:  Negative for " "chest pain.   Gastrointestinal:  Negative for diarrhea, nausea and vomiting.   Genitourinary:  Negative for dysuria.   Musculoskeletal:  Negative for arthralgias and joint swelling.   Skin:  Negative for rash.   Neurological:  Negative for headaches.   Hematological:  Does not bruise/bleed easily.   Psychiatric/Behavioral:  Negative for behavioral problems and sleep disturbance.         Objective:   Physical Exam  Vitals and nursing note reviewed.   Constitutional:       General: She is not in acute distress.     Appearance: She is well-developed.   HENT:      Head: Normocephalic.      Right Ear: External ear normal.      Left Ear: External ear normal.      Nose: No septal deviation, mucosal edema or rhinorrhea.      Right Sinus: No maxillary sinus tenderness or frontal sinus tenderness.      Left Sinus: No maxillary sinus tenderness or frontal sinus tenderness.      Mouth/Throat:      Pharynx: Uvula midline. No uvula swelling.   Eyes:      General:         Right eye: No discharge.         Left eye: No discharge.   Cardiovascular:      Rate and Rhythm: Normal rate.   Pulmonary:      Effort: Pulmonary effort is normal. No respiratory distress.   Abdominal:      General: There is no distension.   Musculoskeletal:         General: No tenderness.      Cervical back: Normal range of motion and neck supple.   Skin:     Findings: No rash.   Neurological:      Mental Status: She is alert and oriented to person, place, and time.   Psychiatric:         Behavior: Behavior normal.           No results found for: "IGGSERUM", "IGM", "IGA", "IGE"  Eos #   Date Value Ref Range Status   08/24/2023 0.1 0.0 - 0.5 K/uL Final   05/09/2022 0.1 0.0 - 0.5 K/uL Final   01/26/2022 0.1 0.0 - 0.5 K/uL Final     Eosinophil %   Date Value Ref Range Status   08/24/2023 1.5 0.0 - 8.0 % Final   05/09/2022 1.2 0.0 - 8.0 % Final   01/26/2022 1.2 0.0 - 8.0 % Final           Assessment:       1. Food intolerance in adult     Favor intolerance over " allergy   Hx inconsistent w IgE mediated allergy     Plan:       Lizet was seen today for other.    Diagnoses and all orders for this visit:    Food intolerance in adult  -     Allergen Shrimp IgE; Future  -     Allergen Crab IgE; Future    Pt would like to proceed w testing. If neg, ok to continue eating. May have intolerance, but low risk anaphylaxis. If positives, consider observed challenge, given hx atypical of IgE mediated allergy           Total of 45 minutes on encounter the day of the visit. This includes face to face time and non-face to face time preparing to see the patient (eg, review of tests), obtaining and/or reviewing separately obtained history, documenting clinical information in the electronic or other health record, independently interpreting results and communicating results to the patient/family/caregiver, or care coordinator.

## 2024-03-14 DIAGNOSIS — E66.09 CLASS 1 OBESITY DUE TO EXCESS CALORIES WITH SERIOUS COMORBIDITY AND BODY MASS INDEX (BMI) OF 32.0 TO 32.9 IN ADULT: ICD-10-CM

## 2024-03-14 DIAGNOSIS — L21.9 SEBORRHEIC DERMATITIS: ICD-10-CM

## 2024-03-14 DIAGNOSIS — E55.9 VITAMIN D DEFICIENCY: ICD-10-CM

## 2024-03-14 RX ORDER — KETOCONAZOLE 20 MG/G
CREAM TOPICAL 2 TIMES DAILY
Qty: 45 G | Refills: 2 | Status: SHIPPED | OUTPATIENT
Start: 2024-03-14

## 2024-03-14 RX ORDER — CHOLECALCIFEROL (VITAMIN D3) 125 MCG
5000 CAPSULE ORAL
Qty: 100 CAPSULE | Refills: 4 | Status: SHIPPED | OUTPATIENT
Start: 2024-03-14 | End: 2024-06-12 | Stop reason: SDUPTHER

## 2024-03-14 RX ORDER — SEMAGLUTIDE 0.25 MG/.5ML
0.25 INJECTION, SOLUTION SUBCUTANEOUS
Qty: 2 ML | Refills: 1 | Status: SHIPPED | OUTPATIENT
Start: 2024-03-14 | End: 2024-06-12 | Stop reason: ALTCHOICE

## 2024-03-14 NOTE — TELEPHONE ENCOUNTER
No care due was identified.  [facility] Embedded Care Due Messages. Reference number: [id number].   3/14/2024 6:51:17 AM CDT

## 2024-03-18 LAB
CRAB IGE QN: <0.1 KU/L
DEPRECATED CRAB IGE RAST QL: NORMAL
DEPRECATED SHRIMP IGE RAST QL: NORMAL
SHRIMP IGE QN: <0.1 KU/L

## 2024-05-23 ENCOUNTER — TELEPHONE (OUTPATIENT)
Dept: CARDIOLOGY | Facility: CLINIC | Age: 54
End: 2024-05-23
Payer: OTHER GOVERNMENT

## 2024-05-23 ENCOUNTER — PATIENT MESSAGE (OUTPATIENT)
Dept: CARDIOLOGY | Facility: CLINIC | Age: 54
End: 2024-05-23
Payer: OTHER GOVERNMENT

## 2024-05-23 NOTE — TELEPHONE ENCOUNTER
Patient was scheduled for 07/09/2024 has been changed to 08/15/2024 @3:20 due to provider being out of office/letter will be sent out

## 2024-06-10 ENCOUNTER — LAB VISIT (OUTPATIENT)
Dept: LAB | Facility: HOSPITAL | Age: 54
End: 2024-06-10
Attending: FAMILY MEDICINE
Payer: OTHER GOVERNMENT

## 2024-06-10 DIAGNOSIS — I25.10 CORONARY ARTERY DISEASE INVOLVING NATIVE CORONARY ARTERY OF NATIVE HEART WITHOUT ANGINA PECTORIS: ICD-10-CM

## 2024-06-10 DIAGNOSIS — R73.03 PREDIABETES: ICD-10-CM

## 2024-06-10 DIAGNOSIS — T50.2X5A DIURETIC-INDUCED HYPOKALEMIA: ICD-10-CM

## 2024-06-10 DIAGNOSIS — Z79.899 MEDICATION MANAGEMENT: ICD-10-CM

## 2024-06-10 DIAGNOSIS — I10 ESSENTIAL HYPERTENSION: ICD-10-CM

## 2024-06-10 DIAGNOSIS — I25.2 HISTORY OF NON-ST ELEVATION MYOCARDIAL INFARCTION (NSTEMI): ICD-10-CM

## 2024-06-10 DIAGNOSIS — E87.6 DIURETIC-INDUCED HYPOKALEMIA: ICD-10-CM

## 2024-06-10 LAB
ALBUMIN SERPL BCP-MCNC: 3.6 G/DL (ref 3.5–5.2)
ALP SERPL-CCNC: 39 U/L (ref 55–135)
ALT SERPL W/O P-5'-P-CCNC: 14 U/L (ref 10–44)
ANION GAP SERPL CALC-SCNC: 12 MMOL/L (ref 8–16)
AST SERPL-CCNC: 22 U/L (ref 10–40)
BASOPHILS # BLD AUTO: 0.04 K/UL (ref 0–0.2)
BASOPHILS NFR BLD: 0.9 % (ref 0–1.9)
BILIRUB SERPL-MCNC: 0.4 MG/DL (ref 0.1–1)
BUN SERPL-MCNC: 13 MG/DL (ref 6–20)
CALCIUM SERPL-MCNC: 9.8 MG/DL (ref 8.7–10.5)
CHLORIDE SERPL-SCNC: 107 MMOL/L (ref 95–110)
CHOLEST SERPL-MCNC: 131 MG/DL (ref 120–199)
CHOLEST/HDLC SERPL: 2.6 {RATIO} (ref 2–5)
CO2 SERPL-SCNC: 21 MMOL/L (ref 23–29)
CREAT SERPL-MCNC: 0.7 MG/DL (ref 0.5–1.4)
DIFFERENTIAL METHOD BLD: ABNORMAL
EOSINOPHIL # BLD AUTO: 0 K/UL (ref 0–0.5)
EOSINOPHIL NFR BLD: 0.9 % (ref 0–8)
ERYTHROCYTE [DISTWIDTH] IN BLOOD BY AUTOMATED COUNT: 13.2 % (ref 11.5–14.5)
EST. GFR  (NO RACE VARIABLE): >60 ML/MIN/1.73 M^2
ESTIMATED AVG GLUCOSE: 108 MG/DL (ref 68–131)
GLUCOSE SERPL-MCNC: 79 MG/DL (ref 70–110)
HBA1C MFR BLD: 5.4 % (ref 4–5.6)
HCT VFR BLD AUTO: 42.6 % (ref 37–48.5)
HDLC SERPL-MCNC: 51 MG/DL (ref 40–75)
HDLC SERPL: 38.9 % (ref 20–50)
HGB BLD-MCNC: 13.7 G/DL (ref 12–16)
IMM GRANULOCYTES # BLD AUTO: 0.01 K/UL (ref 0–0.04)
IMM GRANULOCYTES NFR BLD AUTO: 0.2 % (ref 0–0.5)
LDLC SERPL CALC-MCNC: 72.8 MG/DL (ref 63–159)
LYMPHOCYTES # BLD AUTO: 2 K/UL (ref 1–4.8)
LYMPHOCYTES NFR BLD: 45.1 % (ref 18–48)
MAGNESIUM SERPL-MCNC: 2 MG/DL (ref 1.6–2.6)
MCH RBC QN AUTO: 31.1 PG (ref 27–31)
MCHC RBC AUTO-ENTMCNC: 32.2 G/DL (ref 32–36)
MCV RBC AUTO: 97 FL (ref 82–98)
MONOCYTES # BLD AUTO: 0.6 K/UL (ref 0.3–1)
MONOCYTES NFR BLD: 12.7 % (ref 4–15)
NEUTROPHILS # BLD AUTO: 1.8 K/UL (ref 1.8–7.7)
NEUTROPHILS NFR BLD: 40.2 % (ref 38–73)
NONHDLC SERPL-MCNC: 80 MG/DL
NRBC BLD-RTO: 0 /100 WBC
PLATELET # BLD AUTO: 196 K/UL (ref 150–450)
PMV BLD AUTO: 11.8 FL (ref 9.2–12.9)
POTASSIUM SERPL-SCNC: 3.6 MMOL/L (ref 3.5–5.1)
PROT SERPL-MCNC: 7 G/DL (ref 6–8.4)
RBC # BLD AUTO: 4.41 M/UL (ref 4–5.4)
SODIUM SERPL-SCNC: 140 MMOL/L (ref 136–145)
TRIGL SERPL-MCNC: 36 MG/DL (ref 30–150)
TSH SERPL DL<=0.005 MIU/L-ACNC: 1.12 UIU/ML (ref 0.4–4)
VIT B12 SERPL-MCNC: >2000 PG/ML (ref 210–950)
WBC # BLD AUTO: 4.41 K/UL (ref 3.9–12.7)

## 2024-06-10 PROCEDURE — 83036 HEMOGLOBIN GLYCOSYLATED A1C: CPT | Performed by: FAMILY MEDICINE

## 2024-06-10 PROCEDURE — 80061 LIPID PANEL: CPT | Performed by: FAMILY MEDICINE

## 2024-06-10 PROCEDURE — 85025 COMPLETE CBC W/AUTO DIFF WBC: CPT | Performed by: FAMILY MEDICINE

## 2024-06-10 PROCEDURE — 83735 ASSAY OF MAGNESIUM: CPT | Performed by: FAMILY MEDICINE

## 2024-06-10 PROCEDURE — 36415 COLL VENOUS BLD VENIPUNCTURE: CPT | Performed by: FAMILY MEDICINE

## 2024-06-10 PROCEDURE — 82607 VITAMIN B-12: CPT | Performed by: FAMILY MEDICINE

## 2024-06-10 PROCEDURE — 84443 ASSAY THYROID STIM HORMONE: CPT | Performed by: FAMILY MEDICINE

## 2024-06-10 PROCEDURE — 80053 COMPREHEN METABOLIC PANEL: CPT | Performed by: FAMILY MEDICINE

## 2024-06-12 ENCOUNTER — OFFICE VISIT (OUTPATIENT)
Dept: FAMILY MEDICINE | Facility: CLINIC | Age: 54
End: 2024-06-12
Payer: OTHER GOVERNMENT

## 2024-06-12 VITALS
BODY MASS INDEX: 32.63 KG/M2 | SYSTOLIC BLOOD PRESSURE: 132 MMHG | OXYGEN SATURATION: 100 % | DIASTOLIC BLOOD PRESSURE: 82 MMHG | HEART RATE: 73 BPM | WEIGHT: 227.94 LBS | TEMPERATURE: 98 F | HEIGHT: 70 IN

## 2024-06-12 DIAGNOSIS — E66.09 CLASS 1 OBESITY DUE TO EXCESS CALORIES WITH SERIOUS COMORBIDITY AND BODY MASS INDEX (BMI) OF 32.0 TO 32.9 IN ADULT: ICD-10-CM

## 2024-06-12 DIAGNOSIS — I25.2 HISTORY OF NON-ST ELEVATION MYOCARDIAL INFARCTION (NSTEMI): ICD-10-CM

## 2024-06-12 DIAGNOSIS — I10 ESSENTIAL HYPERTENSION: ICD-10-CM

## 2024-06-12 DIAGNOSIS — Z79.899 MEDICATION MANAGEMENT: ICD-10-CM

## 2024-06-12 DIAGNOSIS — I25.10 CORONARY ARTERY DISEASE INVOLVING NATIVE CORONARY ARTERY OF NATIVE HEART WITHOUT ANGINA PECTORIS: ICD-10-CM

## 2024-06-12 DIAGNOSIS — Z95.5 STATUS POST CORONARY ARTERY STENT PLACEMENT: ICD-10-CM

## 2024-06-12 DIAGNOSIS — E55.9 VITAMIN D DEFICIENCY: ICD-10-CM

## 2024-06-12 DIAGNOSIS — R73.03 PREDIABETES: ICD-10-CM

## 2024-06-12 DIAGNOSIS — Z79.82 LONG-TERM USE OF ASPIRIN THERAPY: ICD-10-CM

## 2024-06-12 DIAGNOSIS — T50.2X5A DIURETIC-INDUCED HYPOKALEMIA: ICD-10-CM

## 2024-06-12 DIAGNOSIS — E87.6 DIURETIC-INDUCED HYPOKALEMIA: ICD-10-CM

## 2024-06-12 DIAGNOSIS — R61 NIGHT SWEAT: ICD-10-CM

## 2024-06-12 DIAGNOSIS — Z00.00 ROUTINE GENERAL MEDICAL EXAMINATION AT A HEALTH CARE FACILITY: Primary | ICD-10-CM

## 2024-06-12 DIAGNOSIS — Z12.31 SCREENING MAMMOGRAM FOR BREAST CANCER: ICD-10-CM

## 2024-06-12 PROCEDURE — 99396 PREV VISIT EST AGE 40-64: CPT | Mod: S$PBB,,, | Performed by: FAMILY MEDICINE

## 2024-06-12 PROCEDURE — 99999 PR PBB SHADOW E&M-EST. PATIENT-LVL V: CPT | Mod: PBBFAC,,, | Performed by: FAMILY MEDICINE

## 2024-06-12 PROCEDURE — 99215 OFFICE O/P EST HI 40 MIN: CPT | Mod: PBBFAC,PO | Performed by: FAMILY MEDICINE

## 2024-06-12 RX ORDER — METFORMIN HYDROCHLORIDE 500 MG/1
500 TABLET, EXTENDED RELEASE ORAL
Qty: 90 TABLET | Refills: 4 | Status: SHIPPED | OUTPATIENT
Start: 2024-06-12

## 2024-06-12 RX ORDER — CHOLECALCIFEROL (VITAMIN D3) 125 MCG
5000 CAPSULE ORAL
Qty: 100 CAPSULE | Refills: 4 | Status: SHIPPED | OUTPATIENT
Start: 2024-06-12

## 2024-06-12 NOTE — PROGRESS NOTES
Office Visit    Patient Name: Lizet Andrea    : 1970  MRN: [id number]    Subjective:  Lizet is a 54 y.o. female who presents today for:    Annual Exam    Last Office Visit: 24   Last Annual Physical: 23    Currently following with [facility] and on Semaglutide 1.5 mg weekly-- has been on the medication now for about 4.5 months.      SUBJECTIVE:   Lizet Andrea is a 54 year old female who presents today for annual physical with lab review and follow-up for management of chronic conditions which include: CAD h/o stent/NSTEMI, HTN, prediabetes, HLD, and obesity. She is feeling well today and overall healthy.      Acute complaints: NONE     Labs prior to visit 6/10/24 show significant improvement in A1c to 5.4 down from 6.2. LDL 72, vitamin D not checked but previously low and off and on on the 5,000 IU D3 daily.   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        Immunizations: declines FLU shot, TDaP 10/25/2017, COVID-19 VACCINE SERIES COMPLETED 2/3/21 w/ Pfizer BOOSTER 22, SHINGRIX 2/2 today 24     Screening Tests: mammogram 23-- repeat 1 year & ORDERED,  PAP/HPV WNL/(-) 21- REPEAT 5 YRS (2026), HIV (-) 3/16/20, Hep C (-) 3/16/20,  colonoscopy 23-- repeat 10 years      Eye/Dental exams: UTD with both and no concerns    PAST MEDICAL HISTORY, SURGICAL/SOCIAL/FAMILY HISTORY REVIEWED AS PER CHART, WITH PERTINENT FINDINGS INCLUDED IN HISTORY SECTION OF NOTE.      Current Medications    Medication List with Changes/Refills   Current Medications    AMLODIPINE (NORVASC) 10 MG TABLET    Take 1 tablet (10 mg total) by mouth once daily.    ASPIRIN 81 MG CHEW    Take 81 mg by mouth once daily.    ATORVASTATIN (LIPITOR) 80 MG TABLET    Take 1 tablet (80 mg total) by mouth once daily.    BENZONATATE (TESSALON) 100 MG CAPSULE    Take 200 mg by mouth every 8 (eight) hours as needed.    CARVEDILOL (COREG) 25 MG TABLET    TAKE 1 TABLET BY MOUTH TWICE DAILY WITH MEAL    CETIRIZINE (ZYRTEC) 10 MG TABLET    Take 1 tablet (10 mg total) by mouth once daily.    CHLORTHALIDONE (HYGROTEN) 25 MG TAB    Take 1 tablet (25 mg total) by mouth once daily.    CLINDAMYCIN (CLEOCIN T) 1 % LOTION        EZETIMIBE (ZETIA) 10 MG TABLET    Take 1 tablet (10 mg total) by mouth once daily.    FLUTICASONE PROPIONATE (FLONASE) 50 MCG/ACTUATION NASAL SPRAY    1 spray (50 mcg total) by Each Nostril route once daily.    KETOCONAZOLE (NIZORAL) 2 % CREAM    Apply topically 2 (two) times daily. Prn facial rash or dryness    LOSARTAN (COZAAR) 100 MG TABLET    Take 1 tablet (100 mg total) by mouth once daily.    NITROGLYCERIN (NITROSTAT) 0.4 MG SL TABLET    Place 1 tablet (0.4 mg total) under the tongue every 5 (five) minutes as needed for Chest pain.    POTASSIUM CHLORIDE (K-TAB) 20 MEQ    Take 1 tablet (20 mEq total) by mouth once daily.    TRIAMCINOLONE ACETONIDE TOP       Changed and/or Refilled Medications    Modified Medication Previous Medication    CHOLECALCIFEROL, VITAMIN D3, 125 MCG (5,000 UNIT) CAPSULE cholecalciferol, vitamin D3, 125 mcg (5,000 unit) capsule       Take 1 capsule (5,000 Units total) by mouth daily with breakfast.    Take 1 capsule (5,000 Units total) by mouth daily with breakfast.    METFORMIN (GLUCOPHAGE-XR) 500 MG ER 24HR TABLET metFORMIN (GLUCOPHAGE-XR) 500 MG ER 24hr tablet       Take 1 tablet (500 mg total) by mouth daily with dinner or evening meal.    TAKE 2 TABLETS(1000MG) BY  "MOUTH DAILY WITH DINNER OR EVENING MEAL   Discontinued Medications    SEMAGLUTIDE, WEIGHT LOSS, (WEGOVY) 0.25 MG/0.5 ML PNIJ    Inject 0.25 mg into the skin every 7 days.       Allergies   Review of patient's allergies indicates:   Allergen Reactions    Crab Rash    Shrimp Rash         Review of Systems (Pertinent positives)  Review of Systems   Constitutional:  Negative for unexpected weight change.   HENT:  Negative for trouble swallowing.    Eyes:  Negative for visual disturbance.   Respiratory:  Negative for chest tightness and shortness of breath.    Cardiovascular:  Negative for chest pain, palpitations and leg swelling.   Gastrointestinal:  Negative for constipation, diarrhea, nausea and vomiting.   Genitourinary:  Negative for difficulty urinating.   Neurological:  Negative for dizziness, light-headedness and headaches.   Psychiatric/Behavioral:  Negative for sleep disturbance.        /82 (BP Location: Right arm, Patient Position: Sitting, BP Method: Large (Manual))   Pulse 73   Temp 98.1 °F (36.7 °C)   Ht 5' 10" (1.778 m)   Wt 103.4 kg (227 lb 15.3 oz)   LMP 05/23/2024 (Exact Date)   SpO2 100%   BMI 32.71 kg/m²     Physical Exam  Vitals reviewed.   Constitutional:       General: She is not in acute distress.     Appearance: Normal appearance. She is well-developed. She is obese.   HENT:      Head: Normocephalic and atraumatic.      Right Ear: Tympanic membrane and ear canal normal. Tympanic membrane is not erythematous or bulging.      Left Ear: Tympanic membrane and ear canal normal. Tympanic membrane is not erythematous or bulging.      Nose: Nose normal.      Mouth/Throat:      Mouth: Mucous membranes are moist.      Pharynx: No oropharyngeal exudate or posterior oropharyngeal erythema.   Eyes:      Extraocular Movements: Extraocular movements intact.      Conjunctiva/sclera: Conjunctivae normal.   Neck:      Thyroid: No thyroid mass or thyromegaly.      Vascular: No carotid bruit. "   Cardiovascular:      Rate and Rhythm: Normal rate and regular rhythm.      Pulses:           Dorsalis pedis pulses are 2+ on the right side and 2+ on the left side.      Heart sounds: Normal heart sounds. No murmur heard.  Pulmonary:      Effort: Pulmonary effort is normal. No respiratory distress.      Breath sounds: Normal breath sounds.   Abdominal:      General: Bowel sounds are normal. There is no distension.      Palpations: Abdomen is soft. There is no mass.      Tenderness: There is no abdominal tenderness.   Musculoskeletal:         General: Normal range of motion.      Right lower leg: No edema.      Left lower leg: No edema.   Lymphadenopathy:      Cervical: No cervical adenopathy.   Skin:     General: Skin is warm and dry.      Findings: No rash.   Neurological:      General: No focal deficit present.      Mental Status: She is alert and oriented to person, place, and time.   Psychiatric:         Mood and Affect: Mood normal.         Behavior: Behavior normal.           Assessment/Plan:  Lizet Andrea is a 54 y.o. female who presents today for :        ICD-10-CM ICD-9-CM    1. Routine general medical examination at a health care facility  Z00.00 V70.0       2. Class 1 obesity due to excess calories with serious comorbidity and body mass index (BMI) of 32.0 to 32.9 in adult  E66.09 278.00     Z68.32 V85.32       3. Screening mammogram for breast cancer  Z12.31 V76.12 Mammo Digital Screening Bilat      4. Prediabetes  R73.03 790.29 Hemoglobin A1C      Comprehensive Metabolic Panel      Lipid Panel      Vitamin D      Vitamin B12      metFORMIN (GLUCOPHAGE-XR) 500 MG ER 24hr tablet      5. Essential hypertension  I10 401.9 Hemoglobin A1C      Comprehensive Metabolic Panel      Lipid Panel      Vitamin D      Vitamin B12      6. Vitamin D deficiency  E55.9 268.9 Vitamin D      cholecalciferol, vitamin D3, 125 mcg (5,000 unit) capsule      7. Diuretic-induced hypokalemia  E87.6 276.8     T50.2X5A  E944.4       8. Status post coronary artery stent placement  Z95.5 V45.82 Hemoglobin A1C      Comprehensive Metabolic Panel      Lipid Panel      Vitamin D      Vitamin B12      9. History of non-ST elevation myocardial infarction (NSTEMI)  I25.2 412       10. Coronary artery disease involving native coronary artery of native heart without angina pectoris  I25.10 414.01       11. Long-term use of aspirin therapy  Z79.82 V58.66       12. Medication management  Z79.899 V58.69 Hemoglobin A1C      Comprehensive Metabolic Panel      Lipid Panel      Vitamin D      Vitamin B12      FOLLICLE STIMULATING HORMONE      13. Night sweat  R61 780.8 FOLLICLE STIMULATING HORMONE        ADVISED ON DIET/EXERCISE/SLEEP, ROUTINE EYE/DENTAL EXAMS, AND THE IMPORTANCE OF KEEPING UP WITH APPROPRIATE SCREENING TESTS BASED ON AGE AND RISK FACTORS.  HEALTH MAINTENANCE REVIEWED AND UP-TO-DATE INCLUDING MAMMOGRAM, COLONOSCOPY, PAP EXCEPT SHE DECLINES SEASONAL FLU AND COVID VACCINES.        Essential hypertension  - BP stable in office today. Continue home monitoring.   - The current medical regimen is effective. Continue current plan and medications: Amlodipine 10 mg, Carvedilol 25 mg, Chlorthalidone 25 mg, and Losartan 100 mg daily.      Coronary artery disease involving native coronary artery of native heart without angina pectoris  History of non-ST elevation myocardial infarction (NSTEMI)  - Followed up with Dr Cox 10/11/2023  - Continue ASA and Statin daily  - Stable coronary artery disease  - Nitroglycerin PRN    - Target LDL < 70. Continue Zetia and Lipitor 80 mg qhs. Most recent LDL 72  -blood pressure with good control on current regimen l.      Prediabetes associated with OBESITY   -has lost about 5 lb in the last several months on GLP 1 but A1c has improved significantly  - A1C WITH SIGNIFICANT IMPROVEMENT FROM 6.2 DOWN TO 5.4 ON WEEKLY SEMAGLUTIDE INJECTIONS AND DAILY 500 MG EXTENDED RELEASE METFORMIN.  ON SEMAGLUTIDE PER  [facility]  -advised to continue GLP 1, metformin but needs to add in some form of physical activity.  Consider a nutrition consult.  Continue attention to low carb diet.    -repeat labs in 6 months     Vitamin D deficiency  - She recently ran out of her Vitamin D supplements. Vitamin D level low (25) on most recent labs.  - Currently on Vitamin D3 5,000 IU daily but inconsistent--plans to take more regularly, prescription sent .  will recheck Vitamin D level in 6 months.     Diuretic-induced hypokalemia  - Potassium WNL on most recent labs  - Currently on K-tab 20 mEq daily.   -continue current regimen         There are no Patient Instructions on file for this visit.      Follow up in about 6 months (around 12/12/2024) for to follow up on lab results, return as needed for new concerns.

## 2024-06-17 ENCOUNTER — OFFICE VISIT (OUTPATIENT)
Dept: URGENT CARE | Facility: CLINIC | Age: 54
End: 2024-06-17
Payer: OTHER GOVERNMENT

## 2024-06-17 VITALS
RESPIRATION RATE: 20 BRPM | WEIGHT: 227 LBS | TEMPERATURE: 100 F | HEIGHT: 70 IN | DIASTOLIC BLOOD PRESSURE: 100 MMHG | SYSTOLIC BLOOD PRESSURE: 137 MMHG | BODY MASS INDEX: 32.5 KG/M2 | HEART RATE: 86 BPM | OXYGEN SATURATION: 98 %

## 2024-06-17 DIAGNOSIS — R50.9 FEVER, UNSPECIFIED FEVER CAUSE: ICD-10-CM

## 2024-06-17 DIAGNOSIS — J34.89 SINUS PRESSURE: ICD-10-CM

## 2024-06-17 DIAGNOSIS — J20.9 ACUTE PURULENT BRONCHITIS: Primary | ICD-10-CM

## 2024-06-17 DIAGNOSIS — R06.2 WHEEZING: ICD-10-CM

## 2024-06-17 DIAGNOSIS — B96.89 ACUTE BACTERIAL RHINOSINUSITIS: ICD-10-CM

## 2024-06-17 DIAGNOSIS — J01.90 ACUTE BACTERIAL RHINOSINUSITIS: ICD-10-CM

## 2024-06-17 DIAGNOSIS — R05.1 ACUTE COUGH: ICD-10-CM

## 2024-06-17 LAB
CTP QC/QA: YES
SARS-COV-2 AG RESP QL IA.RAPID: NEGATIVE

## 2024-06-17 PROCEDURE — 87811 SARS-COV-2 COVID19 W/OPTIC: CPT | Mod: QW,S$GLB,,

## 2024-06-17 PROCEDURE — 71046 X-RAY EXAM CHEST 2 VIEWS: CPT | Mod: S$GLB,,, | Performed by: RADIOLOGY

## 2024-06-17 PROCEDURE — 94640 AIRWAY INHALATION TREATMENT: CPT | Mod: S$GLB,,,

## 2024-06-17 PROCEDURE — 99213 OFFICE O/P EST LOW 20 MIN: CPT | Mod: 25,S$GLB,,

## 2024-06-17 PROCEDURE — 96372 THER/PROPH/DIAG INJ SC/IM: CPT | Mod: 59,S$GLB,,

## 2024-06-17 RX ORDER — IPRATROPIUM BROMIDE 0.5 MG/2.5ML
0.5 SOLUTION RESPIRATORY (INHALATION)
Status: COMPLETED | OUTPATIENT
Start: 2024-06-17 | End: 2024-06-17

## 2024-06-17 RX ORDER — ALBUTEROL SULFATE 90 UG/1
2 AEROSOL, METERED RESPIRATORY (INHALATION) EVERY 6 HOURS PRN
Qty: 18 G | Refills: 0 | Status: SHIPPED | OUTPATIENT
Start: 2024-06-17

## 2024-06-17 RX ORDER — ALBUTEROL SULFATE 0.83 MG/ML
2.5 SOLUTION RESPIRATORY (INHALATION)
Status: COMPLETED | OUTPATIENT
Start: 2024-06-17 | End: 2024-06-17

## 2024-06-17 RX ORDER — ACETAMINOPHEN 500 MG
1000 TABLET ORAL
Status: COMPLETED | OUTPATIENT
Start: 2024-06-17 | End: 2024-06-17

## 2024-06-17 RX ORDER — AZITHROMYCIN 250 MG/1
TABLET, FILM COATED ORAL
Qty: 6 TABLET | Refills: 0 | Status: SHIPPED | OUTPATIENT
Start: 2024-06-17 | End: 2024-06-22

## 2024-06-17 RX ORDER — DEXAMETHASONE SODIUM PHOSPHATE 100 MG/10ML
10 INJECTION INTRAMUSCULAR; INTRAVENOUS ONCE
Status: COMPLETED | OUTPATIENT
Start: 2024-06-17 | End: 2024-06-17

## 2024-06-17 RX ADMIN — ALBUTEROL SULFATE 2.5 MG: 0.83 SOLUTION RESPIRATORY (INHALATION) at 03:06

## 2024-06-17 RX ADMIN — IPRATROPIUM BROMIDE 0.5 MG: 0.5 SOLUTION RESPIRATORY (INHALATION) at 03:06

## 2024-06-17 RX ADMIN — DEXAMETHASONE SODIUM PHOSPHATE 10 MG: 100 INJECTION INTRAMUSCULAR; INTRAVENOUS at 04:06

## 2024-06-17 RX ADMIN — Medication 1000 MG: at 02:06

## 2024-06-17 NOTE — PATIENT INSTRUCTIONS
Continue zyrtec and flonase daily for sinus.  Albuterol for cough or as needed for wheezing.  Take Z-Ck as prescribed.     What care is needed at home?   Call your regular doctor to let them know you were in the ED. Make a follow-up appointment if you were told to.  Try to thin the mucus.  Drink lots of liquids to stay hydrated.  Use a cool mist humidifier to avoid dry air.  Use saline nose drops or a saline nose rinse to relieve stuffiness.  Wash your hands often. This will help keep others healthy.  Do not smoke or be in smoke-filled places. Avoid things that may cause breathing problems like fumes, pollution, dust, and other common allergens and irritants.  You may want to take medicine like ibuprofen, naproxen, or acetaminophen to help with pain.  When do I need to get emergency help?   Return to the ED if:   You have a stiff neck, especially if you also have fever, chills, vomiting, or severe headache  You have trouble thinking clearly.  You have trouble seeing or have double vision.  You have swelling or redness or pain around one or both eyes.  You have a fever of 102°F (38.9°C) or higher, or have shaking chills or sweats.  When do I need to call the doctor?   You have an upset stomach and throwing up.  You have more pain in your face and head.  You are not getting better within 1 to 2 weeks.  You have new or worsening symptoms.  - Rest.    - Drink plenty of fluids.    - Acetaminophen (tylenol) or Ibuprofen (advil,motrin) as directed as needed for fever/pain. Avoid tylenol if you have a history of liver disease. Do not take ibuprofen if you have a history of GI bleeding, kidney disease, or if you take blood thinners.     - Follow up with your PCP or specialty clinic as directed in the next 1-2 weeks if not improved or as needed.  You can call [phone] to schedule an appointment with the appropriate provider.    - Go to the ER or seek medical attention immediately if you develop new or worsening  symptoms.     - You must understand that you have received an Urgent Care treatment only and that you may be released before all of your medical problems are known or treated.   - You, the patient, will arrange for follow up care as instructed.   - If your condition worsens or fails to improve we recommend that you receive another evaluation at the ER immediately or contact your PCP to discuss your concerns or return here.

## 2024-06-17 NOTE — LETTER
June 17, 2024      [facility] [address] [address]  Phone: [phone]  Fax: [phone]       Patient: Lizet Andrea   YOB: 1970  Date of Visit: 06/17/2024    To Whom It May Concern:    Rohan Andrea  was at [facility] on 06/17/2024. The patient may return to work on 6/19/24 with no restrictions. If you have any questions or concerns, or if I can be of further assistance, please do not hesitate to contact me.    Sincerely,    Jin Pacheco NP

## 2024-07-23 ENCOUNTER — TELEPHONE (OUTPATIENT)
Dept: FAMILY MEDICINE | Facility: CLINIC | Age: 54
End: 2024-07-23
Payer: OTHER GOVERNMENT

## 2024-07-23 NOTE — TELEPHONE ENCOUNTER
----- Message from Ariadna Jones sent at 7/23/2024  8:21 AM CDT -----  Type:  Needs Medical Advice    Who Called:  Emre Reyes   Symptoms (please be specific):  Hoarse voice   How long has patient had these symptoms:      Pharmacy name and phone #:  [id number] [address] AT [address] [facility] [address]   Phone: [phone]  Fax: [phone]  Would the patient rather a call back or a response via [facility]?  call  Best Call Back Number:   [phone]  Additional Information:  Pt wants a call back form RN regarding all of her medications and would also like to be prescribed medication for the hoarseness.

## 2024-07-26 ENCOUNTER — TELEPHONE (OUTPATIENT)
Dept: FAMILY MEDICINE | Facility: CLINIC | Age: 54
End: 2024-07-26
Payer: OTHER GOVERNMENT

## 2024-07-26 NOTE — TELEPHONE ENCOUNTER
Spoke to pt and stated that she has hoarseness and requesting medication. Pt was informed that she would need to be seen. Pt was seen by Urgent Care, but needs additional medication. Pt was scheduled for an appt.

## 2024-07-26 NOTE — TELEPHONE ENCOUNTER
----- Message from Guillermo Godfrey sent at 7/26/2024  2:47 PM CDT -----  Contact: pt  Type:  Patient Returning Call    Who Called:pt   Who Left Message for Patient: office   Does the patient know what this is regarding?: yes  Would the patient rather a call back or a response via [facility]? call  Best Call Back Number:[phone]  Additional Information: mediatation needed for hoariness. Also states she need refills on all her medication. Pt did not clarify which ones.    Pharmacy;  [id number] - [place], [place] - [address] [place] [address] AT SEC OF [address]   Phone: [phone]  Fax: [phone]

## 2024-07-29 ENCOUNTER — OFFICE VISIT (OUTPATIENT)
Dept: INTERNAL MEDICINE | Facility: CLINIC | Age: 54
End: 2024-07-29
Payer: OTHER GOVERNMENT

## 2024-07-29 VITALS
HEART RATE: 88 BPM | DIASTOLIC BLOOD PRESSURE: 80 MMHG | HEIGHT: 70 IN | OXYGEN SATURATION: 99 % | SYSTOLIC BLOOD PRESSURE: 132 MMHG | WEIGHT: 220.44 LBS | BODY MASS INDEX: 31.56 KG/M2 | RESPIRATION RATE: 12 BRPM

## 2024-07-29 DIAGNOSIS — R09.82 POST-NASAL DRIP: Primary | ICD-10-CM

## 2024-07-29 DIAGNOSIS — I25.10 CORONARY ARTERY DISEASE INVOLVING NATIVE CORONARY ARTERY OF NATIVE HEART WITHOUT ANGINA PECTORIS: ICD-10-CM

## 2024-07-29 DIAGNOSIS — L21.9 SEBORRHEIC DERMATITIS: ICD-10-CM

## 2024-07-29 DIAGNOSIS — T50.2X5A DIURETIC-INDUCED HYPOKALEMIA: ICD-10-CM

## 2024-07-29 DIAGNOSIS — R73.03 PREDIABETES: ICD-10-CM

## 2024-07-29 DIAGNOSIS — J30.9 ALLERGIC RHINITIS, UNSPECIFIED SEASONALITY, UNSPECIFIED TRIGGER: ICD-10-CM

## 2024-07-29 DIAGNOSIS — Z95.5 STATUS POST CORONARY ARTERY STENT PLACEMENT: ICD-10-CM

## 2024-07-29 DIAGNOSIS — I25.2 HISTORY OF NON-ST ELEVATION MYOCARDIAL INFARCTION (NSTEMI): ICD-10-CM

## 2024-07-29 DIAGNOSIS — E87.6 DIURETIC-INDUCED HYPOKALEMIA: ICD-10-CM

## 2024-07-29 DIAGNOSIS — I10 ESSENTIAL HYPERTENSION: ICD-10-CM

## 2024-07-29 PROCEDURE — 99214 OFFICE O/P EST MOD 30 MIN: CPT | Mod: S$PBB,,,

## 2024-07-29 PROCEDURE — 99215 OFFICE O/P EST HI 40 MIN: CPT | Mod: PBBFAC,PO

## 2024-07-29 PROCEDURE — 99999 PR PBB SHADOW E&M-EST. PATIENT-LVL V: CPT | Mod: PBBFAC,,,

## 2024-07-29 RX ORDER — LEVOCETIRIZINE DIHYDROCHLORIDE 5 MG/1
5 TABLET, FILM COATED ORAL NIGHTLY
Qty: 30 TABLET | Refills: 11 | Status: SHIPPED | OUTPATIENT
Start: 2024-07-29 | End: 2025-07-29

## 2024-07-29 RX ORDER — FLUTICASONE PROPIONATE 50 MCG
1 SPRAY, SUSPENSION (ML) NASAL DAILY
Qty: 16 G | Refills: 0 | Status: SHIPPED | OUTPATIENT
Start: 2024-07-29

## 2024-07-29 NOTE — PROGRESS NOTES
Subjective:       Patient ID: Lizet Andrea is a 54 y.o. female.    Chief Complaint: Recurrent Sinusitis (Laryngitis, tickle to throat/)    55 yo female presents for further evaluation of feeling hoarse and throat irritation. Endorses that she was treated in June for bronchitis. She received Z-lisa, albuterol inhaler, home nebs, and steroid injection with improvement in her symptoms. Since then she has been feeling much better but continues to have hoarseness. She denies fever, cough, fatigue, shortness of breath, chest pain, or dawn nose. She does experience PND during the day and at nighttime. Also endorses occasional hiccups. She was taking something for reflux about three years ago but stopped.     Review of Systems   Constitutional:  Negative for chills, diaphoresis, fatigue, fever, night sweats and unexpected weight change.   HENT:  Positive for postnasal drip and voice change. Negative for nasal congestion, rhinorrhea, sinus pressure/congestion, sore throat and trouble swallowing.    Respiratory:  Negative for cough, chest tightness, shortness of breath and wheezing.    Cardiovascular:  Negative for chest pain, palpitations and leg swelling.   Gastrointestinal:  Positive for reflux. Negative for abdominal pain, constipation, diarrhea, nausea and vomiting.   Musculoskeletal:  Negative for arthralgias and myalgias.   Neurological:  Negative for dizziness, weakness, light-headedness and headaches.         Objective:      Physical Exam  Constitutional:       General: She is awake. She is not in acute distress.     Appearance: Normal appearance. She is well-developed and well-groomed. She is obese. She is not ill-appearing, toxic-appearing or diaphoretic.   HENT:      Head: Normocephalic and atraumatic.      Right Ear: Tympanic membrane, ear canal and external ear normal.      Left Ear: Tympanic membrane, ear canal and external ear normal.      Nose:      Right Turbinates: Swollen and pale.      Left  Turbinates: Swollen and pale.      Right Sinus: No maxillary sinus tenderness or frontal sinus tenderness.      Left Sinus: No maxillary sinus tenderness or frontal sinus tenderness.      Mouth/Throat:      Mouth: Mucous membranes are moist.      Pharynx: Oropharynx is clear.   Eyes:      General: No scleral icterus.     Conjunctiva/sclera: Conjunctivae normal.      Pupils: Pupils are equal, round, and reactive to light.   Cardiovascular:      Rate and Rhythm: Normal rate and regular rhythm.      Pulses: Normal pulses.      Heart sounds: Normal heart sounds. No murmur heard.     No friction rub. No gallop.   Pulmonary:      Effort: Pulmonary effort is normal. No respiratory distress.      Breath sounds: Normal breath sounds. No wheezing or rales.   Abdominal:      General: Bowel sounds are normal. There is no distension.      Palpations: Abdomen is soft. There is no mass.      Tenderness: There is no abdominal tenderness. There is no guarding.   Musculoskeletal:         General: Normal range of motion.      Cervical back: Normal range of motion and neck supple.   Skin:     General: Skin is warm and dry.      Capillary Refill: Capillary refill takes less than 2 seconds.   Neurological:      Mental Status: She is alert and oriented to person, place, and time. Mental status is at baseline.      GCS: GCS eye subscore is 4. GCS verbal subscore is 5. GCS motor subscore is 6.   Psychiatric:         Behavior: Behavior normal. Behavior is cooperative.         Assessment:       1. Post-nasal drip  - levocetirizine (XYZAL) 5 MG tablet; Take 1 tablet (5 mg total) by mouth every evening.  Dispense: 30 tablet; Refill: 11  - fluticasone propionate (FLONASE) 50 mcg/actuation nasal spray; 1 spray (50 mcg total) by Each Nostril route once daily.  Dispense: 16 g; Refill: 0    2. Allergic rhinitis, unspecified seasonality, unspecified trigger  - levocetirizine (XYZAL) 5 MG tablet; Take 1 tablet (5 mg total) by mouth every evening.   Dispense: 30 tablet; Refill: 11  - fluticasone propionate (FLONASE) 50 mcg/actuation nasal spray; 1 spray (50 mcg total) by Each Nostril route once daily.  Dispense: 16 g; Refill: 0    3. Essential hypertension    4. Coronary artery disease involving native coronary artery of native heart without angina pectoris    5. Status post coronary artery stent placement    6. History of non-ST elevation myocardial infarction (NSTEMI)    7. Prediabetes    8. Diuretic-induced hypokalemia      Plan:   1/2.  Take Xyzal 5 mg tablet daily.  Use Flonase 1 spray to each nostril 1 time daily.  Avoid any known allergens or irritants.  Stay well hydrated.  3/4/5/6.  Chronic stable.  Continue amlodipine 10 mg daily, aspirin 81 mg daily, carvedilol 25 mg daily, chlorthalidone 25 mg daily, and losartan 100 mg daily.  Continue atorvastatin 80 mg daily.  Monitor.  7. Chronic, stable.  Continue metformin 500 mg daily.  Monitor.  8. Chronic, stable.  Continue potassium chloride 20 mEq daily.  Monitor.      Follow up as needed       Giuseppe Mack,MSN, APRN, FNP-C  [facility], Internal Medicine  3:20 PM

## 2024-07-29 NOTE — LETTER
July 29, 2024      [facility] Internal Medicine  [address].  [place] [address]  Phone: [phone]  Fax: [phone]       Patient: Lizet Andrea   YOB: 1970  Date of Visit: 07/29/2024    To Whom It May Concern:    Rohan Andrea  was at [facility] on 07/29/2024. The patient may return to work  on 07/29/2024 with no restrictions. If you have any questions or concerns, or if I can be of further assistance, please do not hesitate to contact me.    Sincerely,    SHARON Michelle

## 2024-08-01 RX ORDER — KETOCONAZOLE 20 MG/G
CREAM TOPICAL 2 TIMES DAILY
Qty: 45 G | Refills: 2 | Status: SHIPPED | OUTPATIENT
Start: 2024-08-01

## 2024-08-05 DIAGNOSIS — I25.10 CORONARY ARTERY DISEASE INVOLVING NATIVE CORONARY ARTERY OF NATIVE HEART WITHOUT ANGINA PECTORIS: Primary | ICD-10-CM

## 2024-08-05 DIAGNOSIS — R07.89 OTHER CHEST PAIN: ICD-10-CM

## 2024-08-13 ENCOUNTER — HOSPITAL ENCOUNTER (OUTPATIENT)
Dept: CARDIOLOGY | Facility: HOSPITAL | Age: 54
Discharge: HOME | End: 2024-08-13
Attending: INTERNAL MEDICINE
Payer: OTHER GOVERNMENT

## 2024-08-13 VITALS — HEIGHT: 70 IN | WEIGHT: 220 LBS | BODY MASS INDEX: 31.5 KG/M2

## 2024-08-13 DIAGNOSIS — R07.89 OTHER CHEST PAIN: ICD-10-CM

## 2024-08-13 DIAGNOSIS — I25.10 CORONARY ARTERY DISEASE INVOLVING NATIVE CORONARY ARTERY OF NATIVE HEART WITHOUT ANGINA PECTORIS: ICD-10-CM

## 2024-08-13 LAB
APICAL FOUR CHAMBER EJECTION FRACTION: 59 %
APICAL TWO CHAMBER EJECTION FRACTION: 66 %
ASCENDING AORTA: 2.77 CM
AV INDEX (PROSTH): 0.8
AV MEAN GRADIENT: 3 MMHG
AV PEAK GRADIENT: 4 MMHG
AV VALVE AREA BY VELOCITY RATIO: 2.81 CM²
AV VALVE AREA: 2.65 CM²
AV VELOCITY RATIO: 0.85
BSA FOR ECHO PROCEDURE: 2.22 M2
CV ECHO LV RWT: 0.4 CM
DOP CALC AO PEAK VEL: 1.02 M/S
DOP CALC AO VTI: 21.4 CM
DOP CALC LVOT AREA: 3.3 CM2
DOP CALC LVOT DIAMETER: 2.05 CM
DOP CALC LVOT PEAK VEL: 0.87 M/S
DOP CALC LVOT STROKE VOLUME: 56.74 CM3
DOP CALC MV VTI: 16.1 CM
DOP CALCLVOT PEAK VEL VTI: 17.2 CM
E WAVE DECELERATION TIME: 140.59 MSEC
E/A RATIO: 1.13
E/E' RATIO: 6.48 M/S
ECHO LV POSTERIOR WALL: 0.91 CM (ref 0.6–1.1)
FRACTIONAL SHORTENING: 36 % (ref 28–44)
INTERVENTRICULAR SEPTUM: 0.85 CM (ref 0.6–1.1)
IVC DIAMETER: 1.29 CM
LA MAJOR: 4.94 CM
LA MINOR: 5.07 CM
LA WIDTH: 3.8 CM
LEFT ATRIUM AREA SYSTOLIC (APICAL 2 CHAMBER): 15.7 CM2
LEFT ATRIUM AREA SYSTOLIC (APICAL 4 CHAMBER): 17.11 CM2
LEFT ATRIUM SIZE: 2.75 CM
LEFT ATRIUM VOLUME INDEX MOD: 20.3 ML/M2
LEFT ATRIUM VOLUME INDEX: 20.5 ML/M2
LEFT ATRIUM VOLUME MOD: 43.95 CM3
LEFT ATRIUM VOLUME: 44.45 CM3
LEFT INTERNAL DIMENSION IN SYSTOLE: 2.89 CM (ref 2.1–4)
LEFT VENTRICLE DIASTOLIC VOLUME INDEX: 42.78 ML/M2
LEFT VENTRICLE DIASTOLIC VOLUME: 92.84 ML
LEFT VENTRICLE END DIASTOLIC VOLUME APICAL 2 CHAMBER: 63.45 ML
LEFT VENTRICLE END DIASTOLIC VOLUME APICAL 4 CHAMBER: 61.68 ML
LEFT VENTRICLE END SYSTOLIC VOLUME APICAL 2 CHAMBER: 38.64 ML
LEFT VENTRICLE END SYSTOLIC VOLUME APICAL 4 CHAMBER: 48.16 ML
LEFT VENTRICLE MASS INDEX: 60 G/M2
LEFT VENTRICLE SYSTOLIC VOLUME INDEX: 14.7 ML/M2
LEFT VENTRICLE SYSTOLIC VOLUME: 31.88 ML
LEFT VENTRICULAR INTERNAL DIMENSION IN DIASTOLE: 4.51 CM (ref 3.5–6)
LEFT VENTRICULAR MASS: 129.36 G
LV LATERAL E/E' RATIO: 6.8 M/S
LV SEPTAL E/E' RATIO: 6.18 M/S
LVED V (TEICH): 92.84 ML
LVES V (TEICH): 31.88 ML
LVOT MG: 1.87 MMHG
LVOT MV: 0.67 CM/S
MV PEAK A VEL: 0.6 M/S
MV PEAK E VEL: 0.68 M/S
MV PEAK GRADIENT: 2 MMHG
MV STENOSIS PRESSURE HALF TIME: 40.77 MS
MV VALVE AREA BY CONTINUITY EQUATION: 3.52 CM2
MV VALVE AREA P 1/2 METHOD: 5.4 CM2
OHS LV EJECTION FRACTION SIMPSONS BIPLANE MOD: 61 %
PISA MRMAX VEL: 1.79 M/S
PISA TR MAX VEL: 1.71 M/S
PULM VEIN S/D RATIO: 1.03
PV PEAK D VEL: 0.29 M/S
PV PEAK GRADIENT: 3 MMHG
PV PEAK S VEL: 0.3 M/S
PV PEAK VELOCITY: 0.83 M/S
RA MAJOR: 5.16 CM
RA PRESSURE ESTIMATED: 3 MMHG
RA WIDTH: 3.17 CM
RV TB RVSP: 5 MMHG
RV TISSUE DOPPLER FREE WALL SYSTOLIC VELOCITY 1 (APICAL 4 CHAMBER VIEW): 12.28 CM/S
SINUS: 2.8 CM
STJ: 2.36 CM
TDI LATERAL: 0.1 M/S
TDI SEPTAL: 0.11 M/S
TDI: 0.11 M/S
TR MAX PG: 12 MMHG
TRICUSPID ANNULAR PLANE SYSTOLIC EXCURSION: 2.42 CM
TV REST PULMONARY ARTERY PRESSURE: 15 MMHG
Z-SCORE OF LEFT VENTRICULAR DIMENSION IN END DIASTOLE: -4.65
Z-SCORE OF LEFT VENTRICULAR DIMENSION IN END SYSTOLE: -3.27

## 2024-08-13 PROCEDURE — 93306 TTE W/DOPPLER COMPLETE: CPT

## 2024-08-13 PROCEDURE — 93306 TTE W/DOPPLER COMPLETE: CPT | Mod: 26,,, | Performed by: INTERNAL MEDICINE

## 2024-08-13 NOTE — PROGRESS NOTES
Your echocardiogram results is okay.  Normal heart pumping function.  Overall no major abnormalities.  We will discuss in detail during next appointment    Sincerely,  Lasha Cox MD.   Interventional Cardiologist  [facility] [place]

## 2024-08-15 ENCOUNTER — OFFICE VISIT (OUTPATIENT)
Dept: CARDIOLOGY | Facility: CLINIC | Age: 54
End: 2024-08-15
Payer: OTHER GOVERNMENT

## 2024-08-15 VITALS
DIASTOLIC BLOOD PRESSURE: 93 MMHG | WEIGHT: 216 LBS | SYSTOLIC BLOOD PRESSURE: 137 MMHG | BODY MASS INDEX: 30.92 KG/M2 | HEIGHT: 70 IN | HEART RATE: 106 BPM | OXYGEN SATURATION: 96 %

## 2024-08-15 DIAGNOSIS — I10 ACCELERATED HYPERTENSION: ICD-10-CM

## 2024-08-15 DIAGNOSIS — Z79.82 LONG-TERM USE OF ASPIRIN THERAPY: ICD-10-CM

## 2024-08-15 DIAGNOSIS — Z95.5 STATUS POST CORONARY ARTERY STENT PLACEMENT: ICD-10-CM

## 2024-08-15 DIAGNOSIS — I25.118 CORONARY ARTERY DISEASE OF NATIVE ARTERY OF NATIVE HEART WITH STABLE ANGINA PECTORIS: Chronic | ICD-10-CM

## 2024-08-15 DIAGNOSIS — I25.2 HISTORY OF NON-ST ELEVATION MYOCARDIAL INFARCTION (NSTEMI): Primary | ICD-10-CM

## 2024-08-15 DIAGNOSIS — I10 ESSENTIAL HYPERTENSION: ICD-10-CM

## 2024-08-15 PROCEDURE — 99214 OFFICE O/P EST MOD 30 MIN: CPT | Mod: PBBFAC,PN | Performed by: INTERNAL MEDICINE

## 2024-08-15 PROCEDURE — 99999 PR PBB SHADOW E&M-EST. PATIENT-LVL IV: CPT | Mod: PBBFAC,,, | Performed by: INTERNAL MEDICINE

## 2024-08-15 PROCEDURE — 99214 OFFICE O/P EST MOD 30 MIN: CPT | Mod: S$PBB,,, | Performed by: INTERNAL MEDICINE

## 2024-08-15 RX ORDER — AMLODIPINE BESYLATE 10 MG/1
10 TABLET ORAL DAILY
Qty: 90 TABLET | Refills: 4 | Status: SHIPPED | OUTPATIENT
Start: 2024-08-15

## 2024-08-15 RX ORDER — LOSARTAN POTASSIUM 100 MG/1
100 TABLET ORAL DAILY
Qty: 90 TABLET | Refills: 4 | Status: SHIPPED | OUTPATIENT
Start: 2024-08-15 | End: 2024-08-15

## 2024-08-15 RX ORDER — LOSARTAN POTASSIUM AND HYDROCHLOROTHIAZIDE 25; 100 MG/1; MG/1
1 TABLET ORAL DAILY
Qty: 90 TABLET | Refills: 3 | Status: SHIPPED | OUTPATIENT
Start: 2024-08-15 | End: 2025-08-15

## 2024-08-15 RX ORDER — CARVEDILOL 25 MG/1
TABLET ORAL
Qty: 180 TABLET | Refills: 4 | Status: SHIPPED | OUTPATIENT
Start: 2024-08-15

## 2024-08-15 RX ORDER — CHLORTHALIDONE 25 MG/1
25 TABLET ORAL DAILY
Qty: 90 TABLET | Refills: 4 | Status: SHIPPED | OUTPATIENT
Start: 2024-08-15 | End: 2024-08-15

## 2024-08-15 NOTE — PROGRESS NOTES
Subjective:   @Patient ID:  Lizet Andrea is a 54 y.o. female who presents for follow-up of CAD      HPI:   August 2024: f/u.  He has been doing well.  BP toward the higher side.  She is taking Coreg just once a day.  She would like to cut her pill numbers. No cp, or significant dyspnea on exertion      10/2023: f/u. She is doing well. No chest pain. No significant SANTO. She is compliant with her medication. LDL at goal. BP well controlled. Working on wt loss    1/2023: Patient is here for follow up. She is doing well today.  No recurrent chest pains.  She did not have to take any nitroglycerin. Blood pressure is well controlled. She does have some vague right upper quadrant pain completely different than her prior cardiac chest pain      She is compliant with her medication    Stress MPI earlier this year was negative for ischemia        Prior cardiovascular  Hx  --------------------------------  Stress MPI 2/11/2022  1. Scintigraphically negative for ischemia or infarct.  2. The global left ventricular systolic function is normal with an LV ejection fraction of greater than 75 % and no evidence of LV dilatation. Wall motion is normal.    CAD s/p PCI LCX to NSTEMI in 2015 with 3.5 x 15 resolute ALDA, [facility] in 2016 showed patent stent    EKG December 15, 2022 sinus rhythm diffuse nonspecific T-wave abnormalities no acute changes as compared to prior.  - ECHO 1/2015 EF 60-65%    Patient Active Problem List    Diagnosis Date Noted    Lateral pain of hip 01/25/2022    Decreased strength of lower extremity 01/25/2022    Acute left-sided low back pain with left-sided sciatica 01/24/2022    Ulnar nerve palsy of right upper extremity 09/22/2020    Vitamin D deficiency 03/02/2020    Essential hypertension 10/16/2019    Cubital tunnel syndrome on left 01/14/2019    Prediabetes 03/14/2018    Long-term use of aspirin therapy 10/25/2017    Diuretic-induced hypokalemia 07/02/2016    History of non-ST elevation myocardial  infarction (NSTEMI) 07/02/2016    Coronary artery disease of native artery of native heart with stable angina pectoris 07/02/2016    Hepatitis C antibody test positive 05/04/2016     Negative HCV RNA 05/2016, will check again in 3 months but no detectable HCV virus  HCV AB will likely remain positive for life  HCV RNA again undetectable 10/2/17      Plantar fasciitis 10/22/2015     stretching exercises demonstrated, recommended foot wear fitting-- good arch support      Status post coronary artery stent placement 01/19/2015     LCx stent [id number]      Allergic rhinitis 01/03/2015    Accelerated hypertension 01/01/2015    Uterine fibroid 09/09/2013                    LAST HbA1c  Lab Results   Component Value Date    HGBA1C 5.4 06/10/2024       Lipid panel  Lab Results   Component Value Date    CHOL 131 06/10/2024    CHOL 132 01/24/2024    CHOL 128 08/24/2023     Lab Results   Component Value Date    HDL 51 06/10/2024    HDL 48 01/24/2024    HDL 48 08/24/2023     Lab Results   Component Value Date    LDLCALC 72.8 06/10/2024    LDLCALC 75.0 01/24/2024    LDLCALC 68.2 08/24/2023     Lab Results   Component Value Date    TRIG 36 06/10/2024    TRIG 45 01/24/2024    TRIG 59 08/24/2023     Lab Results   Component Value Date    CHOLHDL 38.9 06/10/2024    CHOLHDL 36.4 01/24/2024    CHOLHDL 37.5 08/24/2023            Review of Systems   Constitutional: Negative for chills and fever.   HENT:  Negative for hearing loss and nosebleeds.    Eyes:  Negative for blurred vision.   Cardiovascular:  Negative for chest pain and palpitations.   Respiratory:  Negative for cough, hemoptysis and shortness of breath.    Hematologic/Lymphatic: Negative for bleeding problem.   Skin:  Negative for itching.   Musculoskeletal:  Negative for falls.   Gastrointestinal:  Negative for abdominal pain and hematochezia.   Genitourinary:  Negative for hematuria.   Neurological:  Negative for dizziness and loss of balance.   Psychiatric/Behavioral:   Negative for altered mental status and depression.        Objective:   Physical Exam  Constitutional:       Appearance: She is well-developed.   HENT:      Head: Normocephalic and atraumatic.   Eyes:      Conjunctiva/sclera: Conjunctivae normal.   Neck:      Vascular: No carotid bruit.   Cardiovascular:      Rate and Rhythm: Normal rate and regular rhythm.      Heart sounds: Normal heart sounds. No murmur heard.     No friction rub. No gallop.   Pulmonary:      Effort: Pulmonary effort is normal. No respiratory distress.      Breath sounds: Normal breath sounds. No stridor. No wheezing.   Musculoskeletal:      Cervical back: Neck supple.   Skin:     General: Skin is warm and dry.   Neurological:      Mental Status: She is alert and oriented to person, place, and time.   Psychiatric:         Behavior: Behavior normal.         Assessment:     1. History of non-ST elevation myocardial infarction (NSTEMI)    2. Essential hypertension    3. Coronary artery disease of native artery of native heart with stable angina pectoris    4. Status post coronary artery stent placement          Plan:   - Continue OMT with ASA and Statin  - stable coronary artery disease  -  nitroglycerin p.r.n..    - Target LDL < 70 ,  Continue Zetia and Lipitor 80 mg qhs. LDL at goal.     - WT loss and life style modification discussed with the patient.     Increase Coreg to b.i.d.  Switch to losartan and chlorthalidone to losartan/HCTZ to increase compliance  Four weeks follow up with blood pressure log       I spent 5-10 minutes asking, assessing, assisting, arranging and advising heart healthy diet improvements. This included low-salt meals, portion control and health food alternatives. I also encourage 30 minutes of moderate exercise 3-4x a week.         EKG reviewed no acute ST changes.    Continue with current medical plan and lifestyle changes.  Return sooner for concerns or questions. If symptoms persist go to the ED  I have reviewed all  pertinent data including patient's medical history in detail and updated the computerized patient record.     No orders of the defined types were placed in this encounter.      Follow up as scheduled.     She expressed verbal understanding and agreed with the plan    Patient's Medications   New Prescriptions    No medications on file   Previous Medications    ALBUTEROL (VENTOLIN HFA) 90 MCG/ACTUATION INHALER    Inhale 2 puffs into the lungs every 6 (six) hours as needed for Wheezing. Rescue    AMLODIPINE (NORVASC) 10 MG TABLET    Take 1 tablet (10 mg total) by mouth once daily.    ASPIRIN 81 MG CHEW    Take 81 mg by mouth once daily.    ATORVASTATIN (LIPITOR) 80 MG TABLET    Take 1 tablet (80 mg total) by mouth once daily.    BENZONATATE (TESSALON) 100 MG CAPSULE    Take 200 mg by mouth every 8 (eight) hours as needed.    CARVEDILOL (COREG) 25 MG TABLET    TAKE 1 TABLET BY MOUTH TWICE DAILY WITH MEAL    CETIRIZINE (ZYRTEC) 10 MG TABLET    Take 1 tablet (10 mg total) by mouth once daily.    CHLORTHALIDONE (HYGROTEN) 25 MG TAB    Take 1 tablet (25 mg total) by mouth once daily.    CHOLECALCIFEROL, VITAMIN D3, 125 MCG (5,000 UNIT) CAPSULE    Take 1 capsule (5,000 Units total) by mouth daily with breakfast.    CLINDAMYCIN (CLEOCIN T) 1 % LOTION        EZETIMIBE (ZETIA) 10 MG TABLET    Take 1 tablet (10 mg total) by mouth once daily.    FLUTICASONE PROPIONATE (FLONASE) 50 MCG/ACTUATION NASAL SPRAY    1 spray (50 mcg total) by Each Nostril route once daily.    FLUTICASONE PROPIONATE (FLONASE) 50 MCG/ACTUATION NASAL SPRAY    1 spray (50 mcg total) by Each Nostril route once daily.    KETOCONAZOLE (NIZORAL) 2 % CREAM    Apply topically 2 (two) times daily. Prn facial rash or dryness    LEVOCETIRIZINE (XYZAL) 5 MG TABLET    Take 1 tablet (5 mg total) by mouth every evening.    LOSARTAN (COZAAR) 100 MG TABLET    Take 1 tablet (100 mg total) by mouth once daily.    METFORMIN (GLUCOPHAGE-XR) 500 MG ER 24HR TABLET    Take 1  tablet (500 mg total) by mouth daily with dinner or evening meal.    NITROGLYCERIN (NITROSTAT) 0.4 MG SL TABLET    Place 1 tablet (0.4 mg total) under the tongue every 5 (five) minutes as needed for Chest pain.    POTASSIUM CHLORIDE (K-TAB) 20 MEQ    Take 1 tablet (20 mEq total) by mouth once daily.    TRIAMCINOLONE ACETONIDE TOP       Modified Medications    No medications on file   Discontinued Medications    No medications on file

## 2024-08-19 ENCOUNTER — TELEPHONE (OUTPATIENT)
Dept: FAMILY MEDICINE | Facility: CLINIC | Age: 54
End: 2024-08-19
Payer: OTHER GOVERNMENT

## 2024-08-19 DIAGNOSIS — E66.09 CLASS 1 OBESITY DUE TO EXCESS CALORIES WITH SERIOUS COMORBIDITY IN ADULT, UNSPECIFIED BMI: Primary | ICD-10-CM

## 2024-08-19 RX ORDER — SEMAGLUTIDE 0.5 MG/.5ML
0.5 INJECTION, SOLUTION SUBCUTANEOUS
Qty: 6 ML | Refills: 1 | Status: SHIPPED | OUTPATIENT
Start: 2024-08-19

## 2024-08-19 NOTE — TELEPHONE ENCOUNTER
----- Message from Dayana Ocampoaneda sent at 8/16/2024 12:32 PM CDT -----  Type:  RX Refill Request    Who Called:  Pt   Refill or New Rx:  New   RX Name and Strength:wegovy  Preferred Pharmacy with phone number:[id number] [place] [address] [address] [facility] [address]  Local or Mail Order: Local   Ordering Provider: Missy   Would the patient rather a call back or a response via [facility]? Call   Best Call Back Number: [phone]   Additional Information:

## 2024-09-10 NOTE — PROGRESS NOTES
Subjective:   @Patient ID:  Lizet Andrea is a 54 y.o. female who presents for follow-up of CAD      HPI:     9/12/2024: Here for F/U after medication changes for HTN. Previous patient of Dr. Cox as below, initial visit for me. Seen in clinic unaccompanied, reports overall doing well. Doing well with weight loss. Did not bring BP logs. Confusion about medication regimen, has not started Hyzaar (still on chlorthalidone and losartan) and has stopped Coreg. AVS reviewed with patient personally, to restart Coreg BID, stop losartan and chlorthalidone, and start Hyzaar. Will F/U close via virtual visit to review medications and logs. Patient denies CP, SOB/SANTO, orthopnea, PND, syncope, palpitations, LE edema.     August 2024: f/u.  He has been doing well.  BP toward the higher side.  She is taking Coreg just once a day.  She would like to cut her pill numbers. No cp, or significant dyspnea on exertion    10/2023: f/u. She is doing well. No chest pain. No significant SANTO. She is compliant with her medication. LDL at goal. BP well controlled. Working on wt loss    1/2023: Patient is here for follow up. She is doing well today.  No recurrent chest pains.  She did not have to take any nitroglycerin. Blood pressure is well controlled. She does have some vague right upper quadrant pain completely different than her prior cardiac chest pain    She is compliant with her medication    Stress MPI earlier this year was negative for ischemia        Prior cardiovascular  Hx  --------------------------------  Stress MPI 2/11/2022  1. Scintigraphically negative for ischemia or infarct.  2. The global left ventricular systolic function is normal with an LV ejection fraction of greater than 75 % and no evidence of LV dilatation. Wall motion is normal.    CAD s/p PCI LCX to NSTEMI in 2015 with 3.5 x 15 resolute ALDA, [facility] in 2016 showed patent stent    EKG December 15, 2022 sinus rhythm diffuse nonspecific T-wave abnormalities no  acute changes as compared to prior.  - ECHO 1/2015 EF 60-65%    Patient Active Problem List    Diagnosis Date Noted    Hyperlipidemia LDL goal <70 09/12/2024    Lateral pain of hip 01/25/2022    Decreased strength of lower extremity 01/25/2022    Acute left-sided low back pain with left-sided sciatica 01/24/2022    Ulnar nerve palsy of right upper extremity 09/22/2020    Vitamin D deficiency 03/02/2020    Essential hypertension 10/16/2019    Cubital tunnel syndrome on left 01/14/2019    Prediabetes 03/14/2018    Long-term use of aspirin therapy 10/25/2017    Diuretic-induced hypokalemia 07/02/2016    History of non-ST elevation myocardial infarction (NSTEMI) 07/02/2016    Coronary artery disease of native artery of native heart with stable angina pectoris 07/02/2016    Hepatitis C antibody test positive 05/04/2016     Negative HCV RNA 05/2016, will check again in 3 months but no detectable HCV virus  HCV AB will likely remain positive for life  HCV RNA again undetectable 10/2/17      Plantar fasciitis 10/22/2015     stretching exercises demonstrated, recommended foot wear fitting-- good arch support      Status post coronary artery stent placement 01/19/2015     LCx stent 1/56331      Allergic rhinitis 01/03/2015    Accelerated hypertension 01/01/2015    Uterine fibroid 09/09/2013                    LAST HbA1c  Lab Results   Component Value Date    HGBA1C 5.4 06/10/2024       Lipid panel  Lab Results   Component Value Date    CHOL 131 06/10/2024    CHOL 132 01/24/2024    CHOL 128 08/24/2023     Lab Results   Component Value Date    HDL 51 06/10/2024    HDL 48 01/24/2024    HDL 48 08/24/2023     Lab Results   Component Value Date    LDLCALC 72.8 06/10/2024    LDLCALC 75.0 01/24/2024    LDLCALC 68.2 08/24/2023     Lab Results   Component Value Date    TRIG 36 06/10/2024    TRIG 45 01/24/2024    TRIG 59 08/24/2023     Lab Results   Component Value Date    CHOLHDL 38.9 06/10/2024    CHOLHDL 36.4 01/24/2024    CHOLHDL  37.5 08/24/2023            Review of Systems   Constitutional: Negative for chills and fever.   HENT:  Negative for hearing loss and nosebleeds.    Eyes:  Negative for blurred vision.   Cardiovascular:  Negative for chest pain and palpitations.   Respiratory:  Negative for cough, hemoptysis and shortness of breath.    Hematologic/Lymphatic: Negative for bleeding problem.   Skin:  Negative for itching.   Musculoskeletal:  Negative for falls.   Gastrointestinal:  Negative for abdominal pain and hematochezia.   Genitourinary:  Negative for hematuria.   Neurological:  Negative for dizziness and loss of balance.   Psychiatric/Behavioral:  Negative for altered mental status and depression.        Objective:   Physical Exam  Constitutional:       Appearance: She is well-developed.   HENT:      Head: Normocephalic and atraumatic.   Eyes:      Conjunctiva/sclera: Conjunctivae normal.   Neck:      Vascular: No carotid bruit.   Cardiovascular:      Rate and Rhythm: Normal rate and regular rhythm.      Heart sounds: Normal heart sounds. No murmur heard.     No friction rub. No gallop.   Pulmonary:      Effort: Pulmonary effort is normal. No respiratory distress.      Breath sounds: Normal breath sounds. No stridor. No wheezing.   Musculoskeletal:      Cervical back: Neck supple.   Skin:     General: Skin is warm and dry.   Neurological:      Mental Status: She is alert and oriented to person, place, and time.   Psychiatric:         Behavior: Behavior normal.         Assessment:     1. Coronary artery disease of native artery of native heart with stable angina pectoris    2. Essential hypertension    3. History of non-ST elevation myocardial infarction (NSTEMI)    4. Status post coronary artery stent placement    5. Hyperlipidemia LDL goal <70    6. Accelerated hypertension            Plan:     CAD, NSTEMI  -PCI LCX to NSTEMI in 2015 with 3.5 x 15 resolute ALDA, [facility] in 2016 showed patent stent  -stable, no new CV s/s  -continue  asa, statin, zetia  -NTG PRN  -tight lipid control    2. HTN  -goal BP< 130/80  -confusion about medication changes, AVS reviewed with patient personally  -continue Hyzaar, BB  -monitor e- closely, continue potassium 20 daily  -virtual visit 2 weeks for medication review, BP logs    3. HLD, obesity with comorbidity  -goal LDLc < 70, close to goal  -low sodium, heart healthy diet; mod intensity exercise 30m/day 3-4x/wk  -continue statin, zetia  -weight loss, exercise as above  -sleep med referral for ROGE eval        - WT loss and life style modification discussed with the patient.   - F/U 2 week virtual with BP logs         I spent 5-10 minutes asking, assessing, assisting, arranging and advising heart healthy diet improvements. This included low-salt meals, portion control and health food alternatives. I also encourage 30 minutes of moderate exercise 3-4x a week.       EKG reviewed no acute ST changes.    Continue with current medical plan and lifestyle changes.  Return sooner for concerns or questions. If symptoms persist go to the ED  I have reviewed all pertinent data including patient's medical history in detail and updated the computerized patient record.     Orders Placed This Encounter   Procedures    Ambulatory referral/consult to Sleep Disorders     Standing Status:   Future     Standing Expiration Date:   10/12/2025     Referral Priority:   Routine     Referral Type:   Consultation     Requested Specialty:   Sleep Medicine     Number of Visits Requested:   1       Follow up as scheduled.     She expressed verbal understanding and agreed with the plan    Patient's Medications   New Prescriptions    No medications on file   Previous Medications    AMLODIPINE (NORVASC) 10 MG TABLET    Take 1 tablet (10 mg total) by mouth once daily.    ASPIRIN 81 MG CHEW    Take 81 mg by mouth once daily.    ATORVASTATIN (LIPITOR) 80 MG TABLET    Take 1 tablet (80 mg total) by mouth once daily.    BENZONATATE (TESSALON) 100  MG CAPSULE    Take 200 mg by mouth every 8 (eight) hours as needed.    CETIRIZINE (ZYRTEC) 10 MG TABLET    Take 1 tablet (10 mg total) by mouth once daily.    CHOLECALCIFEROL, VITAMIN D3, 125 MCG (5,000 UNIT) CAPSULE    Take 1 capsule (5,000 Units total) by mouth daily with breakfast.    CLINDAMYCIN (CLEOCIN T) 1 % LOTION        EZETIMIBE (ZETIA) 10 MG TABLET    Take 1 tablet (10 mg total) by mouth once daily.    FLUTICASONE PROPIONATE (FLONASE) 50 MCG/ACTUATION NASAL SPRAY    1 spray (50 mcg total) by Each Nostril route once daily.    FLUTICASONE PROPIONATE (FLONASE) 50 MCG/ACTUATION NASAL SPRAY    1 spray (50 mcg total) by Each Nostril route once daily.    KETOCONAZOLE (NIZORAL) 2 % CREAM    Apply topically 2 (two) times daily. Prn facial rash or dryness    LEVOCETIRIZINE (XYZAL) 5 MG TABLET    Take 1 tablet (5 mg total) by mouth every evening.    METFORMIN (GLUCOPHAGE-XR) 500 MG ER 24HR TABLET    Take 1 tablet (500 mg total) by mouth daily with dinner or evening meal.    NITROGLYCERIN (NITROSTAT) 0.4 MG SL TABLET    Place 1 tablet (0.4 mg total) under the tongue every 5 (five) minutes as needed for Chest pain.    POTASSIUM CHLORIDE (K-TAB) 20 MEQ    Take 1 tablet (20 mEq total) by mouth once daily.    SEMAGLUTIDE, WEIGHT LOSS, (WEGOVY) 0.5 MG/0.5 ML PNIJ    Inject 0.5 mg into the skin every 7 days.    TRIAMCINOLONE ACETONIDE TOP       Modified Medications    Modified Medication Previous Medication    CARVEDILOL (COREG) 25 MG TABLET carvediloL (COREG) 25 MG tablet       TAKE 1 TABLET BY MOUTH TWICE DAILY WITH MEAL    TAKE 1 TABLET BY MOUTH TWICE DAILY WITH MEAL    LOSARTAN-HYDROCHLOROTHIAZIDE 100-25 MG (HYZAAR) 100-25 MG PER TABLET losartan-hydrochlorothiazide 100-25 mg (HYZAAR) 100-25 mg per tablet       Take 1 tablet by mouth once daily.    Take 1 tablet by mouth once daily.   Discontinued Medications    ALBUTEROL (VENTOLIN HFA) 90 MCG/ACTUATION INHALER    Inhale 2 puffs into the lungs every 6 (six) hours as  needed for Wheezing. Rescue

## 2024-09-12 ENCOUNTER — OFFICE VISIT (OUTPATIENT)
Dept: CARDIOLOGY | Facility: CLINIC | Age: 54
End: 2024-09-12
Payer: OTHER GOVERNMENT

## 2024-09-12 VITALS
HEART RATE: 97 BPM | WEIGHT: 214.5 LBS | HEIGHT: 70 IN | SYSTOLIC BLOOD PRESSURE: 138 MMHG | OXYGEN SATURATION: 98 % | DIASTOLIC BLOOD PRESSURE: 90 MMHG | BODY MASS INDEX: 30.71 KG/M2

## 2024-09-12 DIAGNOSIS — Z95.5 STATUS POST CORONARY ARTERY STENT PLACEMENT: ICD-10-CM

## 2024-09-12 DIAGNOSIS — I25.2 HISTORY OF NON-ST ELEVATION MYOCARDIAL INFARCTION (NSTEMI): ICD-10-CM

## 2024-09-12 DIAGNOSIS — I10 ESSENTIAL HYPERTENSION: ICD-10-CM

## 2024-09-12 DIAGNOSIS — E78.5 HYPERLIPIDEMIA LDL GOAL <70: Chronic | ICD-10-CM

## 2024-09-12 DIAGNOSIS — I25.118 CORONARY ARTERY DISEASE OF NATIVE ARTERY OF NATIVE HEART WITH STABLE ANGINA PECTORIS: Primary | Chronic | ICD-10-CM

## 2024-09-12 DIAGNOSIS — I10 ACCELERATED HYPERTENSION: ICD-10-CM

## 2024-09-12 PROCEDURE — 99215 OFFICE O/P EST HI 40 MIN: CPT | Mod: PBBFAC,PN

## 2024-09-12 PROCEDURE — 99999 PR PBB SHADOW E&M-EST. PATIENT-LVL V: CPT | Mod: PBBFAC,,,

## 2024-09-12 RX ORDER — LOSARTAN POTASSIUM AND HYDROCHLOROTHIAZIDE 25; 100 MG/1; MG/1
1 TABLET ORAL DAILY
Qty: 90 TABLET | Refills: 3 | Status: SHIPPED | OUTPATIENT
Start: 2024-09-12 | End: 2025-09-12

## 2024-09-12 RX ORDER — CARVEDILOL 25 MG/1
TABLET ORAL
Qty: 180 TABLET | Refills: 4 | Status: SHIPPED | OUTPATIENT
Start: 2024-09-12

## 2024-09-13 DIAGNOSIS — I10 ACCELERATED HYPERTENSION: ICD-10-CM

## 2024-09-13 DIAGNOSIS — I25.2 HISTORY OF NON-ST ELEVATION MYOCARDIAL INFARCTION (NSTEMI): ICD-10-CM

## 2024-09-14 NOTE — TELEPHONE ENCOUNTER
Care Due:                  Date            Visit Type   Department     Provider  --------------------------------------------------------------------------------                                MYCHART                              ANNUAL                              CHECKUP/PHY  [facility]  Last Visit: 06-      S            MEDICINE       Marta Louis                              EP -                              PRIMARY      [facility]  Next Visit: 12-      CARE ([facility])   MEDICINE       Matra Louis                                                            Last  Test          Frequency    Reason                     Performed    Due Date  --------------------------------------------------------------------------------    HBA1C.......  6 months...  metFORMIN, semaglutide,..  06- 12-    [facility] Embedded Care Due Messages. Reference number: [id number].   9/13/2024 10:48:30 PM CDT

## 2024-09-15 RX ORDER — CARVEDILOL 25 MG/1
TABLET ORAL
Qty: 180 TABLET | Refills: 4 | OUTPATIENT
Start: 2024-09-15

## 2024-09-15 NOTE — TELEPHONE ENCOUNTER
Provider Staff:  Action required for this patient    Requires labs      Please see care gap opportunities below in Care Due Message.    Thanks!  [facility]     Appointments      Date Provider   Last Visit   6/12/2024 Marta Louis MD   Next Visit   12/13/2024 Marta Louis MD     Refill Decision Note   Lizet Andrea  is requesting a refill authorization.  Brief Assessment and Rationale for Refill:  Quick Discontinue     Medication Therapy Plan:  Receipt confirmed and dispensed by pharmacy (9/12/2024 12:56 PM CDT) by Marvin Matos DNP      Comments:     Note composed:7:05 AM 09/15/2024

## 2024-09-17 ENCOUNTER — TELEPHONE (OUTPATIENT)
Dept: FAMILY MEDICINE | Facility: CLINIC | Age: 54
End: 2024-09-17
Payer: OTHER GOVERNMENT

## 2024-09-17 DIAGNOSIS — E66.01 CLASS 2 SEVERE OBESITY DUE TO EXCESS CALORIES WITH SERIOUS COMORBIDITY AND BODY MASS INDEX (BMI) OF 36.0 TO 36.9 IN ADULT: Primary | ICD-10-CM

## 2024-09-17 RX ORDER — SEMAGLUTIDE 1.7 MG/.75ML
1.7 INJECTION, SOLUTION SUBCUTANEOUS
Qty: 3 ML | Refills: 5 | Status: SHIPPED | OUTPATIENT
Start: 2024-09-17

## 2024-09-17 NOTE — TELEPHONE ENCOUNTER
Patient is requesting an increase in dosage of Wegovy from 0.5 to 1.7.  Is requesting this be sent to  [facility]  at [address] [facility] [phone], where they have in stock.  Please advise.

## 2024-09-17 NOTE — TELEPHONE ENCOUNTER
----- Message from Tonia Rudd sent at 9/16/2024 12:35 PM CDT -----  Type:  Needs Medical Advice    Who Called: pt  Symptoms (please be specific): wants a call back to go up on dosage  of Wegovy 0.5-1.7 please call back to advise and please send to new pharmacy where she knows its in stock     Pharmacy name and phone #:  [facility]  at [address] [facility] [phone]  Would the patient rather a call back or a response via [facility]? call  Best Call Back Number: [phone]  Additional Information:

## 2024-09-18 ENCOUNTER — TELEPHONE (OUTPATIENT)
Dept: FAMILY MEDICINE | Facility: CLINIC | Age: 54
End: 2024-09-18
Payer: OTHER GOVERNMENT

## 2024-09-18 NOTE — TELEPHONE ENCOUNTER
----- Message from Grisel Zendejas sent at 9/17/2024  1:17 PM CDT -----  Type:  Patient Returning Call    Who Called:Pt   Would the patient rather a call back or a response via [facility]? Call back   Best Call Back Number:[phone]  Additional Information: calling in ref to Wegovy

## 2024-10-16 DIAGNOSIS — I25.2 HISTORY OF NON-ST ELEVATION MYOCARDIAL INFARCTION (NSTEMI): ICD-10-CM

## 2024-10-16 DIAGNOSIS — I25.10 CORONARY ARTERY DISEASE INVOLVING NATIVE CORONARY ARTERY OF NATIVE HEART WITHOUT ANGINA PECTORIS: ICD-10-CM

## 2024-10-16 RX ORDER — ATORVASTATIN CALCIUM 80 MG/1
80 TABLET, FILM COATED ORAL DAILY
Qty: 90 TABLET | Refills: 4 | Status: SHIPPED | OUTPATIENT
Start: 2024-10-16

## 2024-10-16 NOTE — TELEPHONE ENCOUNTER
No care due was identified.  [facility] Embedded Care Due Messages. Reference number: [id number].   10/16/2024 10:12:43 AM CDT

## 2024-11-21 ENCOUNTER — OFFICE VISIT (OUTPATIENT)
Dept: SLEEP MEDICINE | Facility: CLINIC | Age: 54
End: 2024-11-21
Payer: OTHER GOVERNMENT

## 2024-11-21 VITALS
HEIGHT: 70 IN | SYSTOLIC BLOOD PRESSURE: 139 MMHG | HEART RATE: 86 BPM | DIASTOLIC BLOOD PRESSURE: 97 MMHG | BODY MASS INDEX: 29.86 KG/M2 | WEIGHT: 208.56 LBS

## 2024-11-21 DIAGNOSIS — R35.1 NOCTURIA: ICD-10-CM

## 2024-11-21 DIAGNOSIS — I10 ESSENTIAL HYPERTENSION: ICD-10-CM

## 2024-11-21 DIAGNOSIS — R06.83 SNORING: ICD-10-CM

## 2024-11-21 DIAGNOSIS — F51.09 OTHER INSOMNIA NOT DUE TO A SUBSTANCE OR KNOWN PHYSIOLOGICAL CONDITION: Primary | ICD-10-CM

## 2024-11-21 PROCEDURE — 99204 OFFICE O/P NEW MOD 45 MIN: CPT | Mod: S$PBB,,, | Performed by: PHYSICIAN ASSISTANT

## 2024-11-21 PROCEDURE — 99999 PR PBB SHADOW E&M-EST. PATIENT-LVL IV: CPT | Mod: PBBFAC,,, | Performed by: PHYSICIAN ASSISTANT

## 2024-11-21 PROCEDURE — 99214 OFFICE O/P EST MOD 30 MIN: CPT | Mod: PBBFAC | Performed by: PHYSICIAN ASSISTANT

## 2024-11-21 NOTE — PROGRESS NOTES
Referred by Marvin Matos DNP     NEW PATIENT VISIT    Lizet Andrea  is a pleasant 54 y.o. female  with PMH significant for HTN, HLD, CAD, hx NSTEMI, preDM, vit D def, AR, BMI 29+ who presents for sleep evaluation following referral from Cardiology      C/o snoring (worse when extra fatigue), poor disrupted sleep, difficulties with sleep maintenance, and occasional daytime fatigue. Denies drowsiness when driving. Denies sleep walking/tallking. Denies dream enactment behavior.  Denies sx of RLS. Does endorse significant cardiac hx (hx NSTEMI and HTN). States her Cardiologist recommended evaluation for ROGE, which is why she presents today    SLEEP SCHEDULE   Environment    Bed Time 11PM   Sleep Latency 30mins   Arousals 1   Nocturia 1   Back to sleep 1hr   Wake time 6:45AM   Naps None    Work        Past Medical History:   Diagnosis Date    Accelerated hypertension 1/1/2015    Allergic rhinitis 1/3/2015    Coronary artery disease 7/2/2016    Coronary artery disease involving native coronary artery of native heart without angina pectoris 7/2/2016    Hepatitis C antibody test positive 5/4/2016    Negative HCV RNA 05/2016, will check again in 3 months but no detectable HCV virus HCV AB will likely remain positive for life    History of non-ST elevation myocardial infarction (NSTEMI) 7/2/2016    Menorrhagia     Obesity (BMI 30-39.9) 1/2/2015    Prediabetes 3/14/2018    Status post coronary artery stent placement 1/19/2015    LCx stent 1/12015      Patient Active Problem List   Diagnosis    Uterine fibroid    Accelerated hypertension    Allergic rhinitis    Status post coronary artery stent placement    Plantar fasciitis    Hepatitis C antibody test positive    Diuretic-induced hypokalemia    History of non-ST elevation myocardial infarction (NSTEMI)    Coronary artery disease of native artery of native heart with stable angina pectoris    Long-term use of aspirin therapy    Prediabetes    Cubital tunnel  syndrome on left    Essential hypertension    Vitamin D deficiency    Ulnar nerve palsy of right upper extremity    Acute left-sided low back pain with left-sided sciatica    Lateral pain of hip    Decreased strength of lower extremity    Hyperlipidemia LDL goal <70       Current Outpatient Medications:     amLODIPine (NORVASC) 10 MG tablet, Take 1 tablet (10 mg total) by mouth once daily., Disp: 90 tablet, Rfl: 4    aspirin 81 MG Chew, Take 81 mg by mouth once daily., Disp: , Rfl:     atorvastatin (LIPITOR) 80 MG tablet, Take 1 tablet (80 mg total) by mouth once daily., Disp: 90 tablet, Rfl: 4    benzonatate (TESSALON) 100 MG capsule, Take 200 mg by mouth every 8 (eight) hours as needed., Disp: , Rfl:     carvediloL (COREG) 25 MG tablet, TAKE 1 TABLET BY MOUTH TWICE DAILY WITH MEAL, Disp: 180 tablet, Rfl: 4    cholecalciferol, vitamin D3, 125 mcg (5,000 unit) capsule, Take 1 capsule (5,000 Units total) by mouth daily with breakfast., Disp: 100 capsule, Rfl: 4    clindamycin (CLEOCIN T) 1 % lotion, , Disp: , Rfl:     fluticasone propionate (FLONASE) 50 mcg/actuation nasal spray, 1 spray (50 mcg total) by Each Nostril route once daily., Disp: 15.8 mL, Rfl: 0    fluticasone propionate (FLONASE) 50 mcg/actuation nasal spray, 1 spray (50 mcg total) by Each Nostril route once daily., Disp: 16 g, Rfl: 0    ketoconazole (NIZORAL) 2 % cream, Apply topically 2 (two) times daily. Prn facial rash or dryness, Disp: 45 g, Rfl: 2    levocetirizine (XYZAL) 5 MG tablet, Take 1 tablet (5 mg total) by mouth every evening., Disp: 30 tablet, Rfl: 11    losartan-hydrochlorothiazide 100-25 mg (HYZAAR) 100-25 mg per tablet, Take 1 tablet by mouth once daily., Disp: 90 tablet, Rfl: 3    metFORMIN (GLUCOPHAGE-XR) 500 MG ER 24hr tablet, Take 1 tablet (500 mg total) by mouth daily with dinner or evening meal., Disp: 90 tablet, Rfl: 4    potassium chloride (K-TAB) 20 mEq, Take 1 tablet (20 mEq total) by mouth once daily., Disp: 90 tablet, Rfl:  "4    semaglutide, weight loss, (WEGOVY) 1.7 mg/0.75 mL PnIj, Inject 1.7 mg into the skin every 7 days., Disp: 3 mL, Rfl: 5    TRIAMCINOLONE ACETONIDE TOP, , Disp: , Rfl:     cetirizine (ZYRTEC) 10 MG tablet, Take 1 tablet (10 mg total) by mouth once daily., Disp: 30 tablet, Rfl: 2    ezetimibe (ZETIA) 10 mg tablet, Take 1 tablet (10 mg total) by mouth once daily., Disp: 90 tablet, Rfl: 3    nitroGLYCERIN (NITROSTAT) 0.4 MG SL tablet, Place 1 tablet (0.4 mg total) under the tongue every 5 (five) minutes as needed for Chest pain., Disp: 20 tablet, Rfl: 12       Vitals:    24 1405   Weight: 94.6 kg (208 lb 8.9 oz)   Height: 5' 10" (1.778 m)     Physical Exam:    GEN:   Well-appearing  Psych:  Appropriate affect, demonstrates insight  SKIN:  No rash on the face or bridge of the nose      LABS:   Lab Results   Component Value Date    HGB 13.7 06/10/2024    CO2 21 (L) 06/10/2024         RECORDS REVIEWED:    No previous sleep study    24 Echo:    Left Ventricle: The left ventricle is normal in size. Normal wall thickness. There is low normal systolic function with a visually estimated ejection fraction of 50 - 55%. Biplane (2D) method of discs ejection fraction is 61%. There is normal diastolic function.    Right Ventricle: Normal right ventricular cavity size. Systolic function is normal.    Aortic Valve: The aortic valve is a trileaflet valve.    Pulmonary Artery: The estimated pulmonary artery systolic pressure is 15 mmHg.    IVC/SVC: Normal venous pressure at 3 mmHg.    ASSESSMENT    Freedom Sleepiness Scale:  Sitting and readin  Watching TV:    1  Passenger in a car x 1 hr:  3  Sitting quietly after lunch:  0  Lying down to rest in PM:  1  Sitting, inactive in public:  0  Sitting+ talking to someone:  0  Stopped in traffic:   0  Total        PROBLEM DESCRIPTION/ Sx on Presentation  STATUS   Sx ROGE   + snoring (worse when extra fatigue),   Denies arousals, denies witnessed apneas    New   Daytime " Sx   occasional sleepiness when inactive   ESS 5/24 on intake  New   Insomnia   Trouble falling asleep: 30mins  Arousals:         1  Hard to get back to sleep?: 1hr    Prior pertinent medications:  Current pertinent medications:   New   Nocturia   x 1 per sleep period  New   Other issues:       PLAN      -recommend sleep testing   -recommended PSG given cardiac hx, patient wishes to proceed with HST  -HST ordered  -discussed trial therapy if ROGE present and the patient is open to a trial of CPAP therapy  -discussed ROGE and PAP with patient in detail, including possible complications of untreated ROGE like heart attack/stroke  -advised on strict driving precautions; advised never to drive drowsy     Advised on plan of care. Answered all patient questions. Patient verbalized understanding and voiced agreement with plan of care.     RTC if dx of ROGE made and CPAP ordered, will need follow up 31-90 days after receiving machine for compliance         The patient was given open opportunity to ask questions and/or express concerns about treatment plan. All questions/concerns were discussed.     Two patient identifiers used prior to evaluation.

## 2024-12-03 ENCOUNTER — PATIENT MESSAGE (OUTPATIENT)
Dept: DERMATOLOGY | Facility: CLINIC | Age: 54
End: 2024-12-03
Payer: OTHER GOVERNMENT

## 2024-12-06 ENCOUNTER — TELEPHONE (OUTPATIENT)
Dept: SLEEP MEDICINE | Facility: OTHER | Age: 54
End: 2024-12-06
Payer: OTHER GOVERNMENT

## 2024-12-06 ENCOUNTER — LAB VISIT (OUTPATIENT)
Dept: LAB | Facility: HOSPITAL | Age: 54
End: 2024-12-06
Attending: FAMILY MEDICINE
Payer: OTHER GOVERNMENT

## 2024-12-06 DIAGNOSIS — Z95.5 STATUS POST CORONARY ARTERY STENT PLACEMENT: ICD-10-CM

## 2024-12-06 DIAGNOSIS — Z79.899 MEDICATION MANAGEMENT: ICD-10-CM

## 2024-12-06 DIAGNOSIS — R73.03 PREDIABETES: ICD-10-CM

## 2024-12-06 DIAGNOSIS — E55.9 VITAMIN D DEFICIENCY: ICD-10-CM

## 2024-12-06 DIAGNOSIS — I10 ESSENTIAL HYPERTENSION: ICD-10-CM

## 2024-12-06 DIAGNOSIS — R61 NIGHT SWEAT: ICD-10-CM

## 2024-12-06 LAB
25(OH)D3+25(OH)D2 SERPL-MCNC: 26 NG/ML (ref 30–96)
ALBUMIN SERPL BCP-MCNC: 3.6 G/DL (ref 3.5–5.2)
ALP SERPL-CCNC: 45 U/L (ref 40–150)
ALT SERPL W/O P-5'-P-CCNC: 11 U/L (ref 10–44)
ANION GAP SERPL CALC-SCNC: 8 MMOL/L (ref 8–16)
AST SERPL-CCNC: 16 U/L (ref 10–40)
BILIRUB SERPL-MCNC: 0.6 MG/DL (ref 0.1–1)
BUN SERPL-MCNC: 15 MG/DL (ref 6–20)
CALCIUM SERPL-MCNC: 9.6 MG/DL (ref 8.7–10.5)
CHLORIDE SERPL-SCNC: 107 MMOL/L (ref 95–110)
CHOLEST SERPL-MCNC: 152 MG/DL (ref 120–199)
CHOLEST/HDLC SERPL: 3.3 {RATIO} (ref 2–5)
CO2 SERPL-SCNC: 27 MMOL/L (ref 23–29)
CREAT SERPL-MCNC: 0.8 MG/DL (ref 0.5–1.4)
EST. GFR  (NO RACE VARIABLE): >60 ML/MIN/1.73 M^2
ESTIMATED AVG GLUCOSE: 105 MG/DL (ref 68–131)
FSH SERPL-ACNC: 26.3 MIU/ML
GLUCOSE SERPL-MCNC: 75 MG/DL (ref 70–110)
HBA1C MFR BLD: 5.3 % (ref 4–5.6)
HDLC SERPL-MCNC: 46 MG/DL (ref 40–75)
HDLC SERPL: 30.3 % (ref 20–50)
LDLC SERPL CALC-MCNC: 94.2 MG/DL (ref 63–159)
NONHDLC SERPL-MCNC: 106 MG/DL
POTASSIUM SERPL-SCNC: 3.5 MMOL/L (ref 3.5–5.1)
PROT SERPL-MCNC: 7.1 G/DL (ref 6–8.4)
SODIUM SERPL-SCNC: 142 MMOL/L (ref 136–145)
TRIGL SERPL-MCNC: 59 MG/DL (ref 30–150)
VIT B12 SERPL-MCNC: 580 PG/ML (ref 210–950)

## 2024-12-06 PROCEDURE — 80061 LIPID PANEL: CPT | Performed by: FAMILY MEDICINE

## 2024-12-06 PROCEDURE — 83036 HEMOGLOBIN GLYCOSYLATED A1C: CPT | Performed by: FAMILY MEDICINE

## 2024-12-06 PROCEDURE — 80053 COMPREHEN METABOLIC PANEL: CPT | Performed by: FAMILY MEDICINE

## 2024-12-06 PROCEDURE — 82607 VITAMIN B-12: CPT | Performed by: FAMILY MEDICINE

## 2024-12-06 PROCEDURE — 82306 VITAMIN D 25 HYDROXY: CPT | Performed by: FAMILY MEDICINE

## 2024-12-06 PROCEDURE — 36415 COLL VENOUS BLD VENIPUNCTURE: CPT | Performed by: FAMILY MEDICINE

## 2024-12-06 PROCEDURE — 83001 ASSAY OF GONADOTROPIN (FSH): CPT | Performed by: FAMILY MEDICINE

## 2024-12-13 ENCOUNTER — OFFICE VISIT (OUTPATIENT)
Dept: FAMILY MEDICINE | Facility: CLINIC | Age: 54
End: 2024-12-13
Payer: OTHER GOVERNMENT

## 2024-12-13 VITALS
WEIGHT: 203.5 LBS | BODY MASS INDEX: 29.13 KG/M2 | SYSTOLIC BLOOD PRESSURE: 118 MMHG | HEIGHT: 70 IN | HEART RATE: 88 BPM | DIASTOLIC BLOOD PRESSURE: 74 MMHG | OXYGEN SATURATION: 98 %

## 2024-12-13 DIAGNOSIS — Z95.5 STATUS POST CORONARY ARTERY STENT PLACEMENT: ICD-10-CM

## 2024-12-13 DIAGNOSIS — E78.5 HYPERLIPIDEMIA LDL GOAL <70: Chronic | ICD-10-CM

## 2024-12-13 DIAGNOSIS — I25.118 CORONARY ARTERY DISEASE OF NATIVE ARTERY OF NATIVE HEART WITH STABLE ANGINA PECTORIS: Primary | Chronic | ICD-10-CM

## 2024-12-13 DIAGNOSIS — Z79.899 MEDICATION MANAGEMENT: ICD-10-CM

## 2024-12-13 DIAGNOSIS — E87.6 DIURETIC-INDUCED HYPOKALEMIA: ICD-10-CM

## 2024-12-13 DIAGNOSIS — T50.2X5A DIURETIC-INDUCED HYPOKALEMIA: ICD-10-CM

## 2024-12-13 DIAGNOSIS — E55.9 VITAMIN D DEFICIENCY: ICD-10-CM

## 2024-12-13 DIAGNOSIS — E66.01 CLASS 2 SEVERE OBESITY DUE TO EXCESS CALORIES WITH SERIOUS COMORBIDITY AND BODY MASS INDEX (BMI) OF 36.0 TO 36.9 IN ADULT: ICD-10-CM

## 2024-12-13 DIAGNOSIS — E66.812 CLASS 2 SEVERE OBESITY DUE TO EXCESS CALORIES WITH SERIOUS COMORBIDITY AND BODY MASS INDEX (BMI) OF 36.0 TO 36.9 IN ADULT: ICD-10-CM

## 2024-12-13 DIAGNOSIS — I10 ACCELERATED HYPERTENSION: ICD-10-CM

## 2024-12-13 DIAGNOSIS — I25.2 HISTORY OF NON-ST ELEVATION MYOCARDIAL INFARCTION (NSTEMI): ICD-10-CM

## 2024-12-13 DIAGNOSIS — Z11.3 SCREENING FOR STD (SEXUALLY TRANSMITTED DISEASE): ICD-10-CM

## 2024-12-13 DIAGNOSIS — N95.1 PERIMENOPAUSAL: ICD-10-CM

## 2024-12-13 DIAGNOSIS — Z79.82 LONG-TERM USE OF ASPIRIN THERAPY: ICD-10-CM

## 2024-12-13 DIAGNOSIS — R73.03 PREDIABETES: ICD-10-CM

## 2024-12-13 PROCEDURE — 99999 PR PBB SHADOW E&M-EST. PATIENT-LVL IV: CPT | Mod: PBBFAC,,, | Performed by: FAMILY MEDICINE

## 2024-12-13 PROCEDURE — 99214 OFFICE O/P EST MOD 30 MIN: CPT | Mod: PBBFAC,PO | Performed by: FAMILY MEDICINE

## 2024-12-13 RX ORDER — LOSARTAN POTASSIUM AND HYDROCHLOROTHIAZIDE 25; 100 MG/1; MG/1
1 TABLET ORAL DAILY
Qty: 90 TABLET | Refills: 3 | Status: SHIPPED | OUTPATIENT
Start: 2024-12-13 | End: 2025-12-13

## 2024-12-13 RX ORDER — VIT C/E/ZN/COPPR/LUTEIN/ZEAXAN 250MG-90MG
5000 CAPSULE ORAL
Qty: 100 CAPSULE | Refills: 4 | Status: SHIPPED | OUTPATIENT
Start: 2024-12-13

## 2024-12-13 RX ORDER — EZETIMIBE 10 MG/1
10 TABLET ORAL DAILY
Qty: 90 TABLET | Refills: 3 | Status: SHIPPED | OUTPATIENT
Start: 2024-12-13 | End: 2025-12-13

## 2024-12-13 RX ORDER — SEMAGLUTIDE 1.7 MG/.75ML
1.7 INJECTION, SOLUTION SUBCUTANEOUS
Qty: 9 ML | Refills: 2 | Status: SHIPPED | OUTPATIENT
Start: 2024-12-13

## 2024-12-13 RX ORDER — POTASSIUM CHLORIDE 1500 MG/1
20 TABLET, EXTENDED RELEASE ORAL DAILY
Qty: 90 TABLET | Refills: 4 | Status: SHIPPED | OUTPATIENT
Start: 2024-12-13

## 2024-12-13 NOTE — PROGRESS NOTES
Office Visit    Patient Name: Lizet Andrea    : 1970  MRN: [id number]    Subjective:  Lizet is a 54 y.o. female who presents today for:    Follow-up    Seen by me for annual physical 2024.  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      Seen by sleep medicine 2024 and orders for home sleep study entered    Seen by cardiology [facility] on 24: F/U after medication changes for HTN. Patient of Dr. Cox-- Did not bring BP logs. Confusion about medication regimen, has not started Hyzaar (still on chlorthalidone and losartan) and has stopped Coreg.   AVS reviewed with patient personally, to restart Coreg 25 BID, stop losartan and chlorthalidone, and start Hyzaar 100/25.  She is also prescribed amlodipine 10 mg daily.      Insurance is covering Semaglutide and currently on 1.7 mg weekly-- has been on the medication now for about 10-11 months(initially through [facility]).  She is also taking metformin 500 XR daily     SUBJECTIVE:   Lizet Andrea is a 54 year old female who presents today for lab review in six-month follow-up of chronic conditions which include: CAD h/o stent/NSTEMI, HTN, prediabetes, HLD, and obesity.   She is feeling well today and overall healthy.  She has been losing weight with the help of Wegovy.     Acute complaints: NONE  Cycles wax and waning-- irregular bleeding, no painful or heavy bleeding recently.       Labs prior to visit 24 show A1c with further improvement to 5.3 (was 6.2 about 10 months ago when she started Wegovy), lipid panel with LDL of 94 (goal is less than 70), normal CMP & B12, vitamin-D remains low at 26, FSH is now borderline postmenopausal at 26.3.  She has been advised to take vitamin D3 5000 IU daily but has been inconsistent and is taking an over-the-counter supplement of a lower dose.  On 06/10/2024 LDL was 72--she is  prescribed Lipitor 80+ Zetia 10 but it seems that she ran out of Zetia and has been taking Lipitor only which would explain recent increase in LDL from 72-> 94.     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 does not have trouble falling or staying asleep. Needs to complete home sleep study.  WEIGHT:  Have previously lost 5 lb and recently an additional 25 with improved BMI down to 29.  Currently taking Wegovy 1.7     Immunizations: declines FLU shot & declines seasonal COVID, TDaP 10/25/2017, COVID-19 VACCINE SERIES COMPLETED 2/3/21 w/ Pfizer BOOSTER 12/21/22, SHINGRIX 2/2  1/29/24     Screening Tests: mammogram 12/27/23-- repeat 1 year & SCHEDULED 12/30/24  PAP/HPV WNL/(-) 4/28/21- REPEAT 5 YRS (4/2026), HIV (-) 3/16/20, Hep C (-) 3/16/20,  colonoscopy 1/19/23-- repeat 10 years(1/2033)        Eye/Dental exams: UTD with both and no concerns    Echo 08/13/2024:  Overall unremarkable.    Left Ventricle: The left ventricle is normal in size. Normal wall thickness. There is low normal systolic function with a visually estimated ejection fraction of 50 - 55%. Biplane (2D) method of discs ejection fraction is 61%. There is normal diastolic function.    Right Ventricle: Normal right ventricular cavity size. Systolic function is normal.    Aortic Valve: The aortic valve is a trileaflet valve.    Pulmonary Artery: The estimated pulmonary artery systolic pressure is 15 mmHg.    IVC/SVC: Normal venous pressure at 3 mmHg.    PAST MEDICAL HISTORY, SURGICAL/SOCIAL/FAMILY HISTORY REVIEWED AS PER CHART, WITH PERTINENT FINDINGS  INCLUDED IN HISTORY SECTION OF NOTE.     Current Medications    Medication List with Changes/Refills   Current Medications    AMLODIPINE (NORVASC) 10 MG TABLET    Take 1 tablet (10 mg total) by mouth once daily.    ASPIRIN 81 MG CHEW    Take 81 mg by mouth once daily.    ATORVASTATIN (LIPITOR) 80 MG TABLET    Take 1 tablet (80 mg total) by mouth once daily.    CARVEDILOL (COREG) 25 MG TABLET    TAKE 1 TABLET BY MOUTH TWICE DAILY WITH MEAL    CLINDAMYCIN (CLEOCIN T) 1 % LOTION        FLUTICASONE PROPIONATE (FLONASE) 50 MCG/ACTUATION NASAL SPRAY    1 spray (50 mcg total) by Each Nostril route once daily.    KETOCONAZOLE (NIZORAL) 2 % CREAM    Apply topically 2 (two) times daily. Prn facial rash or dryness    LEVOCETIRIZINE (XYZAL) 5 MG TABLET    Take 1 tablet (5 mg total) by mouth every evening.    METFORMIN (GLUCOPHAGE-XR) 500 MG ER 24HR TABLET    Take 1 tablet (500 mg total) by mouth daily with dinner or evening meal.    NITROGLYCERIN (NITROSTAT) 0.4 MG SL TABLET    Place 1 tablet (0.4 mg total) under the tongue every 5 (five) minutes as needed for Chest pain.    TRIAMCINOLONE ACETONIDE TOP       Changed and/or Refilled Medications    Modified Medication Previous Medication    CHOLECALCIFEROL, VITAMIN D3, 125 MCG (5,000 UNIT) CAPSULE cholecalciferol, vitamin D3, 125 mcg (5,000 unit) capsule       Take 1 capsule (5,000 Units total) by mouth daily with breakfast.    Take 1 capsule (5,000 Units total) by mouth daily with breakfast.    EZETIMIBE (ZETIA) 10 MG TABLET ezetimibe (ZETIA) 10 mg tablet       Take 1 tablet (10 mg total) by mouth once daily.    Take 1 tablet (10 mg total) by mouth once daily.    LOSARTAN-HYDROCHLOROTHIAZIDE 100-25 MG (HYZAAR) 100-25 MG PER TABLET losartan-hydrochlorothiazide 100-25 mg (HYZAAR) 100-25 mg per tablet       Take 1 tablet by mouth once daily.    Take 1 tablet by mouth once daily.    POTASSIUM CHLORIDE (K-TAB) 20 MEQ potassium chloride (K-TAB) 20 mEq       Take 1 tablet (20 mEq  "total) by mouth once daily.    Take 1 tablet (20 mEq total) by mouth once daily.    SEMAGLUTIDE, WEIGHT LOSS, (WEGOVY) 1.7 MG/0.75 ML PNIJ semaglutide, weight loss, (WEGOVY) 1.7 mg/0.75 mL PnIj       Inject 1.7 mg into the skin every 7 days.    Inject 1.7 mg into the skin every 7 days.   Discontinued Medications    BENZONATATE (TESSALON) 100 MG CAPSULE    Take 200 mg by mouth every 8 (eight) hours as needed.    CETIRIZINE (ZYRTEC) 10 MG TABLET    Take 1 tablet (10 mg total) by mouth once daily.    FLUTICASONE PROPIONATE (FLONASE) 50 MCG/ACTUATION NASAL SPRAY    1 spray (50 mcg total) by Each Nostril route once daily.       Allergies   Review of patient's allergies indicates:   Allergen Reactions    Crab Rash    Shrimp Rash         Review of Systems (Pertinent positives)  Review of Systems   Constitutional:  Negative for unexpected weight change (Intentional weight loss with help of wegovy).   Eyes:  Negative for visual disturbance.   Respiratory:  Negative for chest tightness and shortness of breath.    Cardiovascular:  Negative for chest pain, palpitations and leg swelling.   Gastrointestinal:  Negative for constipation and diarrhea.   Genitourinary:  Positive for menstrual problem (increasingly irregular).   Allergic/Immunologic: Positive for environmental allergies.   Neurological:  Negative for dizziness, light-headedness and headaches.   Psychiatric/Behavioral:  Negative for sleep disturbance.        /74   Pulse 88   Ht 5' 10" (1.778 m)   Wt 92.3 kg (203 lb 7.8 oz)   LMP 12/11/2024   SpO2 98%   BMI 29.20 kg/m²     Physical Exam  Vitals reviewed.   Constitutional:       General: She is not in acute distress.     Appearance: Normal appearance. She is well-developed.   HENT:      Head: Normocephalic and atraumatic.   Eyes:      Conjunctiva/sclera: Conjunctivae normal.   Cardiovascular:      Rate and Rhythm: Normal rate and regular rhythm.   Pulmonary:      Effort: Pulmonary effort is normal.      " Breath sounds: Normal breath sounds.   Musculoskeletal:      Right lower leg: No edema.      Left lower leg: No edema.   Skin:     General: Skin is warm and dry.   Neurological:      General: No focal deficit present.      Mental Status: She is alert and oriented to person, place, and time.   Psychiatric:         Mood and Affect: Mood normal.         Behavior: Behavior normal.           Assessment/Plan:  Lizet Andrea is a 54 y.o. female who presents today for :        ICD-10-CM ICD-9-CM    1. Coronary artery disease of native artery of native heart with stable angina pectoris  I25.118 414.01 Hemoglobin A1C     413.9 Comprehensive Metabolic Panel      Lipid Panel      CBC Auto Differential      TSH      Vitamin D      Magnesium      Ferritin      ezetimibe (ZETIA) 10 mg tablet      2. History of non-ST elevation myocardial infarction (NSTEMI)  I25.2 412       3. Status post coronary artery stent placement  Z95.5 V45.82       4. Accelerated hypertension  I10 401.0 losartan-hydrochlorothiazide 100-25 mg (HYZAAR) 100-25 mg per tablet      5. Class 2 severe obesity due to excess calories with serious comorbidity and body mass index (BMI) of 36.0 to 36.9 in adult  E66.812 278.01 semaglutide, weight loss, (WEGOVY) 1.7 mg/0.75 mL PnIj    E66.01 V85.36     Z68.36        6. Prediabetes  R73.03 790.29 Hemoglobin A1C      Comprehensive Metabolic Panel      Lipid Panel      CBC Auto Differential      TSH      Vitamin D      Magnesium      Ferritin      7. Hyperlipidemia LDL goal <70  E78.5 272.4 Hemoglobin A1C      Comprehensive Metabolic Panel      Lipid Panel      CBC Auto Differential      TSH      Vitamin D      Magnesium      Ferritin      ezetimibe (ZETIA) 10 mg tablet      8. Vitamin D deficiency  E55.9 268.9 Vitamin D      cholecalciferol, vitamin D3, 125 mcg (5,000 unit) capsule      9. Long-term use of aspirin therapy  Z79.82 V58.66       10. Diuretic-induced hypokalemia  E87.6 276.8 Hemoglobin A1C    T50.2X5A  E944.4 Comprehensive Metabolic Panel      Lipid Panel      CBC Auto Differential      TSH      Vitamin D      Magnesium      Ferritin      potassium chloride (K-TAB) 20 mEq      11. Medication management  Z79.899 V58.69 Hemoglobin A1C      Comprehensive Metabolic Panel      Lipid Panel      CBC Auto Differential      TSH      Vitamin D      Magnesium      Ferritin      12. Screening for STD (sexually transmitted disease)  Z11.3 V74.5 HIV 1/2 Ag/Ab (4th Gen)      Treponema Pallidium Antibodies IgG, IgM      Hepatitis B Surface Antigen      C. trachomatis/N. gonorrhoeae by AMP DNA      13. Perimenopausal  N95.1 627.2         HEALTH MAINTENANCE REVIEWED AND UP-TO-DATE INCLUDING MAMMOGRAM--SCHEDULED for 12/30/24, COLONOSCOPY (next due 1/2033), PAP(next due 4/2026) EXCEPT SHE DECLINES SEASONAL FLU AND COVID VACCINES.       Essential hypertension  - BP stable in office today. Continue home monitoring.   - The current medical regimen is effective. Continue current plan and medications: Amlodipine 10 mg, Carvedilol 25 BID, and Hyzaar 100/25  -kidney function potassium normal on labs 12/06/2024   -repeat labs with physical in 6 months.     Coronary artery disease involving native coronary artery of native heart without angina pectoris  History of non-ST elevation myocardial infarction (NSTEMI)  - Followed up with Dr Cox 8/15/24 and [facility] 9/12/24, and echo 08/13/2024 unremarkable.  - Continue ASA and Statin daily-- Lipitor 80 with Zetia 10.  - Stable coronary artery disease  - Nitroglycerin PRN - NO RECENT NEED.    - Target LDL < 70. Continue Zetia and Lipitor 80 mg qhs.Prior LDL 72-- ACCIDENTALLY RAN OUT OF ZETIA, RESUME ZETIA 10 WITH LIPITOR 80 AND RECHECK IN 6 MONTHS.  -blood pressure with good control on current regimen l.      Prediabetes associated with OBESITY   -has lost about 5 lb in the last several months on GLP 1 but A1c has improved significantly  - A1C WITH SIGNIFICANT IMPROVEMENT FROM 6.2 DOWN TO  5.3 ON WEEKLY SEMAGLUTIDE INJECTIONS AND DAILY 500 MG EXTENDED RELEASE METFORMIN.    ON SEMAGLUTIDE PER [facility]  -advised to continue GLP 1, metformin but needs to add in some form of physical activity-- she joined a gym and hopes to start going regularly starting tomorrow.  Consider a nutrition consult.  Continue attention to low carb diet.    -repeat labs in 6 months     Vitamin D deficiency  - recently inconsistent about vitamin-D supplement.  Vitamin D level low (26) on most recent labs.  - advised on on Vitamin D3 5,000 IU daily but inconsistent--plans to take more regularly, prescription sent .  will recheck Vitamin D level in 6 months.     Diuretic-induced hypokalemia  - Potassium WNL on most recent labs  - Currently on K-tab 20 mEq daily.   -continue current regimen & recheck with labs in 6 months     PERIMENOPAUSAL   FSH is just under postmenopausal range-she is having increasingly irregular/erratic menstrual cycles but no excess bleeding or pain.  No significant symptoms such as hot flashes.  Will monitor for now.  She will advise if she is having increased concerns    A total of 40 minutes was spent on this encounter that included explaining differentials, symptom counseling, review of recent results, and next steps of diagnosis and management plan, along with direct documentation of the encounter.      There are no Patient Instructions on file for this visit.      Follow up in about 6 months (around 6/13/2025) for to follow up on lab results, return as needed for new concerns.

## 2024-12-26 ENCOUNTER — OFFICE VISIT (OUTPATIENT)
Dept: DERMATOLOGY | Facility: CLINIC | Age: 54
End: 2024-12-26
Payer: OTHER GOVERNMENT

## 2024-12-26 DIAGNOSIS — L21.9 SEBORRHEIC DERMATITIS: ICD-10-CM

## 2024-12-26 DIAGNOSIS — L70.9 ACNE, UNSPECIFIED ACNE TYPE: Primary | ICD-10-CM

## 2024-12-26 PROCEDURE — 99999 PR PBB SHADOW E&M-EST. PATIENT-LVL III: CPT | Mod: PBBFAC,,, | Performed by: DERMATOLOGY

## 2024-12-26 PROCEDURE — 99213 OFFICE O/P EST LOW 20 MIN: CPT | Mod: PBBFAC,PO | Performed by: DERMATOLOGY

## 2024-12-26 RX ORDER — KETOCONAZOLE 20 MG/G
CREAM TOPICAL 2 TIMES DAILY
Qty: 45 G | Refills: 2 | Status: SHIPPED | OUTPATIENT
Start: 2024-12-26

## 2024-12-26 RX ORDER — DOXYCYCLINE HYCLATE 100 MG
TABLET ORAL
Qty: 30 TABLET | Refills: 0 | Status: SHIPPED | OUTPATIENT
Start: 2024-12-26

## 2024-12-26 RX ORDER — CLINDAMYCIN PHOSPHATE 10 UG/ML
LOTION TOPICAL
Qty: 60 ML | Refills: 3 | Status: SHIPPED | OUTPATIENT
Start: 2024-12-26

## 2024-12-26 NOTE — PROGRESS NOTES
Subjective:      Patient ID:  Lizet Andrea is a 54 y.o. female who presents for   Chief Complaint   Patient presents with    Acne     Face, several months    Follow-up     Seborrheic dermatitis     Follow up seborrheic dermatitis, see notes Dr Newman, using the ketoconizole prn and t gel shampoo works well.  Now has break out on forehead no tx.     Acne - Initial  Affected locations: face  Signs / symptoms: asymptomatic      Review of Systems   Constitutional:  Negative for fever, chills, weight loss, weight gain, fatigue, night sweats and malaise.   Skin:  Negative for itching, daily sunscreen use, activity-related sunscreen use and wears hat.   Hematologic/Lymphatic: Does not bruise/bleed easily (bruises easily only).       Objective:   Physical Exam   Constitutional: She appears well-developed and well-nourished.   Neurological: She is alert and oriented to person, place, and time.   Psychiatric: She has a normal mood and affect.   Skin:   Areas Examined (abnormalities noted in diagram):   Head / Face Inspection Performed            Diagram Legend     Erythematous scaling macule/papule c/w actinic keratosis       Vascular papule c/w angioma      Pigmented verrucoid papule/plaque c/w seborrheic keratosis      Yellow umbilicated papule c/w sebaceous hyperplasia      Irregularly shaped tan macule c/w lentigo     1-2 mm smooth white papules consistent with Milia      Movable subcutaneous cyst with punctum c/w epidermal inclusion cyst      Subcutaneous movable cyst c/w pilar cyst      Firm pink to brown papule c/w dermatofibroma      Pedunculated fleshy papule(s) c/w skin tag(s)      Evenly pigmented macule c/w junctional nevus     Mildly variegated pigmented, slightly irregular-bordered macule c/w mildly atypical nevus      Flesh colored to evenly pigmented papule c/w intradermal nevus       Pink pearly papule/plaque c/w basal cell carcinoma      Erythematous hyperkeratotic cursted plaque c/w SCC       Surgical scar with no sign of skin cancer recurrence      Open and closed comedones      Inflammatory papules and pustules      Verrucoid papule consistent consistent with wart     Erythematous eczematous patches and plaques     Dystrophic onycholytic nail with subungual debris c/w onychomycosis     Umbilicated papule    Erythematous-base heme-crusted tan verrucoid plaque consistent with inflamed seborrheic keratosis     Erythematous Silvery Scaling Plaque c/w Psoriasis     See annotation      Assessment / Plan:        Acne, unspecified acne type  -     doxycycline (VIBRA-TABS) 100 MG tablet; Take one qd with food  Dispense: 30 tablet; Refill: 0  -     clindamycin (CLEOCIN T) 1 % lotion; Use on face hs  Dispense: 60 mL; Refill: 3  Call if recurs  Aveene acne cleanser for forehead     Seborrheic dermatitis, chronic  -     ketoconazole (NIZORAL) 2 % cream; Apply topically 2 (two) times daily. Prn facial rash or dryness  Dispense: 45 g; Refill: 2  Cont t gel shampoo             Follow up in about 1 year (around 12/26/2025).

## 2024-12-30 ENCOUNTER — HOSPITAL ENCOUNTER (OUTPATIENT)
Dept: RADIOLOGY | Facility: HOSPITAL | Age: 54
Discharge: HOME | End: 2024-12-30
Attending: FAMILY MEDICINE
Payer: OTHER GOVERNMENT

## 2024-12-30 VITALS — HEIGHT: 70 IN | WEIGHT: 203 LBS | BODY MASS INDEX: 29.06 KG/M2

## 2024-12-30 DIAGNOSIS — Z12.31 SCREENING MAMMOGRAM FOR BREAST CANCER: ICD-10-CM

## 2024-12-30 PROCEDURE — 77067 SCR MAMMO BI INCL CAD: CPT | Mod: 26,,, | Performed by: RADIOLOGY

## 2024-12-30 PROCEDURE — 77063 BREAST TOMOSYNTHESIS BI: CPT | Mod: 26,,, | Performed by: RADIOLOGY

## 2024-12-30 PROCEDURE — 77063 BREAST TOMOSYNTHESIS BI: CPT | Mod: TC

## 2025-01-03 ENCOUNTER — TELEPHONE (OUTPATIENT)
Dept: INTERNAL MEDICINE | Facility: CLINIC | Age: 55
End: 2025-01-03
Payer: COMMERCIAL

## 2025-01-03 NOTE — TELEPHONE ENCOUNTER
Called pharmacy and stated that they would have to run the medication through pts primary insurance. They do not have it on file and asked me to contact pt to bring it to the pharmacy.

## 2025-01-03 NOTE — TELEPHONE ENCOUNTER
----- Message from David sent at 1/3/2025 12:12 PM CST -----  Contact: pt  Type:  Needs Prior Auth     Who Called: pt   Pharmacy name and phone #:  [facility] [place] [address] ph#: [phone]  Would the patient rather a call back or a response via [facility]? call  Best Call Back Number: [phone]  Additional Information:   Pt stated in order to get her prescription pharm stated she needs a PA for semaglutide, weight loss, (WEGOVY) 1.7 mg/0.75 mL PnIj

## 2025-01-03 NOTE — TELEPHONE ENCOUNTER
Contacted pt and told her we were not able to do the PA as it needs to go through pts primary insurance. Pt stated that she has been getting it for the past 4 months and doesn't know why she is having to do this now. I told her I was not sure but she needed to bring her [employer] info to the pharmacy

## 2025-01-06 ENCOUNTER — TELEPHONE (OUTPATIENT)
Dept: INTERNAL MEDICINE | Facility: CLINIC | Age: 55
End: 2025-01-06
Payer: COMMERCIAL

## 2025-01-06 NOTE — TELEPHONE ENCOUNTER
----- Message from Terrie sent at 1/3/2025  3:16 PM CST -----  Contact: [phone] patient  Patient is returning a phone call.    Who left a message for the patient: Moy Castle MA    Does patient know what this is regarding:  PA     Would you like a call back, or a response through your [facility] portal?:  call     Comments: Patient want to know if a PA is being given to both insurance company. Patient would like a call

## 2025-01-14 ENCOUNTER — TELEPHONE (OUTPATIENT)
Dept: SLEEP MEDICINE | Facility: OTHER | Age: 55
End: 2025-01-14
Payer: COMMERCIAL

## 2025-01-19 ENCOUNTER — PATIENT MESSAGE (OUTPATIENT)
Dept: OBSTETRICS AND GYNECOLOGY | Facility: CLINIC | Age: 55
End: 2025-01-19
Payer: OTHER GOVERNMENT

## 2025-02-16 ENCOUNTER — HOSPITAL ENCOUNTER (EMERGENCY)
Facility: OTHER | Age: 55
Discharge: HOME | End: 2025-02-17
Attending: EMERGENCY MEDICINE
Payer: COMMERCIAL

## 2025-02-16 VITALS
SYSTOLIC BLOOD PRESSURE: 202 MMHG | OXYGEN SATURATION: 100 % | RESPIRATION RATE: 17 BRPM | DIASTOLIC BLOOD PRESSURE: 112 MMHG | HEIGHT: 70 IN | WEIGHT: 199.06 LBS | HEART RATE: 88 BPM | BODY MASS INDEX: 28.5 KG/M2

## 2025-02-16 DIAGNOSIS — M54.50 ACUTE RIGHT-SIDED LOW BACK PAIN WITHOUT SCIATICA: Primary | ICD-10-CM

## 2025-02-16 LAB
ANION GAP SERPL CALC-SCNC: 9 MMOL/L (ref 8–16)
B-HCG UR QL: NEGATIVE
BILIRUB UR QL STRIP: NEGATIVE
BUN SERPL-MCNC: 12 MG/DL (ref 6–20)
CALCIUM SERPL-MCNC: 9.3 MG/DL (ref 8.7–10.5)
CHLORIDE SERPL-SCNC: 109 MMOL/L (ref 95–110)
CLARITY UR: CLEAR
CO2 SERPL-SCNC: 23 MMOL/L (ref 23–29)
COLOR UR: YELLOW
CREAT SERPL-MCNC: 0.7 MG/DL (ref 0.5–1.4)
CTP QC/QA: YES
EST. GFR  (NO RACE VARIABLE): >60 ML/MIN/1.73 M^2
GLUCOSE SERPL-MCNC: 80 MG/DL (ref 70–110)
GLUCOSE UR QL STRIP: NEGATIVE
HGB UR QL STRIP: NEGATIVE
KETONES UR QL STRIP: NEGATIVE
LEUKOCYTE ESTERASE UR QL STRIP: NEGATIVE
NITRITE UR QL STRIP: NEGATIVE
PH UR STRIP: 6 [PH] (ref 5–8)
POTASSIUM SERPL-SCNC: 3.8 MMOL/L (ref 3.5–5.1)
PROT UR QL STRIP: ABNORMAL
SODIUM SERPL-SCNC: 141 MMOL/L (ref 136–145)
SP GR UR STRIP: 1.02 (ref 1–1.03)
URN SPEC COLLECT METH UR: ABNORMAL
UROBILINOGEN UR STRIP-ACNC: NEGATIVE EU/DL

## 2025-02-16 PROCEDURE — 63600175 PHARM REV CODE 636 W HCPCS: Mod: JZ,TB | Performed by: EMERGENCY MEDICINE

## 2025-02-16 PROCEDURE — 25000003 PHARM REV CODE 250: Performed by: EMERGENCY MEDICINE

## 2025-02-16 PROCEDURE — 80048 BASIC METABOLIC PNL TOTAL CA: CPT | Performed by: EMERGENCY MEDICINE

## 2025-02-16 PROCEDURE — 99285 EMERGENCY DEPT VISIT HI MDM: CPT | Mod: 25

## 2025-02-16 PROCEDURE — 81025 URINE PREGNANCY TEST: CPT | Performed by: EMERGENCY MEDICINE

## 2025-02-16 PROCEDURE — 96374 THER/PROPH/DIAG INJ IV PUSH: CPT

## 2025-02-16 PROCEDURE — 81003 URINALYSIS AUTO W/O SCOPE: CPT | Performed by: EMERGENCY MEDICINE

## 2025-02-16 RX ORDER — LIDOCAINE 50 MG/G
1 PATCH TOPICAL
Status: DISCONTINUED | OUTPATIENT
Start: 2025-02-16 | End: 2025-02-17 | Stop reason: HOSPADM

## 2025-02-16 RX ORDER — KETOROLAC TROMETHAMINE 30 MG/ML
10 INJECTION, SOLUTION INTRAMUSCULAR; INTRAVENOUS
Status: COMPLETED | OUTPATIENT
Start: 2025-02-16 | End: 2025-02-16

## 2025-02-16 RX ORDER — SODIUM CHLORIDE 9 MG/ML
1000 INJECTION, SOLUTION INTRAVENOUS
Status: COMPLETED | OUTPATIENT
Start: 2025-02-16 | End: 2025-02-16

## 2025-02-16 RX ADMIN — KETOROLAC TROMETHAMINE 10 MG: 30 INJECTION, SOLUTION INTRAMUSCULAR; INTRAVENOUS at 10:02

## 2025-02-16 RX ADMIN — LIDOCAINE 5% 1 PATCH: 700 PATCH TOPICAL at 10:02

## 2025-02-16 RX ADMIN — SODIUM CHLORIDE 1000 ML: 9 INJECTION, SOLUTION INTRAVENOUS at 10:02

## 2025-02-17 ENCOUNTER — TELEPHONE (OUTPATIENT)
Dept: INTERNAL MEDICINE | Facility: CLINIC | Age: 55
End: 2025-02-17
Payer: COMMERCIAL

## 2025-02-17 RX ORDER — IBUPROFEN 800 MG/1
800 TABLET ORAL EVERY 8 HOURS PRN
Qty: 15 TABLET | Refills: 0 | Status: SHIPPED | OUTPATIENT
Start: 2025-02-17 | End: 2025-02-22

## 2025-02-17 RX ORDER — METHOCARBAMOL 500 MG/1
1000 TABLET, FILM COATED ORAL 3 TIMES DAILY
Qty: 30 TABLET | Refills: 0 | Status: SHIPPED | OUTPATIENT
Start: 2025-02-17 | End: 2025-02-19

## 2025-02-17 RX ORDER — LIDOCAINE 50 MG/G
1 PATCH TOPICAL DAILY
Qty: 7 PATCH | Refills: 0 | Status: SHIPPED | OUTPATIENT
Start: 2025-02-17 | End: 2025-02-24

## 2025-02-17 NOTE — ED PROVIDER NOTES
Encounter Date: 2025       History     Chief Complaint   Patient presents with    Flank Pain     Reproducible R sided flank pain that started last night. Recently changed sleeping position s/t pain in neck after sleeping funny. Pain is worse with ambulation, position changes, movement of arms. HX HTN, compliant with home BP meds.      54-year-old female with history of hypertension, CAD, and prediabetes presents for evaluation of atraumatic low back pain which she 1st noted yesterday morning.  The patient states that she slept with a lot of pillows and when she woke up she was having the back pain.  She thought it was related to pillows but it worsened today.  She does not recall any injuries or strenuous activity prior to the onset of the pain.  She describes the pain as a sharp sensation in the right lower back that comes and goes in waves with no alleviating or aggravating factors.  She denies any associated nausea, vomiting, urinary symptoms, changes in bowel movements, fever, chills, or abdominal pain.  She denies any previous similar pain.  She did take ibuprofen at home with no relief.      Review of patient's allergies indicates:   Allergen Reactions    Crab Rash    Shrimp Rash     Past Medical History:   Diagnosis Date    Accelerated hypertension 2015    Allergic rhinitis 1/3/2015    Coronary artery disease 2016    Coronary artery disease involving native coronary artery of native heart without angina pectoris 2016    Hepatitis C antibody test positive 2016    Negative HCV RNA 2016, will check again in 3 months but no detectable HCV virus HCV AB will likely remain positive for life    History of non-ST elevation myocardial infarction (NSTEMI) 2016    Menorrhagia     Obesity (BMI 30-39.9) 2015    Prediabetes 3/14/2018    Status post coronary artery stent placement 2015    LCx stent 15      Past Surgical History:   Procedure Laterality Date     SECTION,  CLASSIC      x3    COLONOSCOPY N/A 1/19/2023    Procedure: COLONOSCOPY;  Surgeon: Trung Strickland MD;  Location: [facility];  Service: Endoscopy;  Laterality: N/A;    CORONARY STENT PLACEMENT      D&C Hysteroscopy      right arm surgery  2009    TUBAL LIGATION       Family History   Problem Relation Name Age of Onset    Hypertension Mother      Diabetes Maternal Aunt      Hypertension Maternal Aunt      Cancer Neg Hx      Heart disease Neg Hx       Social History[1]  Review of Systems    Physical Exam     Initial Vitals [02/16/25 2120]   BP Pulse Resp Temp SpO2   (!) 224/130 88 17 -- 100 %      MAP       --         Physical Exam    Vitals reviewed.  Constitutional: She appears well-developed and well-nourished. She is not diaphoretic. No distress.   HENT:   Head: Normocephalic and atraumatic.   Right Ear: External ear normal.   Left Ear: External ear normal.   Nose: Nose normal.   Eyes: Conjunctivae and EOM are normal. Right eye exhibits no discharge. Left eye exhibits no discharge.   Neck:   Normal range of motion.  Cardiovascular:  Normal rate, regular rhythm and normal heart sounds.     Exam reveals no gallop and no friction rub.       No murmur heard.  Pulmonary/Chest: Breath sounds normal. No respiratory distress. She has no wheezes. She has no rhonchi. She has no rales.   Abdominal: Abdomen is soft. She exhibits no distension. There is no abdominal tenderness. There is no rebound and no guarding.   Musculoskeletal:         General: Normal range of motion.      Cervical back: Normal range of motion.      Comments: No CVA tenderness to palpation.     Neurological: She is alert and oriented to person, place, and time.         ED Course   Procedures  Labs Reviewed   URINALYSIS, REFLEX TO URINE CULTURE - Abnormal       Result Value    Specimen UA Urine, Clean Catch      Color, UA Yellow      Appearance, UA Clear      pH, UA 6.0      Specific Gravity, UA 1.025      Protein, UA Trace (*)     Glucose, UA  Negative      Ketones, UA Negative      Bilirubin (UA) Negative      Occult Blood UA Negative      Nitrite, UA Negative      Urobilinogen, UA Negative      Leukocytes, UA Negative      Narrative:     Specimen Source->Urine   BASIC METABOLIC PANEL    Sodium 141      Potassium 3.8      Chloride 109      CO2 23      Glucose 80      BUN 12      Creatinine 0.7      Calcium 9.3      Anion Gap 9      eGFR >60     POCT URINE PREGNANCY    POC Preg Test, Ur Negative       Acceptable Yes            Imaging Results              CT Renal Stone Study ABD Pelvis WO (Final result)  Result time 02/16/25 23:19:22      Final result by Liseth Stephen MD (02/16/25 23:19:22)                   Impression:      No nephrolithiasis or hydronephrosis.    Uterine leiomyoma.    Trace right pleural effusion with associated compressive atelectasis.      Electronically signed by: Liseth Stephen  Date:    02/16/2025  Time:    23:19               Narrative:    EXAMINATION:  CT RENAL STONE STUDY ABDOMEN PELVIS WITHOUT    CLINICAL HISTORY:  Reproducible right-sided flank pain that started last night.    TECHNIQUE:  5 mm unenhanced axial images from the lung bases through the greater trochanters were performed.  Coronal and sagittal reformatted images were provided.    COMPARISON:  None.    FINDINGS:  Within the limits of a noncontrast examination, the liver, spleen, pancreas, and adrenal glands are unremarkable.  The gallbladder contains no calcified gallstones.    There is no nephrolithiasis or hydronephrosis.    There is no gross abdominal adenopathy or ascites.  There is a tiny fat containing umbilical hernia.  Mild atherosclerotic changes are present.    Uterus is prominent and contains at least uterine fibroid.  Numerous displaces the urinary bladder anteriorly.  There is trace free pelvic fluid.  The appendix is not inflamed.    At the lung bases, there is trace pleural effusion with associated compressive atelectasis.   There is mild left basilar compressive atelectasis.                                       Medications   LIDOcaine 5 % patch 1 patch (1 patch Transdermal Patch Applied 2/16/25 2222)   ketorolac injection 9.999 mg (9.999 mg Intravenous Given 2/16/25 2240)   0.9% NaCl infusion (1,000 mLs Intravenous New Bag 2/16/25 2235)     Medical Decision Making  54-year-old female presents for evaluation of right-sided low back pain that has been intermittent yesterday.  Differential diagnosis includes renal colic, muscle spasm, muscle strain, UTI.  Will obtain CT of the abdomen pelvis without contrast as well as urinalysis and chemistry, and she will be given Toradol and IV fluids as well as lidocaine patch.    11:58 PM  On re-evaluation the patient reports improvement in her pain after the lidocaine patch and Toradol.    Urinalysis shows no evidence of a UTI or hematuria.  Chemistry within normal limits.  CT of the abdomen pelvis significant for trace right pleural effusion but no other acute findings.  The patient has no respiratory symptoms this time.  I have discussed with her the plan to have her follow up with her PCP for further evaluation of the pleural effusion as needed.  I also discussed with the patient the plan to treat her symptoms as musculoskeletal in nature with a prescription for muscle relaxers and NSAIDs.  I have given her strict precautions for seeking immediate re-evaluation including any new or worsening pain or development of other symptoms.  The patient verbalized understanding and agreement and will be discharged at this time stable condition.    Amount and/or Complexity of Data Reviewed  Labs: ordered.  Radiology: ordered.    Risk  Prescription drug management.                                      Clinical Impression:  Final diagnoses:  [M54.50] Acute right-sided low back pain without sciatica (Primary)          ED Disposition Condition    Discharge Stable          ED Prescriptions       Medication Sig  Dispense Start Date End Date Auth. Provider    LIDOcaine (LIDODERM) 5 % Place 1 patch onto the skin once daily. Remove & Discard patch within 12 hours or as directed by MD for 7 days 7 patch 2/17/2025 2/24/2025 Antonella Murphy MD    methocarbamoL (ROBAXIN) 500 MG Tab Take 2 tablets (1,000 mg total) by mouth 3 (three) times daily. for 5 days 30 tablet 2/17/2025 2/22/2025 Antonella Murphy MD    ibuprofen (ADVIL,MOTRIN) 800 MG tablet Take 1 tablet (800 mg total) by mouth every 8 (eight) hours as needed for Pain. 15 tablet 2/17/2025 2/22/2025 Antonella Murphy MD          Follow-up Information       Follow up With Specialties Details Why Contact Info    Marta Louis MD Family Medicine Schedule an appointment as soon as possible for a visit on 2/21/2025 For re-evaluation if symptoms persist [address]  [address]  [facility] [address] [phone]      [facility] Emergency Dept Emergency Medicine Go to  If symptoms worsen - any new or worsening pain or if you develop any other symptoms [address] [facility] [address] [phone]                 [1]   Social History  Tobacco Use    Smoking status: Never     Passive exposure: Never    Smokeless tobacco: Never   Substance Use Topics    Alcohol use: Yes     Alcohol/week: 2.0 standard drinks of alcohol     Types: 2 Glasses of wine per week     Comment: social    Drug use: No        Antonella Murphy MD  02/17/25 0002

## 2025-02-17 NOTE — TELEPHONE ENCOUNTER
Called pt and explained that Dr. Louis would need to see her for an appointment. Pt wanted to be seen asap. We do not have any appointments available until next Monday. Pt asked to be scheduled at [facility]. I could not find the location to schedule appointment. Gave main scheduling number to call and get an appointment

## 2025-02-17 NOTE — TELEPHONE ENCOUNTER
----- Message from Machelle sent at 2/17/2025 11:25 AM CST -----  Type:  Needs Medical AdviceWho Called: Pt Symptoms (please be specific): recommendations on who she can see for pain  How long has patient had these symptoms:  Yesterday Would the patient rather a call back or a response via [employer]? Call back Best Call Back Number: [phone] Information: Please be advised, pt states that she had an ED visit yesterday and instead of doing a F/U w/ PCP as she was told, pt wants to see if  can give her suggestions on who see can see for issue

## 2025-02-17 NOTE — ED NOTES
Lizet Andrea, an 54 y.o. female presents to the ED via POV accompanied by Family  [id number] p/w/d ambulated to assigned room 15 at her own accord without issue.  C/O R Flank pain since last night  Denies any dysuria/hematuria/foul odor/stones    *Reports that her neck is also hurting as well bc she recently changed her sleeping position.  *Pain worsens upon ambulation or movement of arms.      Chief Complaint   Patient presents with    Flank Pain     Reproducible R sided flank pain that started last night. Recently changed sleeping position s/t pain in neck after sleeping funny. Pain is worse with ambulation, position changes, movement of arms. HX HTN, compliant with home BP meds.      Review of patient's allergies indicates:   Allergen Reactions    Crab Rash    Shrimp Rash     Past Medical History:   Diagnosis Date    Accelerated hypertension 1/1/2015    Allergic rhinitis 1/3/2015    Coronary artery disease 7/2/2016    Coronary artery disease involving native coronary artery of native heart without angina pectoris 7/2/2016    Hepatitis C antibody test positive 5/4/2016    Negative HCV RNA 05/2016, will check again in 3 months but no detectable HCV virus HCV AB will likely remain positive for life    History of non-ST elevation myocardial infarction (NSTEMI) 7/2/2016    Menorrhagia     Obesity (BMI 30-39.9) 1/2/2015    Prediabetes 3/14/2018    Status post coronary artery stent placement 1/19/2015    LCx stent 1/12015

## 2025-02-17 NOTE — ED NOTES
Spoke with patient about hx of HTN. Patient stated that she takes HTN meds twice a day but only took it once today, right before she came to ER.

## 2025-02-19 ENCOUNTER — OFFICE VISIT (OUTPATIENT)
Dept: PAIN MEDICINE | Facility: CLINIC | Age: 55
End: 2025-02-19
Payer: COMMERCIAL

## 2025-02-19 ENCOUNTER — TELEPHONE (OUTPATIENT)
Dept: PAIN MEDICINE | Facility: CLINIC | Age: 55
End: 2025-02-19

## 2025-02-19 VITALS
SYSTOLIC BLOOD PRESSURE: 162 MMHG | DIASTOLIC BLOOD PRESSURE: 107 MMHG | WEIGHT: 198.19 LBS | HEART RATE: 84 BPM | HEIGHT: 70 IN | BODY MASS INDEX: 28.37 KG/M2 | RESPIRATION RATE: 18 BRPM

## 2025-02-19 DIAGNOSIS — R10.9 RIGHT FLANK PAIN: ICD-10-CM

## 2025-02-19 DIAGNOSIS — J90 PLEURISY WITH EFFUSION: Primary | ICD-10-CM

## 2025-02-19 RX ORDER — METHOCARBAMOL 500 MG/1
1000 TABLET, FILM COATED ORAL 2 TIMES DAILY PRN
Qty: 60 TABLET | Refills: 1 | Status: SHIPPED | OUTPATIENT
Start: 2025-02-19 | End: 2025-02-19

## 2025-02-19 RX ORDER — METHOCARBAMOL 500 MG/1
1000 TABLET, FILM COATED ORAL 2 TIMES DAILY PRN
Qty: 60 TABLET | Refills: 1 | Status: SHIPPED | OUTPATIENT
Start: 2025-02-19 | End: 2025-03-21

## 2025-02-19 NOTE — LETTER
February 19, 2025      [facility]  [address]  [facility] [address]  Phone: [phone]  Fax: [phone]       Patient: Lizet Andrea   YOB: 1970  Date of Visit: 02/19/2025    To Whom It May Concern:    Rohan Andrea  was at [facility] on 02/19/2025. The patient may return to work/school on 2/18/2025 with no restrictions. If you have any questions or concerns, or if I can be of further assistance, please do not hesitate to contact me.    Sincerely,    Kenisha Jennings CMA

## 2025-02-19 NOTE — PROGRESS NOTES
PCP: Marta oLuis MD    REFERRING PHYSICIAN: Reanna Calderon    CHIEF COMPLAINT: Right flank     Original HISTORY OF PRESENT ILLNESS: Lizet Andrea presents to the clinic for the evaluation of the above pain. The pain started 4-5 days ago.    Original Pain Description:  The pain is located in the right flank that started on Saturday night that gradually came on without any radiation to the back or the right leg. The pain is described as sharp, shooting, and stabbing. Exacerbating factors: Sitting, Standing, and Walking. Mitigating factors heat and medications (Motrin and Robaxin are helpful). Symptoms interfere with daily activity, sleeping, and work. The patient feels like symptoms have been . Patient denies night fever/night sweats, urinary incontinence, bowel incontinence, significant weight loss, significant motor weakness, and loss of sensations.    Original PAIN SCORES:  Best: Pain is 6  Worst: Pain is 10  Current: Pain is 7        2/19/2025     3:35 PM   Last 3 PDI Scores   Pain Disability Index (PDI) 0     INTERVAL HISTORY: (Newest visit at the bottom)     6 weeks of Conservative therapy:  PT: No   Chiro: No   HEP: No     Treatments / Medications: (Ice/Heat/NSAIDS/APAP/etc):  800 mg Motrin   500 mg Robaxin   Lidoderm 5%     Interventional Pain Procedures: (Previous injections): No     Past Medical History:   Diagnosis Date    Accelerated hypertension 1/1/2015    Allergic rhinitis 1/3/2015    Coronary artery disease 7/2/2016    Coronary artery disease involving native coronary artery of native heart without angina pectoris 7/2/2016    Hepatitis C antibody test positive 5/4/2016    Negative HCV RNA 05/2016, will check again in 3 months but no detectable HCV virus HCV AB will likely remain positive for life    History of non-ST elevation myocardial infarction (NSTEMI) 7/2/2016    Menorrhagia     Obesity (BMI 30-39.9) 1/2/2015    Prediabetes 3/14/2018    Status post coronary artery stent  placement 2015    LCx stent       Past Surgical History:   Procedure Laterality Date     SECTION, CLASSIC      x3    COLONOSCOPY N/A 2023    Procedure: COLONOSCOPY;  Surgeon: Trung Strickland MD;  Location: [facility];  Service: Endoscopy;  Laterality: N/A;    CORONARY STENT PLACEMENT      D&C Hysteroscopy      right arm surgery      TUBAL LIGATION       Social History[1]  Family History   Problem Relation Name Age of Onset    Hypertension Mother      Diabetes Maternal Aunt      Hypertension Maternal Aunt      Cancer Neg Hx      Heart disease Neg Hx       Review of patient's allergies indicates:   Allergen Reactions    Crab Rash    Shrimp Rash     Current Outpatient Medications   Medication Sig    amLODIPine (NORVASC) 10 MG tablet Take 1 tablet (10 mg total) by mouth once daily.    aspirin 81 MG Chew Take 81 mg by mouth once daily.    atorvastatin (LIPITOR) 80 MG tablet Take 1 tablet (80 mg total) by mouth once daily.    carvediloL (COREG) 25 MG tablet TAKE 1 TABLET BY MOUTH TWICE DAILY WITH MEAL    cholecalciferol, vitamin D3, 125 mcg (5,000 unit) capsule Take 1 capsule (5,000 Units total) by mouth daily with breakfast.    clindamycin (CLEOCIN T) 1 % lotion Use on face hs    doxycycline (VIBRA-TABS) 100 MG tablet Take one qd with food    ezetimibe (ZETIA) 10 mg tablet Take 1 tablet (10 mg total) by mouth once daily.    fluticasone propionate (FLONASE) 50 mcg/actuation nasal spray 1 spray (50 mcg total) by Each Nostril route once daily.    ibuprofen (ADVIL,MOTRIN) 800 MG tablet Take 1 tablet (800 mg total) by mouth every 8 (eight) hours as needed for Pain.    ketoconazole (NIZORAL) 2 % cream Apply topically 2 (two) times daily. Prn facial rash or dryness    levocetirizine (XYZAL) 5 MG tablet Take 1 tablet (5 mg total) by mouth every evening.    LIDOcaine (LIDODERM) 5 % Place 1 patch onto the skin once daily. Remove & Discard patch within 12 hours or as directed by MD for 7 days     "losartan-hydrochlorothiazide 100-25 mg (HYZAAR) 100-25 mg per tablet Take 1 tablet by mouth once daily.    metFORMIN (GLUCOPHAGE-XR) 500 MG ER 24hr tablet Take 1 tablet (500 mg total) by mouth daily with dinner or evening meal.    nitroGLYCERIN (NITROSTAT) 0.4 MG SL tablet Place 1 tablet (0.4 mg total) under the tongue every 5 (five) minutes as needed for Chest pain.    potassium chloride (K-TAB) 20 mEq Take 1 tablet (20 mEq total) by mouth once daily.    semaglutide, weight loss, (WEGOVY) 1.7 mg/0.75 mL PnIj Inject 1.7 mg into the skin every 7 days.    TRIAMCINOLONE ACETONIDE TOP     methocarbamoL (ROBAXIN) 500 MG Tab Take 2 tablets (1,000 mg total) by mouth 2 (two) times daily as needed (pain and spasms).     No current facility-administered medications for this visit.     ROS:  GENERAL: No fever. No chills. No fatigue. Denies weight loss. Denies weight gain.  HEENT: Denies headaches. Denies vision change. Denies eye pain. Denies double vision. Denies ear pain.   CV: Denies chest pain.   PULM: Denies of shortness of breath.  GI: Denies constipation. No diarrhea. No abdominal pain. Denies nausea. Denies vomiting. No blood in stool.  HEME: Denies bleeding problems.  : Denies urgency. No painful urination. No blood in urine.  MS: Denies joint stiffness. Denies joint swelling.  Denies back pain.  SKIN: Denies rash.   NEURO: Denies seizures. No weakness.  PSYCH:  Denies difficulty sleeping. No anxiety. Denies depression. No suicidal thoughts.     VITALS:   Vitals:    02/19/25 1531   BP: (!) 162/107   Pulse: 84   Resp: 18   Weight: 89.9 kg (198 lb 3.1 oz)   Height: 5' 10" (1.778 m)   PainSc:   7   PainLoc: Abdomen     PHYSICAL EXAM:   GENERAL: Well appearing, in no acute distress, alert and oriented x3.  PSYCH:  Mood and affect appropriate.  SKIN: Skin color, texture, turgor normal, no rashes or lesions.  HEENT:  Normocephalic, atraumatic. Cranial nerves grossly intact.  NECK: No pain to palpation over the cervical " paraspinous muscles. No pain to palpation over facets. No pain with neck flexion, extension, or lateral flexion.   PULM: No evidence of respiratory difficulty, symmetric chest rise.  GI:  Non-distended  BACK: Normal range of motion. No pain to palpation over the spinous processes. No pain to palpation over facet joints. There is no pain with palpation over the sacroiliac joints bilaterally.   EXTREMITIES: No deformities, edema, or skin discoloration.   MUSCULOSKELETAL: Shoulder, hip, and knee provocative maneuvers are negative. No atrophy is noted.  NEURO: Sensation is equal and appropriate bilaterally. Bilateral upper and lower extremity strength is normal and symmetric. Bilateral upper and lower extremity coordination and muscle stretch reflexes are physiologic and symmetric. Plantar response are downgoing. Straight leg raising in the supine position is negative to radicular pain.   GAIT: normal.    LABS:    IMAGING:    CT RENAL STONE STUDY ABDOMEN PELVIS WITHOUT     FINDINGS:  Within the limits of a noncontrast examination, the liver, spleen, pancreas, and adrenal glands are unremarkable.  The gallbladder contains no calcified gallstones.     There is no nephrolithiasis or hydronephrosis.     There is no gross abdominal adenopathy or ascites.  There is a tiny fat containing umbilical hernia.  Mild atherosclerotic changes are present.     Uterus is prominent and contains at least uterine fibroid.  Numerous displaces the urinary bladder anteriorly.  There is trace free pelvic fluid.  The appendix is not inflamed.     At the lung bases, there is trace pleural effusion with associated compressive atelectasis.  There is mild left basilar compressive atelectasis.     Impression:     No nephrolithiasis or hydronephrosis.     Uterine leiomyoma.     Trace right pleural effusion with associated compressive atelectasis.    ASSESSMENT: 54 y.o. year old female with pain, consistent with:    Encounter Diagnoses   Name Primary?     Right flank pain     Pleurisy with effusion Yes     DISCUSSION: Lizet Andrea is a very nice lady who is finishing up her Master's degree at [employer]. She presents with right side flank pain without inciting event. Patient presented to the ED and underwent CT abdomen pelvis which showed a trace pleural effusion on the right. Pain was not reproducible on exam of the area but she appears to be uncomfortable with bending/twisting movements.      PLAN:  Reviewed ED notes  CT reviewed and discussed with patient, suspect this is from pleural effusion causing pleuritic pain  Referral to physical therapy.   Take medication at scheduled times for 1-2 weeks: 800 mg Motrin Q8 hours, 1000 mg Robaxin BID and Lidoderm 5% patches to control symptoms  Agree that she should follow up with PCP to determine source of effusion  Follow up with me as needed    Sadi Merino MD PGY-5  Interventional Pain Medicine Fellow   [facility]            [1]   Social History  Socioeconomic History    Marital status:    Tobacco Use    Smoking status: Never     Passive exposure: Never    Smokeless tobacco: Never   Substance and Sexual Activity    Alcohol use: Yes     Alcohol/week: 2.0 standard drinks of alcohol     Types: 2 Glasses of wine per week     Comment: social    Drug use: No    Sexual activity: Yes     Partners: Male     Birth control/protection: Surgical     Social Drivers of Health     Financial Resource Strain: Low Risk  (12/26/2024)    Overall Financial Resource Strain (CARDIA)     Difficulty of Paying Living Expenses: Not very hard   Food Insecurity: No Food Insecurity (12/26/2024)    Hunger Vital Sign     Worried About Running Out of Food in the Last Year: Never true     Ran Out of Food in the Last Year: Never true   Physical Activity: Sufficiently Active (12/26/2024)    Exercise Vital Sign     Days of Exercise per Week: 3 days     Minutes of Exercise per Session: 60 min   Stress: No Stress Concern  Present (12/26/2024)    [facility] [employer] - Occupational Stress Questionnaire     Feeling of Stress : Not at all   Housing Stability: Unknown (12/26/2024)    Housing Stability Vital Sign     Unable to Pay for Housing in the Last Year: No

## 2025-02-20 ENCOUNTER — TELEPHONE (OUTPATIENT)
Dept: INTERNAL MEDICINE | Facility: CLINIC | Age: 55
End: 2025-02-20
Payer: COMMERCIAL

## 2025-02-20 NOTE — TELEPHONE ENCOUNTER
----- Message from Marla sent at 2/19/2025  4:29 PM CST -----  Type:  Sooner Apoointment RequestCaller is requesting a sooner appointment.  Caller declined first available appointment listed below.  Caller will not accept being placed on the waitlist and is requesting a message be sent to doctor.Name of Caller:pt When is the first available appointment?books are closed Symptoms:f/u from urgent care and pain management Would the patient rather a call back or a response via [facility]? Call Best Call Back Number:[phone] Information:

## 2025-02-21 ENCOUNTER — TELEPHONE (OUTPATIENT)
Dept: INTERNAL MEDICINE | Facility: CLINIC | Age: 55
End: 2025-02-21
Payer: COMMERCIAL

## 2025-02-21 NOTE — TELEPHONE ENCOUNTER
----- Message from Guillermo sent at 2/21/2025  8:16 AM CST -----  Contact: pt  Type:  Sooner Apoointment RequestCaller is requesting a sooner appointment.  Caller declined first available appointment listed below.  Caller will not accept being placed on the waitlist and is requesting a message be sent to doctor.Name of Caller:ptWhen is the first available appointment? April Symptoms: hosp f/u Would the patient rather a call back or a response via [employer]? Call Best Call Back Number:[phone] Additional Information:

## 2025-02-23 NOTE — PROGRESS NOTES
Office Visit    Patient Name: Lizet Andrea    : 1970  MRN: [id number]    Subjective:  Lizet is a 54 y.o. female who presents today for:    Follow-up (er)    Most recent OV w/ me 24    55 yo patient of mine with CAD w/ h/o stent/NSTEMI, HTN, prediabetes, HLD, and obesity currently being treated with Wegovy who presents today for re-evaluation of R sided flank pain previously evaluated in the ED and then subsequently at pain management appointment.    Per pain management note 25: presents with right side flank pain without inciting event. Patient presented to the ED 25 and underwent CT abdomen pelvis which showed a trace pleural effusion on the right. Pain was not reproducible on exam of the area but she appears to be uncomfortable with bending/twisting movements.      UA and BMP in ED 25 unremarkable with EGFR >60.     CT renal stone study 25  No nephrolithiasis or hydronephrosis.   Uterine leiomyoma.   Trace right pleural effusion with associated compressive atelectasis.        PLAN:  Reviewed ED notes  CT reviewed and discussed with patient, suspect this is from pleural effusion causing pleuritic pain  Referral to physical therapy.   Take medication at scheduled times for 1-2 weeks: 800 mg Motrin Q8 hours, 1000 mg Robaxin BID and Lidoderm 5% patches to control symptoms  Agree that she should follow up with PCP to determine source of effusion  Follow up with me as needed     She has been taking the Motrin/ Robaxin/ Lidoderm patches and reports potentially some improvement from the medications and then with time her pain has just generally improved.  As a whole her pain level is overall improved significantly since presentation.   No current chest pain or pleuritic pain.   Today feels like she could start exercising.     Never smoker. No shortness of breath or cough. Pain severe with sneezing.     Apart from the R flank pain she has been feeling in her usual state of health. No  joint pains, rashes, headaches, visual changes, no new cough, no night sweats/malaise/f/c/n/v.   No dyspnea.     Recent environmental exposures/ smoking history: NONE    8/13/24 ECHO    Left Ventricle: The left ventricle is normal in size. Normal wall thickness. There is low normal systolic function with a visually estimated ejection fraction of 50 - 55%. Biplane (2D) method of discs ejection fraction is 61%. There is normal diastolic function.    Right Ventricle: Normal right ventricular cavity size. Systolic function is normal.    Aortic Valve: The aortic valve is a trileaflet valve.    Pulmonary Artery: The estimated pulmonary artery systolic pressure is 15 mmHg.    IVC/SVC: Normal venous pressure at 3 mmHg.       PAST MEDICAL HISTORY, SURGICAL/SOCIAL/FAMILY HISTORY REVIEWED AS PER CHART, WITH PERTINENT FINDINGS INCLUDED IN HISTORY SECTION OF NOTE.     Current Medications    Medication List with Changes/Refills   Current Medications    AMLODIPINE (NORVASC) 10 MG TABLET    Take 1 tablet (10 mg total) by mouth once daily.    ASPIRIN 81 MG CHEW    Take 81 mg by mouth once daily.    ATORVASTATIN (LIPITOR) 80 MG TABLET    Take 1 tablet (80 mg total) by mouth once daily.    CARVEDILOL (COREG) 25 MG TABLET    TAKE 1 TABLET BY MOUTH TWICE DAILY WITH MEAL    CHOLECALCIFEROL, VITAMIN D3, 125 MCG (5,000 UNIT) CAPSULE    Take 1 capsule (5,000 Units total) by mouth daily with breakfast.    CLINDAMYCIN (CLEOCIN T) 1 % LOTION    Use on face hs    DOXYCYCLINE (VIBRA-TABS) 100 MG TABLET    Take one qd with food    EZETIMIBE (ZETIA) 10 MG TABLET    Take 1 tablet (10 mg total) by mouth once daily.    FLUTICASONE PROPIONATE (FLONASE) 50 MCG/ACTUATION NASAL SPRAY    1 spray (50 mcg total) by Each Nostril route once daily.    KETOCONAZOLE (NIZORAL) 2 % CREAM    Apply topically 2 (two) times daily. Prn facial rash or dryness    LEVOCETIRIZINE (XYZAL) 5 MG TABLET    Take 1 tablet (5 mg total) by mouth every evening.    LIDOCAINE  "(LIDODERM) 5 %    Place 1 patch onto the skin once daily. Remove & Discard patch within 12 hours or as directed by MD for 7 days    LOSARTAN-HYDROCHLOROTHIAZIDE 100-25 MG (HYZAAR) 100-25 MG PER TABLET    Take 1 tablet by mouth once daily.    METFORMIN (GLUCOPHAGE-XR) 500 MG ER 24HR TABLET    Take 1 tablet (500 mg total) by mouth daily with dinner or evening meal.    METHOCARBAMOL (ROBAXIN) 500 MG TAB    Take 2 tablets (1,000 mg total) by mouth 2 (two) times daily as needed (pain and spasms).    NITROGLYCERIN (NITROSTAT) 0.4 MG SL TABLET    Place 1 tablet (0.4 mg total) under the tongue every 5 (five) minutes as needed for Chest pain.    POTASSIUM CHLORIDE (K-TAB) 20 MEQ    Take 1 tablet (20 mEq total) by mouth once daily.    SEMAGLUTIDE, WEIGHT LOSS, (WEGOVY) 1.7 MG/0.75 ML PNIJ    Inject 1.7 mg into the skin every 7 days.    TRIAMCINOLONE ACETONIDE TOP           Allergies   Review of patient's allergies indicates:   Allergen Reactions    Crab Rash    Shrimp Rash         Review of Systems (Pertinent positives)  Review of Systems   Constitutional:  Negative for chills, fatigue and fever.   HENT:  Negative for congestion, sinus pressure and sore throat.    Respiratory:  Negative for cough and shortness of breath.    Cardiovascular:  Negative for chest pain, palpitations and leg swelling.   Gastrointestinal:  Negative for nausea and vomiting.   Musculoskeletal:  Positive for back pain. Negative for arthralgias and joint swelling.   Skin:  Negative for rash.   Allergic/Immunologic: Negative for environmental allergies.   Neurological:  Negative for dizziness.       BP (!) 154/102 (BP Location: Left arm, Patient Position: Sitting)   Pulse 82   Temp 98.3 °F (36.8 °C)   Ht 5' 10" (1.778 m)   Wt 90.9 kg (200 lb 6.4 oz)   LMP 01/20/2025 (Exact Date)   SpO2 98%   BMI 28.75 kg/m²     Physical Exam  Vitals reviewed.   Constitutional:       General: She is not in acute distress.     Appearance: Normal appearance. She is " well-developed.   HENT:      Head: Normocephalic and atraumatic.      Right Ear: Tympanic membrane normal.      Left Ear: Tympanic membrane normal.      Nose: No congestion or rhinorrhea.      Mouth/Throat:      Pharynx: No oropharyngeal exudate or posterior oropharyngeal erythema.   Eyes:      Conjunctiva/sclera: Conjunctivae normal.   Cardiovascular:      Rate and Rhythm: Normal rate and regular rhythm.   Pulmonary:      Effort: Pulmonary effort is normal.      Breath sounds: Examination of the right-lower field reveals decreased breath sounds. Decreased breath sounds present. No wheezing, rhonchi or rales.   Chest:      Chest wall: No tenderness.   Abdominal:      Palpations: Abdomen is soft.   Musculoskeletal:      Right lower leg: No edema.      Left lower leg: No edema.   Skin:     General: Skin is warm and dry.   Neurological:      General: No focal deficit present.      Mental Status: She is alert and oriented to person, place, and time.   Psychiatric:         Mood and Affect: Mood normal.         Behavior: Behavior normal.           Assessment/Plan:  Lizet Andrea is a 54 y.o. female who presents today for :        ICD-10-CM ICD-9-CM    1. Right flank pain  R10.9 789.09       2. Pleural effusion, right  J90 511.9 CT Chest W Wo Contrast      Comprehensive Metabolic Panel      CBC Auto Differential      TSH      TONE Screen w/Reflex      C-Reactive Protein      Sedimentation rate      Ambulatory referral/consult to Pulmonology      3. History of non-ST elevation myocardial infarction (NSTEMI)  I25.2 412       4. Accelerated hypertension  I10 401.0 MYC E-Visit      5. Uterine leiomyoma, unspecified location  D25.9 218.9 US Pelvis Complete Non OB      6. Prediabetes  R73.03 790.29       7. Overweight (BMI 25.0-29.9)  E66.3 278.02         RIGHT FLANK PAIN THOUGHT TO BE SECONDARY TO RIGHT-SIDED PLEURAL EFFUSION:  Previously evaluated in the ER in by pain management-notes reviewed as per HPI.  Her pain is  improving with taking ibuprofen Robaxin muscle relaxer and using Lidoderm patches.  She does not have any associated symptoms of concern-no new constitutional symptoms including malaise/night sweats/unintentional weight loss.  No new joint pain/swelling or shortness for breath/chest pain.      Etiology of the effusion is unclear.  Will investigate further with CT of the chest with and without contrast to evaluate for inflammatory, malignant, infectious processes, though there is no obvious concern for these on her history or exam.  Also check labs including ESR/CRP along with updated chemistries/blood count/TSH.  Will have her schedule with Pulmonary for follow-up if it is indicated based on results but if not she can fix all appointment.      NSTEMI, ACCELERATED HYPERTENSION:  Has not been taking her blood pressure medications consistently or monitoring will add pressure.  Counseled extensively on the importance of compliance with her cardiac medications, aspirin and statin.  She will receive an E visit in 10 days for completion to follow-up closely on her elevated blood pressures.      OVERWEIGHT BMI, PREDIABETES:  Has lost weight with help of Wegovy.  Has upcoming appointment for routine labs and monitoring for follow-up.  A1c did improve out of prediabetic range with we go be and was 5.3 on 12/06/2024.    UTERINE FIBROIDS:  Noted on CT renal stone study performed in the ER-advise a pelvic ultrasound for further evaluation.    A total of 45  minutes was spent on this encounter that included explaining differentials, symptom counseling, review of recent results, and next steps of diagnosis and management plan, along with direct documentation of the encounter.    Visit today included increased complexity associated with the care of the episodic problem NEW RIGHT SIDED PLEURAL EFFUSION addressed and managing the longitudinal care of the patient due to the serious and/or complex managed problem(s) REVIEW OF MULTIPLE  ENCOUNTERS LEADING UP TO TODAY'S ENCOUNTER FOR EVALUATION OF NEW ONSET PLEURAL EFFUSION.  MULTIPLE MEDICAL COMORBIDITIES CONTRIBUTE TO HER OVERALL COMPLEXITY-HISTORY OF MYOCARDIAL INFARCTION, UNCONTROLLED HYPERTENSION, PREDIABETES.    There are no Patient Instructions on file for this visit.      Follow up for to review results of diagnostic procedure, to follow up on lab results.

## 2025-02-24 ENCOUNTER — OFFICE VISIT (OUTPATIENT)
Dept: INTERNAL MEDICINE | Facility: CLINIC | Age: 55
End: 2025-02-24
Payer: OTHER GOVERNMENT

## 2025-02-24 ENCOUNTER — PATIENT MESSAGE (OUTPATIENT)
Dept: INTERNAL MEDICINE | Facility: CLINIC | Age: 55
End: 2025-02-24

## 2025-02-24 ENCOUNTER — LAB VISIT (OUTPATIENT)
Dept: LAB | Facility: HOSPITAL | Age: 55
End: 2025-02-24
Attending: FAMILY MEDICINE
Payer: OTHER GOVERNMENT

## 2025-02-24 VITALS
BODY MASS INDEX: 28.69 KG/M2 | OXYGEN SATURATION: 98 % | DIASTOLIC BLOOD PRESSURE: 102 MMHG | HEART RATE: 82 BPM | SYSTOLIC BLOOD PRESSURE: 154 MMHG | HEIGHT: 70 IN | WEIGHT: 200.38 LBS | TEMPERATURE: 98 F

## 2025-02-24 DIAGNOSIS — I25.2 HISTORY OF NON-ST ELEVATION MYOCARDIAL INFARCTION (NSTEMI): ICD-10-CM

## 2025-02-24 DIAGNOSIS — I10 ACCELERATED HYPERTENSION: ICD-10-CM

## 2025-02-24 DIAGNOSIS — E66.3 OVERWEIGHT (BMI 25.0-29.9): ICD-10-CM

## 2025-02-24 DIAGNOSIS — J90 PLEURAL EFFUSION, RIGHT: ICD-10-CM

## 2025-02-24 DIAGNOSIS — D25.9 UTERINE LEIOMYOMA, UNSPECIFIED LOCATION: ICD-10-CM

## 2025-02-24 DIAGNOSIS — R73.03 PREDIABETES: ICD-10-CM

## 2025-02-24 DIAGNOSIS — R10.9 RIGHT FLANK PAIN: Primary | ICD-10-CM

## 2025-02-24 LAB — ERYTHROCYTE [SEDIMENTATION RATE] IN BLOOD BY PHOTOMETRIC METHOD: 14 MM/HR (ref 0–36)

## 2025-02-24 PROCEDURE — 84443 ASSAY THYROID STIM HORMONE: CPT | Performed by: FAMILY MEDICINE

## 2025-02-24 PROCEDURE — 80053 COMPREHEN METABOLIC PANEL: CPT | Performed by: FAMILY MEDICINE

## 2025-02-24 PROCEDURE — 86140 C-REACTIVE PROTEIN: CPT | Performed by: FAMILY MEDICINE

## 2025-02-24 PROCEDURE — 99215 OFFICE O/P EST HI 40 MIN: CPT | Mod: S$PBB,,, | Performed by: FAMILY MEDICINE

## 2025-02-24 PROCEDURE — 85652 RBC SED RATE AUTOMATED: CPT | Performed by: FAMILY MEDICINE

## 2025-02-24 PROCEDURE — 36415 COLL VENOUS BLD VENIPUNCTURE: CPT | Performed by: FAMILY MEDICINE

## 2025-02-24 PROCEDURE — 99999 PR PBB SHADOW E&M-EST. PATIENT-LVL V: CPT | Mod: PBBFAC,,, | Performed by: FAMILY MEDICINE

## 2025-02-24 PROCEDURE — 86038 ANTINUCLEAR ANTIBODIES: CPT | Performed by: FAMILY MEDICINE

## 2025-02-24 PROCEDURE — 99215 OFFICE O/P EST HI 40 MIN: CPT | Mod: PBBFAC | Performed by: FAMILY MEDICINE

## 2025-02-24 PROCEDURE — 85025 COMPLETE CBC W/AUTO DIFF WBC: CPT | Performed by: FAMILY MEDICINE

## 2025-02-25 ENCOUNTER — RESULTS FOLLOW-UP (OUTPATIENT)
Dept: INTERNAL MEDICINE | Facility: CLINIC | Age: 55
End: 2025-02-25
Payer: OTHER GOVERNMENT

## 2025-02-25 DIAGNOSIS — N83.202 LEFT OVARIAN CYST: ICD-10-CM

## 2025-02-25 DIAGNOSIS — D25.9 UTERINE LEIOMYOMA, UNSPECIFIED LOCATION: Primary | ICD-10-CM

## 2025-02-25 LAB
ALBUMIN SERPL BCP-MCNC: 3.5 G/DL (ref 3.5–5.2)
ALP SERPL-CCNC: 42 U/L (ref 40–150)
ALT SERPL W/O P-5'-P-CCNC: 13 U/L (ref 10–44)
ANA SER QL IF: NORMAL
ANION GAP SERPL CALC-SCNC: 7 MMOL/L (ref 8–16)
AST SERPL-CCNC: 29 U/L (ref 10–40)
BASOPHILS # BLD AUTO: 0.04 K/UL (ref 0–0.2)
BASOPHILS NFR BLD: 1 % (ref 0–1.9)
BILIRUB SERPL-MCNC: 0.4 MG/DL (ref 0.1–1)
BUN SERPL-MCNC: 15 MG/DL (ref 6–20)
CALCIUM SERPL-MCNC: 9.1 MG/DL (ref 8.7–10.5)
CHLORIDE SERPL-SCNC: 107 MMOL/L (ref 95–110)
CO2 SERPL-SCNC: 25 MMOL/L (ref 23–29)
CREAT SERPL-MCNC: 0.8 MG/DL (ref 0.5–1.4)
CRP SERPL-MCNC: 3.4 MG/L (ref 0–8.2)
DIFFERENTIAL METHOD BLD: ABNORMAL
EOSINOPHIL # BLD AUTO: 0.1 K/UL (ref 0–0.5)
EOSINOPHIL NFR BLD: 1.8 % (ref 0–8)
ERYTHROCYTE [DISTWIDTH] IN BLOOD BY AUTOMATED COUNT: 13.1 % (ref 11.5–14.5)
EST. GFR  (NO RACE VARIABLE): >60 ML/MIN/1.73 M^2
GLUCOSE SERPL-MCNC: 75 MG/DL (ref 70–110)
HCT VFR BLD AUTO: 41.6 % (ref 37–48.5)
HGB BLD-MCNC: 13.2 G/DL (ref 12–16)
IMM GRANULOCYTES # BLD AUTO: 0.01 K/UL (ref 0–0.04)
IMM GRANULOCYTES NFR BLD AUTO: 0.3 % (ref 0–0.5)
LYMPHOCYTES # BLD AUTO: 1.7 K/UL (ref 1–4.8)
LYMPHOCYTES NFR BLD: 43.7 % (ref 18–48)
MCH RBC QN AUTO: 30.7 PG (ref 27–31)
MCHC RBC AUTO-ENTMCNC: 31.7 G/DL (ref 32–36)
MCV RBC AUTO: 97 FL (ref 82–98)
MONOCYTES # BLD AUTO: 0.5 K/UL (ref 0.3–1)
MONOCYTES NFR BLD: 12.6 % (ref 4–15)
NEUTROPHILS # BLD AUTO: 1.6 K/UL (ref 1.8–7.7)
NEUTROPHILS NFR BLD: 40.6 % (ref 38–73)
NRBC BLD-RTO: 0 /100 WBC
PLATELET # BLD AUTO: 178 K/UL (ref 150–450)
PMV BLD AUTO: 11.5 FL (ref 9.2–12.9)
POTASSIUM SERPL-SCNC: 4.1 MMOL/L (ref 3.5–5.1)
PROT SERPL-MCNC: 7 G/DL (ref 6–8.4)
RBC # BLD AUTO: 4.3 M/UL (ref 4–5.4)
SODIUM SERPL-SCNC: 139 MMOL/L (ref 136–145)
TSH SERPL DL<=0.005 MIU/L-ACNC: 0.71 UIU/ML (ref 0.4–4)
WBC # BLD AUTO: 3.82 K/UL (ref 3.9–12.7)

## 2025-02-27 ENCOUNTER — HOSPITAL ENCOUNTER (OUTPATIENT)
Dept: RADIOLOGY | Facility: HOSPITAL | Age: 55
Discharge: HOME | End: 2025-02-27
Attending: FAMILY MEDICINE
Payer: OTHER GOVERNMENT

## 2025-02-27 ENCOUNTER — TELEPHONE (OUTPATIENT)
Dept: INTERNAL MEDICINE | Facility: CLINIC | Age: 55
End: 2025-02-27
Payer: OTHER GOVERNMENT

## 2025-02-27 DIAGNOSIS — J90 PLEURAL EFFUSION ON RIGHT: ICD-10-CM

## 2025-02-27 DIAGNOSIS — D25.9 UTERINE LEIOMYOMA, UNSPECIFIED LOCATION: ICD-10-CM

## 2025-02-27 DIAGNOSIS — J90 PLEURAL EFFUSION ON RIGHT: Primary | ICD-10-CM

## 2025-02-27 PROCEDURE — 76830 TRANSVAGINAL US NON-OB: CPT | Mod: 26,,, | Performed by: RADIOLOGY

## 2025-02-27 PROCEDURE — 71046 X-RAY EXAM CHEST 2 VIEWS: CPT | Mod: TC

## 2025-02-27 PROCEDURE — 76856 US EXAM PELVIC COMPLETE: CPT | Mod: TC

## 2025-02-27 PROCEDURE — 76856 US EXAM PELVIC COMPLETE: CPT | Mod: 26,,, | Performed by: RADIOLOGY

## 2025-02-27 PROCEDURE — 71046 X-RAY EXAM CHEST 2 VIEWS: CPT | Mod: 26,,, | Performed by: RADIOLOGY

## 2025-02-27 NOTE — TELEPHONE ENCOUNTER
Please notify patient that her insurance is not covering the chest ct scan right away to further evaluate the pleural effusion noted on the prior CT of the abdomen and pelvis. Please let her know that we will start with a chest xray to see what that shows and she should be contacted to schedule the xray, thanks

## 2025-03-02 ENCOUNTER — RESULTS FOLLOW-UP (OUTPATIENT)
Dept: INTERNAL MEDICINE | Facility: CLINIC | Age: 55
End: 2025-03-02

## 2025-03-06 ENCOUNTER — HOSPITAL ENCOUNTER (OUTPATIENT)
Dept: RADIOLOGY | Facility: HOSPITAL | Age: 55
Discharge: HOME | End: 2025-03-06
Attending: FAMILY MEDICINE
Payer: OTHER GOVERNMENT

## 2025-03-06 DIAGNOSIS — J90 PLEURAL EFFUSION, RIGHT: ICD-10-CM

## 2025-03-06 PROCEDURE — 25500020 PHARM REV CODE 255: Performed by: FAMILY MEDICINE

## 2025-03-06 PROCEDURE — 71260 CT THORAX DX C+: CPT | Mod: 26,,, | Performed by: RADIOLOGY

## 2025-03-06 PROCEDURE — 71260 CT THORAX DX C+: CPT | Mod: TC

## 2025-03-06 RX ADMIN — IOHEXOL 100 ML: 350 INJECTION, SOLUTION INTRAVENOUS at 08:03

## 2025-03-07 ENCOUNTER — RESULTS FOLLOW-UP (OUTPATIENT)
Dept: INTERNAL MEDICINE | Facility: CLINIC | Age: 55
End: 2025-03-07
Payer: OTHER GOVERNMENT

## 2025-03-18 ENCOUNTER — OFFICE VISIT (OUTPATIENT)
Dept: OBSTETRICS AND GYNECOLOGY | Facility: CLINIC | Age: 55
End: 2025-03-18
Payer: OTHER GOVERNMENT

## 2025-03-18 ENCOUNTER — TELEPHONE (OUTPATIENT)
Dept: PAIN MEDICINE | Facility: CLINIC | Age: 55
End: 2025-03-18
Payer: OTHER GOVERNMENT

## 2025-03-18 ENCOUNTER — TELEPHONE (OUTPATIENT)
Dept: INTERNAL MEDICINE | Facility: CLINIC | Age: 55
End: 2025-03-18
Payer: OTHER GOVERNMENT

## 2025-03-18 VITALS
SYSTOLIC BLOOD PRESSURE: 118 MMHG | BODY MASS INDEX: 28.09 KG/M2 | WEIGHT: 195.75 LBS | DIASTOLIC BLOOD PRESSURE: 82 MMHG

## 2025-03-18 DIAGNOSIS — G89.29 CHRONIC THORACIC BACK PAIN, UNSPECIFIED BACK PAIN LATERALITY: Primary | ICD-10-CM

## 2025-03-18 DIAGNOSIS — J90 PLEURISY WITH EFFUSION: ICD-10-CM

## 2025-03-18 DIAGNOSIS — R10.9 RIGHT FLANK PAIN: Primary | ICD-10-CM

## 2025-03-18 DIAGNOSIS — M25.69 BACK STIFFNESS: ICD-10-CM

## 2025-03-18 DIAGNOSIS — M54.6 CHRONIC THORACIC BACK PAIN, UNSPECIFIED BACK PAIN LATERALITY: Primary | ICD-10-CM

## 2025-03-18 DIAGNOSIS — D25.9 UTERINE LEIOMYOMA, UNSPECIFIED LOCATION: ICD-10-CM

## 2025-03-18 DIAGNOSIS — N83.202 LEFT OVARIAN CYST: Primary | ICD-10-CM

## 2025-03-18 PROCEDURE — 99214 OFFICE O/P EST MOD 30 MIN: CPT | Mod: PBBFAC,PN | Performed by: OBSTETRICS & GYNECOLOGY

## 2025-03-18 PROCEDURE — 99203 OFFICE O/P NEW LOW 30 MIN: CPT | Mod: S$PBB,,, | Performed by: OBSTETRICS & GYNECOLOGY

## 2025-03-18 PROCEDURE — 99999 PR PBB SHADOW E&M-EST. PATIENT-LVL IV: CPT | Mod: PBBFAC,,, | Performed by: OBSTETRICS & GYNECOLOGY

## 2025-03-18 NOTE — PROGRESS NOTES
Chief Complaint: Annual exam/New Patient    Chief Complaint   Patient presents with    ultrasound follow up       HPI:   55 y.o.  here today as a new patient and to discuss recent abnormal US findings, see below. PMH includes coronary artery disease (h/o NSTEMI), HTN, and pre-diabetes. The ultrasound was performed after ER follow up, was seen on  for acute onset RLQ pain. Pain started in her back and migrated to the right side. Pain was sharp, severe in nature. Pain is completely resolved now except if she moves a certain way can feel an ache.     She has still been having menstrual cycles, usually regular like clockwork, although she believes she has not had a cycle since . She denies significant hot flashes, night sweats, or vaginal dryness. Bleeding generally monthly lasting 3-4 days. Used to bleed heavily and had a procedure done a few years ago which minimized the bleeding (hysteroscopy D&C). In the past two years has noticed more pain with menstrual cycles. Patient denies vaginal discharge, itching, irritation, odor, urinary complaints, or change in bowel habits. She is not currently sexually active, has recently come out of a 13 year relationship.     Denies FH of breast, uterine, ovarian, or colon cancer.    TVUS (25): Uterus 10.5 x 8.2 x 5.9 cm, 5 cm fibroid that previously measured 2.2 cm in 2018. EMS 6 mm. ROV wnl. LOV with 3.1 cm cyst. Normal vascular flow, no FF in pelvis.     LMP Dates from Last 1 Encounters:   LMP: 2025     Pap (2021): NILM/HPV neg  MMG (2024): BIRADS-1  Colonoscopy (2023): Repeat 10 years    Past Medical History:   Diagnosis Date    Accelerated hypertension 2015    Allergic rhinitis 1/3/2015    Coronary artery disease 2016    Coronary artery disease involving native coronary artery of native heart without angina pectoris 2016    Hepatitis C antibody test positive 2016    Negative HCV RNA 2016, will check again in 3  months but no detectable HCV virus HCV AB will likely remain positive for life    History of non-ST elevation myocardial infarction (NSTEMI) 2016    Menorrhagia     Obesity (BMI 30-39.9) 2015    Prediabetes 3/14/2018    Status post coronary artery stent placement 2015    LCx stent       Past Surgical History:   Procedure Laterality Date     SECTION, CLASSIC      x3    COLONOSCOPY N/A 2023    Procedure: COLONOSCOPY;  Surgeon: Trung Strickland MD;  Location: [facility];  Service: Endoscopy;  Laterality: N/A;    CORONARY STENT PLACEMENT      D&C Hysteroscopy      right arm surgery      TUBAL LIGATION       Current Medications[1]  Review of patient's allergies indicates:   Allergen Reactions    Crab Rash    Shrimp Rash     OB History    Para Term  AB Living   3 3 3   3   SAB IAB Ectopic Multiple Live Births       3      # Outcome Date GA Lbr Tong/2nd Weight Sex Type Anes PTL Lv   3 Term 03 40w0d   M CS-LTranv EPI  GUILHERME   2 Term 99 40w0d   F CS-LTranv EPI  GUILHERME   1 Term 96 40w0d   M CS-LTranv EPI  GUILHERME     Social History[2]  Family History   Problem Relation Name Age of Onset    Hypertension Mother      Diabetes Maternal Aunt      Hypertension Maternal Aunt      Cancer Neg Hx      Heart disease Neg Hx         Review of Systems   Negative except as in HPI     Physical Exam   Vitals:    25 1433   BP: 118/82     Body mass index is 28.09 kg/m².    Physical Exam  Constitutional:       General: She is not in acute distress.     Appearance: Normal appearance.   HENT:      Head: Normocephalic and atraumatic.   Eyes:      Extraocular Movements: Extraocular movements intact.      Pupils: Pupils are equal, round, and reactive to light.   Pulmonary:      Effort: Pulmonary effort is normal.   Musculoskeletal:         General: Normal range of motion.      Cervical back: Normal range of motion.   Neurological:      General: No focal deficit present.       Mental Status: She is alert and oriented to person, place, and time.   Skin:     General: Skin is warm and dry.   Psychiatric:         Mood and Affect: Mood normal.         Behavior: Behavior normal.         Thought Content: Thought content normal.   Vitals reviewed.          ASSESSMENT:   Annual Well Women Exam  Problem List[3]  Health Maintenance Due   Topic Date Due    Pneumococcal Vaccines (Age 50+) (1 of 1 - PCV) Never done    Influenza Vaccine (1) 09/01/2024    COVID-19 Vaccine (4 - 2024-25 season) 09/01/2024     Health Maintenance Topics with due status: Not Due       Topic Last Completion Date    TETANUS VACCINE 10/25/2017    Cervical Cancer Screening 04/28/2021    Colorectal Cancer Screening 01/19/2023    Hemoglobin A1c (Prediabetes) 12/06/2024    Lipid Panel 12/06/2024    Mammogram 12/30/2024    High Dose Statin 03/18/2025    Aspirin/Antiplatelet Therapy 03/18/2025    RSV Vaccine (Age 60+ and Pregnant patients) Not Due         PLAN:  Problem List Items Addressed This Visit          Renal/    Uterine fibroid    Relevant Orders    US Pelvis Comp with Transvag NON-OB (xpd    Left ovarian cyst - Primary    Current Assessment & Plan   Will repeat TVUS and bring patient back to discuss findings and recommendations. Consider surgical management of cyst if persistent/enlarged.     Perimenopausal based on menstrual history. Discussed pathophysiology and treatment options for fibroid. Does deal with some menstrual related pain, however cycles are starting to space out. Discussed conservative vs surgical management of fibroid. Will continue to discuss after follow up US.           Relevant Orders    US Pelvis Comp with Transvag NON-OB (xpd       Follow up in about 2 weeks (around 4/1/2025) for Follow up ultrasound results.       Callie Tan MD  Department of Obstetrics & Gynecology  [facility]         [1]   Current Outpatient Medications:     amLODIPine (NORVASC) 10 MG tablet, Take 1 tablet (10  mg total) by mouth once daily., Disp: 90 tablet, Rfl: 4    aspirin 81 MG Chew, Take 81 mg by mouth once daily., Disp: , Rfl:     atorvastatin (LIPITOR) 80 MG tablet, Take 1 tablet (80 mg total) by mouth once daily., Disp: 90 tablet, Rfl: 4    carvediloL (COREG) 25 MG tablet, TAKE 1 TABLET BY MOUTH TWICE DAILY WITH MEAL, Disp: 180 tablet, Rfl: 4    cholecalciferol, vitamin D3, 125 mcg (5,000 unit) capsule, Take 1 capsule (5,000 Units total) by mouth daily with breakfast., Disp: 100 capsule, Rfl: 4    clindamycin (CLEOCIN T) 1 % lotion, Use on face hs, Disp: 60 mL, Rfl: 3    doxycycline (VIBRA-TABS) 100 MG tablet, Take one qd with food, Disp: 30 tablet, Rfl: 0    ezetimibe (ZETIA) 10 mg tablet, Take 1 tablet (10 mg total) by mouth once daily., Disp: 90 tablet, Rfl: 3    fluticasone propionate (FLONASE) 50 mcg/actuation nasal spray, 1 spray (50 mcg total) by Each Nostril route once daily., Disp: 16 g, Rfl: 0    ketoconazole (NIZORAL) 2 % cream, Apply topically 2 (two) times daily. Prn facial rash or dryness, Disp: 45 g, Rfl: 2    levocetirizine (XYZAL) 5 MG tablet, Take 1 tablet (5 mg total) by mouth every evening., Disp: 30 tablet, Rfl: 11    losartan-hydrochlorothiazide 100-25 mg (HYZAAR) 100-25 mg per tablet, Take 1 tablet by mouth once daily., Disp: 90 tablet, Rfl: 3    metFORMIN (GLUCOPHAGE-XR) 500 MG ER 24hr tablet, Take 1 tablet (500 mg total) by mouth daily with dinner or evening meal., Disp: 90 tablet, Rfl: 4    methocarbamoL (ROBAXIN) 500 MG Tab, Take 2 tablets (1,000 mg total) by mouth 2 (two) times daily as needed (pain and spasms)., Disp: 60 tablet, Rfl: 1    potassium chloride (K-TAB) 20 mEq, Take 1 tablet (20 mEq total) by mouth once daily., Disp: 90 tablet, Rfl: 4    semaglutide, weight loss, (WEGOVY) 1.7 mg/0.75 mL PnIj, Inject 1.7 mg into the skin every 7 days., Disp: 9 mL, Rfl: 2    nitroGLYCERIN (NITROSTAT) 0.4 MG SL tablet, Place 1 tablet (0.4 mg total) under the tongue every 5 (five) minutes as  needed for Chest pain., Disp: 20 tablet, Rfl: 12    TRIAMCINOLONE ACETONIDE TOP, , Disp: , Rfl:   [2]   Social History  Tobacco Use    Smoking status: Never     Passive exposure: Never    Smokeless tobacco: Never   Substance Use Topics    Alcohol use: Yes     Alcohol/week: 2.0 standard drinks of alcohol     Types: 2 Glasses of wine per week     Comment: social    Drug use: No   [3]   Patient Active Problem List  Diagnosis    Uterine fibroid    Accelerated hypertension    Allergic rhinitis    Status post coronary artery stent placement    Plantar fasciitis    Hepatitis C antibody test positive    Diuretic-induced hypokalemia    History of non-ST elevation myocardial infarction (NSTEMI)    Coronary artery disease of native artery of native heart with stable angina pectoris    Long-term use of aspirin therapy    Prediabetes    Cubital tunnel syndrome on left    Essential hypertension    Vitamin D deficiency    Ulnar nerve palsy of right upper extremity    Acute left-sided low back pain with left-sided sciatica    Lateral pain of hip    Decreased strength of lower extremity    Hyperlipidemia LDL goal <70    Left ovarian cyst

## 2025-03-18 NOTE — TELEPHONE ENCOUNTER
Pt wants an order for PT for her back/side pain. She does not have the pain anymore she has been having stiffness. She was told to contact pain management for the referral but has not heard anything back. Is this something you can help with?

## 2025-03-18 NOTE — ASSESSMENT & PLAN NOTE
Will repeat TVUS and bring patient back to discuss findings and recommendations. Consider surgical management of cyst if persistent/enlarged.     Perimenopausal based on menstrual history. Discussed pathophysiology and treatment options for fibroid. Does deal with some menstrual related pain, however cycles are starting to space out. Discussed conservative vs surgical management of fibroid. Will continue to discuss after follow up US.

## 2025-03-18 NOTE — TELEPHONE ENCOUNTER
Chart reviewed and orders for back pain PT were entered today by Dr. Meza with pain management-- patient should be contacted shortly, KARIME Louis, PCP

## 2025-03-18 NOTE — TELEPHONE ENCOUNTER
----- Message from Skylar sent at 3/18/2025 12:11 PM CDT -----  Type:  Needs Medical AdviceWho Called: ptWould the patient rather a call back or a response via [facility]? Call Best Call Back Number: [phone] Information: pt requesting to have physical therapy orders submitted for scheduling

## 2025-03-18 NOTE — TELEPHONE ENCOUNTER
----- Message from Skylar sent at 3/18/2025 12:13 PM CDT -----  Type:  Needs Medical AdviceWho Called: ptWould the patient rather a call back or a response via [facility]? Call Best Call Back Number: [phone] Information: pt requesting to have physical therapy orders submitted for scheduling

## 2025-03-18 NOTE — TELEPHONE ENCOUNTER
Staff called pt about message that was left with the call center. Pt is requesting orders to be submitted so she can schedule her physical therapy.

## 2025-03-26 ENCOUNTER — HOSPITAL ENCOUNTER (OUTPATIENT)
Dept: RADIOLOGY | Facility: OTHER | Age: 55
Discharge: HOME | End: 2025-03-26
Attending: OBSTETRICS & GYNECOLOGY
Payer: COMMERCIAL

## 2025-03-26 ENCOUNTER — PATIENT MESSAGE (OUTPATIENT)
Dept: OBSTETRICS AND GYNECOLOGY | Facility: CLINIC | Age: 55
End: 2025-03-26
Payer: COMMERCIAL

## 2025-03-26 DIAGNOSIS — N83.202 LEFT OVARIAN CYST: ICD-10-CM

## 2025-03-26 DIAGNOSIS — D25.9 UTERINE LEIOMYOMA, UNSPECIFIED LOCATION: ICD-10-CM

## 2025-03-26 PROCEDURE — 76830 TRANSVAGINAL US NON-OB: CPT | Mod: TC

## 2025-03-26 PROCEDURE — 76830 TRANSVAGINAL US NON-OB: CPT | Mod: 26,,, | Performed by: RADIOLOGY

## 2025-03-26 PROCEDURE — 76856 US EXAM PELVIC COMPLETE: CPT | Mod: 26,,, | Performed by: RADIOLOGY

## 2025-03-28 ENCOUNTER — OFFICE VISIT (OUTPATIENT)
Dept: PULMONOLOGY | Facility: CLINIC | Age: 55
End: 2025-03-28
Payer: COMMERCIAL

## 2025-03-28 VITALS
WEIGHT: 198 LBS | HEART RATE: 81 BPM | BODY MASS INDEX: 28.35 KG/M2 | HEIGHT: 70 IN | SYSTOLIC BLOOD PRESSURE: 140 MMHG | DIASTOLIC BLOOD PRESSURE: 80 MMHG | OXYGEN SATURATION: 100 %

## 2025-03-28 DIAGNOSIS — J90 PLEURAL EFFUSION, RIGHT: Primary | ICD-10-CM

## 2025-03-28 PROCEDURE — 99214 OFFICE O/P EST MOD 30 MIN: CPT | Mod: PBBFAC | Performed by: INTERNAL MEDICINE

## 2025-03-28 PROCEDURE — 99999 PR PBB SHADOW E&M-EST. PATIENT-LVL IV: CPT | Mod: PBBFAC,,, | Performed by: INTERNAL MEDICINE

## 2025-03-28 NOTE — PROGRESS NOTES
History & Physical  [facility]    SUBJECTIVE:     Chief Complaint:   Pleural effusion    History of Present Illness:  Lizet Andrea is a 55 y.o. female who presents for evaluation of a reported abnormality on a recent chest CT.    She woke up with pain last month. She describes a sharp pain in her right flank. It lasted for 3 - 4 days then went away spontaneously. The pain has not recurred since that time. A CT renal stone protocol was done to evaluate this which showed a very small right pleural effusion. The pain was not worse with deep breathing. There were no injuries. No fevers. No strenuous activity that might have caused a strain. There was no cough, chest pain, shortness of breath, or fevers.    Review of patient's allergies indicates:   Allergen Reactions    Crab Rash    Shrimp Rash       Past Medical History:   Diagnosis Date    Accelerated hypertension 2015    Allergic rhinitis 1/3/2015    Coronary artery disease 2016    Coronary artery disease involving native coronary artery of native heart without angina pectoris 2016    Hepatitis C antibody test positive 2016    Negative HCV RNA 2016, will check again in 3 months but no detectable HCV virus HCV AB will likely remain positive for life    History of non-ST elevation myocardial infarction (NSTEMI) 2016    Menorrhagia     Obesity (BMI 30-39.9) 2015    Prediabetes 3/14/2018    Status post coronary artery stent placement 2015    LCx stent       Past Surgical History:   Procedure Laterality Date     SECTION, CLASSIC      x3    COLONOSCOPY N/A 2023    Procedure: COLONOSCOPY;  Surgeon: Trung Strickland MD;  Location: [facility];  Service: Endoscopy;  Laterality: N/A;    CORONARY STENT PLACEMENT      D&C Hysteroscopy      right arm surgery  2009    TUBAL LIGATION       Family History   Problem Relation Name Age of Onset    Hypertension Mother      Diabetes Maternal Aunt      Hypertension  "Maternal Aunt      Cancer Neg Hx      Heart disease Neg Hx       Social History[1]  Review of Systems:  No pertinent positives.    OBJECTIVE:     Vital Signs  Vitals:    03/28/25 1620   BP: (!) 140/80   BP Location: Right arm   Patient Position: Sitting   Pulse: 81   SpO2: 100%   Weight: 89.8 kg (198 lb)   Height: 5' 10" (1.778 m)     Body mass index is 28.41 kg/m².    Physical Exam:  General: no distress  Eyes:  conjunctivae/corneas clear  Nose: no discharge  Neck: trachea midline with no masses appreciated  Lungs:  normal respiratory effort, no wheezes, no rales  Heart: regular rate and rhythm and no murmur  Abdomen: non-distended  Extremities: no cyanosis, no edema, no clubbing  Skin: No rashes or lesions. good skin turgor  Neurologic: alert, oriented, thought content appropriate    Laboratory:  Lab Results   Component Value Date    WBC 3.82 (L) 02/24/2025    HGB 13.2 02/24/2025    HCT 41.6 02/24/2025    MCV 97 02/24/2025     02/24/2025     Chest Imaging, My Impression:   Chest ct 3/2025: there is a very small right-sided pleural effusion    Diagnostic Results:  TTE reviewed    ASSESSMENT/PLAN:     Pleural effusion  - Very small. Seems unlikely that this was related to her pain. Her pain has resolved now. Provided reassurance.     Librado Reynolds MD  [facility]           [1]   Social History  Socioeconomic History    Marital status:    Tobacco Use    Smoking status: Never     Passive exposure: Never    Smokeless tobacco: Never   Substance and Sexual Activity    Alcohol use: Yes     Alcohol/week: 2.0 standard drinks of alcohol     Types: 2 Glasses of wine per week     Comment: social    Drug use: No    Sexual activity: Not Currently     Partners: Male     Birth control/protection: Surgical     Social Drivers of Health     Financial Resource Strain: Low Risk  (12/26/2024)    Overall Financial Resource Strain (CARDIA)     Difficulty of Paying Living Expenses: Not very hard   Food " Insecurity: No Food Insecurity (12/26/2024)    Hunger Vital Sign     Worried About Running Out of Food in the Last Year: Never true     Ran Out of Food in the Last Year: Never true   Physical Activity: Sufficiently Active (12/26/2024)    Exercise Vital Sign     Days of Exercise per Week: 3 days     Minutes of Exercise per Session: 60 min   Stress: No Stress Concern Present (12/26/2024)    [employer] - Occupational Stress Questionnaire     Feeling of Stress : Not at all   Housing Stability: Unknown (12/26/2024)    Housing Stability Vital Sign     Unable to Pay for Housing in the Last Year: No

## 2025-04-03 ENCOUNTER — OFFICE VISIT (OUTPATIENT)
Dept: OBSTETRICS AND GYNECOLOGY | Facility: CLINIC | Age: 55
End: 2025-04-03
Payer: OTHER GOVERNMENT

## 2025-04-03 VITALS
BODY MASS INDEX: 27.75 KG/M2 | DIASTOLIC BLOOD PRESSURE: 108 MMHG | WEIGHT: 193.81 LBS | SYSTOLIC BLOOD PRESSURE: 150 MMHG | HEIGHT: 70 IN

## 2025-04-03 DIAGNOSIS — D25.9 UTERINE LEIOMYOMA, UNSPECIFIED LOCATION: Primary | ICD-10-CM

## 2025-04-03 PROCEDURE — 99999 PR PBB SHADOW E&M-EST. PATIENT-LVL IV: CPT | Mod: PBBFAC,,, | Performed by: OBSTETRICS & GYNECOLOGY

## 2025-04-03 PROCEDURE — 99214 OFFICE O/P EST MOD 30 MIN: CPT | Mod: PBBFAC,PN | Performed by: OBSTETRICS & GYNECOLOGY

## 2025-04-03 PROCEDURE — 99213 OFFICE O/P EST LOW 20 MIN: CPT | Mod: S$GLB,,, | Performed by: OBSTETRICS & GYNECOLOGY

## 2025-04-03 NOTE — PROGRESS NOTES
Chief Complaint   Patient presents with    Follow-up     Discuss ultrasound results       HPI:   55 y.o.  here today to discuss followup US results. She was referred to GYN for pelvic US showing a LOV 3.1 cm cyst and a 5 cm fibroid. Menstrual cycles used to be like clockwork but have recently become more irregular. Normal cycle in December and January, then skipped period in February and had one in March. Her cycle last week did have significant cramping. Denies menorrhagia/metrorrhagia. PMH includes coronary artery disease (h/o NSTEMI), HTN, and pre-diabetes.     TVUS (3/2025): Uterus 11.7 x 7 x 9.6 cm. Largest fibroid measuring 5 cm. EMS normal 9 mm. TRICIA wnl. LOV with simple cyst measuring 1.7 cm (shrinking in size). No FF in pelvis.     LMP Dates from Last 1 Encounters:   LMP: 2025       Labs / Significant Studies  Pap (2021): NILM/HPV neg    Past Medical History:   Diagnosis Date    Accelerated hypertension 2015    Allergic rhinitis 1/3/2015    Coronary artery disease 2016    Coronary artery disease involving native coronary artery of native heart without angina pectoris 2016    Hepatitis C antibody test positive 2016    Negative HCV RNA 2016, will check again in 3 months but no detectable HCV virus HCV AB will likely remain positive for life    History of non-ST elevation myocardial infarction (NSTEMI) 2016    Menorrhagia     Obesity (BMI 30-39.9) 2015    Prediabetes 3/14/2018    Status post coronary artery stent placement 2015    LCx stent       Past Surgical History:   Procedure Laterality Date     SECTION, CLASSIC      x3    COLONOSCOPY N/A 2023    Procedure: COLONOSCOPY;  Surgeon: Trung Strickland MD;  Location: [facility];  Service: Endoscopy;  Laterality: N/A;    CORONARY STENT PLACEMENT      D&C Hysteroscopy      right arm surgery  2009    TUBAL LIGATION       Current Medications[1]  Review of patient's allergies indicates:    Allergen Reactions    Crab Rash    Shrimp Rash     OB History    Para Term  AB Living   3 3 3   3   SAB IAB Ectopic Multiple Live Births       3      # Outcome Date GA Lbr Tong/2nd Weight Sex Type Anes PTL Lv   3 Term 03 40w0d   M CS-LTranv EPI  GUILHERME   2 Term 99 40w0d   F CS-LTranv EPI  GUILHERME   1 Term 96 40w0d   M CS-LTranv EPI  GUILHERME     Social History[2]  Family History   Problem Relation Name Age of Onset    Hypertension Mother      Diabetes Maternal Aunt      Hypertension Maternal Aunt      Cancer Neg Hx      Heart disease Neg Hx         Review of Systems   Negative except as in HPI    Physical Exam   Vitals:    25 1619   BP: (!) 150/108     Body mass index is 27.81 kg/m².    Physical Exam  Constitutional:       General: She is not in acute distress.     Appearance: Normal appearance.   HENT:      Head: Normocephalic and atraumatic.   Eyes:      Extraocular Movements: Extraocular movements intact.      Pupils: Pupils are equal, round, and reactive to light.   Pulmonary:      Effort: Pulmonary effort is normal.   Musculoskeletal:         General: Normal range of motion.      Cervical back: Normal range of motion.   Neurological:      General: No focal deficit present.      Mental Status: She is alert and oriented to person, place, and time.   Skin:     General: Skin is warm and dry.   Psychiatric:         Mood and Affect: Mood normal.         Behavior: Behavior normal.         Thought Content: Thought content normal.   Vitals reviewed.        Labs reviewed: Pap, HPV, TVUS    ASSESSMENT:   Problem List[3]    PLAN:  Problem List Items Addressed This Visit          Renal/    Uterine fibroid - Primary    Current Assessment & Plan   Patient instructed to keep a bleeding/symptom journal. If menstrual cycles are becoming heavier, more irregular, or more painful would recommend treatment for AUB. Medical and minimally invasive options reviewed for fibroid management. Patient will  contact office with worsening symptoms.              Total time spent on this encounter was 8 minutes.  This includes preparing to see the patient;  obtaining/reviewing separately obtained history;  performing a medical exam and/or evaluation;   counseling/educating the patient;  ordering medications, tests, of procedures;   referring/communicating with other health care professionals;  EMR documentation;  interpreting/communicating results to the patient;  and/or care coordination.    Follow up in about 3 months (around 7/3/2025) for Annual.       Callie Tan MD  Department of Obstetrics & Gynecology  [facility]         [1]   Current Outpatient Medications:     amLODIPine (NORVASC) 10 MG tablet, Take 1 tablet (10 mg total) by mouth once daily., Disp: 90 tablet, Rfl: 4    aspirin 81 MG Chew, Take 81 mg by mouth once daily., Disp: , Rfl:     atorvastatin (LIPITOR) 80 MG tablet, Take 1 tablet (80 mg total) by mouth once daily., Disp: 90 tablet, Rfl: 4    carvediloL (COREG) 25 MG tablet, TAKE 1 TABLET BY MOUTH TWICE DAILY WITH MEAL, Disp: 180 tablet, Rfl: 4    cholecalciferol, vitamin D3, 125 mcg (5,000 unit) capsule, Take 1 capsule (5,000 Units total) by mouth daily with breakfast., Disp: 100 capsule, Rfl: 4    clindamycin (CLEOCIN T) 1 % lotion, Use on face hs, Disp: 60 mL, Rfl: 3    doxycycline (VIBRA-TABS) 100 MG tablet, Take one qd with food, Disp: 30 tablet, Rfl: 0    ezetimibe (ZETIA) 10 mg tablet, Take 1 tablet (10 mg total) by mouth once daily., Disp: 90 tablet, Rfl: 3    fluticasone propionate (FLONASE) 50 mcg/actuation nasal spray, 1 spray (50 mcg total) by Each Nostril route once daily., Disp: 16 g, Rfl: 0    ketoconazole (NIZORAL) 2 % cream, Apply topically 2 (two) times daily. Prn facial rash or dryness, Disp: 45 g, Rfl: 2    levocetirizine (XYZAL) 5 MG tablet, Take 1 tablet (5 mg total) by mouth every evening., Disp: 30 tablet, Rfl: 11    losartan-hydrochlorothiazide 100-25 mg  (HYZAAR) 100-25 mg per tablet, Take 1 tablet by mouth once daily., Disp: 90 tablet, Rfl: 3    metFORMIN (GLUCOPHAGE-XR) 500 MG ER 24hr tablet, Take 1 tablet (500 mg total) by mouth daily with dinner or evening meal., Disp: 90 tablet, Rfl: 4    potassium chloride (K-TAB) 20 mEq, Take 1 tablet (20 mEq total) by mouth once daily., Disp: 90 tablet, Rfl: 4    semaglutide, weight loss, (WEGOVY) 1.7 mg/0.75 mL PnIj, Inject 1.7 mg into the skin every 7 days., Disp: 9 mL, Rfl: 2    TRIAMCINOLONE ACETONIDE TOP, , Disp: , Rfl:     nitroGLYCERIN (NITROSTAT) 0.4 MG SL tablet, Place 1 tablet (0.4 mg total) under the tongue every 5 (five) minutes as needed for Chest pain., Disp: 20 tablet, Rfl: 12  [2]   Social History  Tobacco Use    Smoking status: Never     Passive exposure: Never    Smokeless tobacco: Never   Substance Use Topics    Alcohol use: Yes     Alcohol/week: 2.0 standard drinks of alcohol     Types: 2 Glasses of wine per week     Comment: social    Drug use: No   [3]   Patient Active Problem List  Diagnosis    Uterine fibroid    Accelerated hypertension    Allergic rhinitis    Status post coronary artery stent placement    Plantar fasciitis    Hepatitis C antibody test positive    Diuretic-induced hypokalemia    History of non-ST elevation myocardial infarction (NSTEMI)    Coronary artery disease of native artery of native heart with stable angina pectoris    Long-term use of aspirin therapy    Prediabetes    Cubital tunnel syndrome on left    Essential hypertension    Vitamin D deficiency    Ulnar nerve palsy of right upper extremity    Acute left-sided low back pain with left-sided sciatica    Lateral pain of hip    Decreased strength of lower extremity    Hyperlipidemia LDL goal <70    Left ovarian cyst

## 2025-04-06 NOTE — ASSESSMENT & PLAN NOTE
Patient instructed to keep a bleeding/symptom journal. If menstrual cycles are becoming heavier, more irregular, or more painful would recommend treatment for AUB. Medical and minimally invasive options reviewed for fibroid management. Patient will contact office with worsening symptoms.

## 2025-04-09 ENCOUNTER — CLINICAL SUPPORT (OUTPATIENT)
Dept: REHABILITATION | Facility: HOSPITAL | Age: 55
End: 2025-04-09
Attending: ANESTHESIOLOGY
Payer: COMMERCIAL

## 2025-04-09 DIAGNOSIS — M54.2 ACUTE NECK PAIN: Primary | ICD-10-CM

## 2025-04-09 DIAGNOSIS — R10.9 RIGHT FLANK PAIN: ICD-10-CM

## 2025-04-09 DIAGNOSIS — J90 PLEURISY WITH EFFUSION: ICD-10-CM

## 2025-04-09 PROCEDURE — 97161 PT EVAL LOW COMPLEX 20 MIN: CPT | Mod: PO

## 2025-04-09 PROCEDURE — 97140 MANUAL THERAPY 1/> REGIONS: CPT | Mod: PO

## 2025-04-09 NOTE — PROGRESS NOTES
Outpatient Rehab    Physical Therapy Evaluation    Patient Name: Lizet Andrea  MRN: [id number]  YOB: 1970  Encounter Date: 4/9/2025    Therapy Diagnosis:   Encounter Diagnoses   Name Primary?    Right flank pain     Pleurisy with effusion      Physician: Merary Meza MD    Physician Orders: Eval and Treat  Medical Diagnosis: Right flank pain  Pleurisy with effusion    Visit # / Visits Authorized:  1 / 1  Insurance Authorization Period: 3/18/2025 to 12/31/2025  Date of Evaluation: 4/9/2025  Plan of Care Certification: 4/9/2025 to 6/4/2025     Time In: 1500   Time Out: 1545  Total Time: 45   Total Billable Time: 45    Intake Outcome Measure for FOTO Survey    Therapist reviewed FOTO scores for Lizet Andrea on 4/9/2025.   FOTO report - see Media section or FOTO account episode details.     Intake Score:  %         Subjective   History of Present Illness  Lizet is a 55 y.o. female who reports to physical therapy with a chief concern of Bilateral Neck Pain.         Diagnostic tests related to this condition: None.        History of Present Condition/Illness: Bilateral neck pain for about a month. She does not recall any particular mechanism of injury. She notes that turning her head and sidebending increases her symptoms. She denies any pain with upper extremity movements or numbness/tingling. Sleeping is okay; pain does not keep her up or wake her up.    Pain     Patient reports a current pain level of 0/10. Pain at best is reported as 0/10. Pain at worst is reported as 7/10.   Location: Bilateral Neck  Clinical Progression (since onset): Stable  Pain Qualities: Aching, Sharp  Pain-Relieving Factors: Activity modification, Change in position, Relaxation, Rest  Pain-Aggravating Factors: Driving, Head movements, Movement, Rotation         Treatment History  Treatments  Previously Received Treatments: No    Living Arrangements  Living Situation  Housing: Home  independently        Employment  Patient does not report that: Does the patient's condition impact their ability to work?  Employment Status: Employed full-time   [employer]      Past Medical History/Physical Systems Review:   Lizet Andrea  has a past medical history of Accelerated hypertension, Allergic rhinitis, Coronary artery disease, Coronary artery disease involving native coronary artery of native heart without angina pectoris, Hepatitis C antibody test positive, History of non-ST elevation myocardial infarction (NSTEMI), Menorrhagia, Obesity (BMI 30-39.9), Prediabetes, and Status post coronary artery stent placement.    Lizet Adnrea  has a past surgical history that includes  section, classic; right arm surgery (); D&C Hysteroscopy; Coronary stent placement; Tubal ligation; and Colonoscopy (N/A, 2023).    Lizet has a current medication list which includes the following prescription(s): amlodipine, aspirin, atorvastatin, carvedilol, cholecalciferol (vitamin d3), clindamycin, doxycycline, ezetimibe, fluticasone propionate, ketoconazole, levocetirizine, losartan-hydrochlorothiazide 100-25 mg, metformin, nitroglycerin, potassium chloride, wegovy, and triamcinolone acetonide.    Review of patient's allergies indicates:   Allergen Reactions    Crab Rash    Shrimp Rash        Objective   Posture    Flat thoracic spine is observed.     Right scapula is: Depressed  Bilateral scapulae are: Downwardly Rotated         Excessive upper thoracic kyphosis with flat mid thoracic; bilateral scapular anterior tip and internal rotation     Spinal Mobility  Hypomobile: Cervical and Thoracic  Cervical Mobility Details: C0-1 flex/ext: hypomobile; C1-2 and C1-3: hypomobile bilaterally; Mid cervical sideglides: hypomobile (right>left)  Thoracic Mobility Details: CT junction        Subcranial Range of Motion   Active Restricted? Passive Restricted? Pain   Flexion         Protraction          Retraction           Cervical Range of Motion   Active (deg) Passive (deg) Pain   Flexion 60       Extension 45   Yes (L-sided pain)   Right Lateral Flexion 30   Yes (L-sided pain)   Right Rotation 60   Yes (Bilateral pain)   Left Lateral Flexion 25   Yes (R-sided pain)   Left Rotation 55   Yes (Bilateral pain)          Shoulder Range of Motion     Shoulder, Elbow, or Forearm Range of Motion Details: Shoulder range of motion: full, pain-free bilaterally         Shoulder Strength - Planes of Motion   Right Strength Right Pain Left Strength Left  Pain   Flexion 4   4     Extension           ABduction 4   4     ADduction           Horizontal ABduction           Horizontal ADduction           Internal Rotation 0° 4+   4+     Internal Rotation 90°           External Rotation 0° 4-   4-     External Rotation 90°                            Treatment:  Manual Therapy  MT 1: Mid cervical sideglides, grade III bilateral  MT 2: Seated CT junction HVLAT      Time Entry(in minutes):  PT Evaluation (Low) Time Entry: 35  Manual Therapy Time Entry: 10    Assessment & Plan   Assessment  Lizet presents with a condition of Low complexity.   Presentation of Symptoms: Stable  Will Comorbidities Impact Care: No       Functional Limitations: Activity tolerance, Driving, Bed mobility, Completing self-care activities, Completing work/school activities, Functional mobility, Pain with ADLs/IADLs, Participating in leisure activities, Performing household chores, Range of motion  Impairments: Abnormal muscle firing, Abnormal or restricted range of motion, Activity intolerance, Impaired physical strength, Lack of appropriate home exercise program, Pain with functional activity  Personal Factors Affecting Prognosis: Pain, Schedule    Patient Goal for Therapy (PT): to improve her neck pain and cervical mobility  Prognosis: Good  Assessment Details: Ms. Hinds presents to PT evaluation with chief complaint of bilateral neck pain. She  demonstrates decreased cervical active range of motion, limitations particularly with upper cervical mobility, decreased upper extremity strength, and postural deficits. These limitations are affecting her performance of activities of daily living and leisure activities. Plan to work to improve cervical and thoracic mobility, DNF activation/endurance, and periscapular strength. Patient noted needing to leave early to head back to work. Home exercise program was emailed to her but was not performed today in clinic.    Plan  From a physical therapy perspective, the patient would benefit from: Skilled Rehab Services    Planned therapy interventions include: Therapeutic exercise, Therapeutic activities, Neuromuscular re-education, Manual therapy, and ADLs/IADLs.    Planned modalities to include: Cryotherapy (cold pack), Electrical stimulation - attended, Electrical stimulation - passive/unattended, and Thermotherapy (hot pack).        Visit Frequency: 2 times Per Week for 8 Weeks.       This plan was discussed with Patient.   Discussion participants: Agreed Upon Plan of Care  Plan details: Improve cervical and thoracic mobility, DNF activation/endurance, and periscapular strength          Patient's spiritual, cultural, and educational needs considered and patient agreeable to plan of care and goals.     Education  Education was done with Patient. The patient's learning style includes Demonstration, Listening, and Pictures/video. The patient Verbalizes understanding and Demonstrates understanding.         Diagnosis and prognosis, Plan of care, and Home exercise program        Goals:   Active       Functional outcome       Patient will show a significant change in FOTO patient-reported outcome tool to demonstrate subjective improvement       Start:  04/15/25    Expected End:  06/04/25            Patient stated goal: to improve her neck pain and cervical mobility        Start:  04/15/25    Expected End:  06/04/25             Patient will demonstrate independence in home program for support of progression       Start:  04/15/25    Expected End:  06/04/25               Pain       Patient will report pain at worst of </= 3/10 demonstrating a reduction of overall pain       Start:  04/15/25    Expected End:  06/04/25               Range of Motion       Patient will achieve bilateral cervical rotation ROM >/= 65 degrees       Start:  04/15/25    Expected End:  06/04/25                Dav Mendez, PT

## 2025-04-15 PROBLEM — M54.2 ACUTE NECK PAIN: Status: ACTIVE | Noted: 2025-04-15

## 2025-04-22 ENCOUNTER — TELEPHONE (OUTPATIENT)
Dept: INTERNAL MEDICINE | Facility: CLINIC | Age: 55
End: 2025-04-22
Payer: OTHER GOVERNMENT

## 2025-04-22 NOTE — TELEPHONE ENCOUNTER
----- Message from Tech Amaiyaneth sent at 2025  9:17 AM CDT -----  Contact: [phone]  [email or web address] Patient is calling for Medical Advice regarding:  Home wants death certificate signedPatient wants a call back or thru [facility]: callComments: fax:[phone] advise patient replies from provider may take up to 48 hours.

## 2025-04-22 NOTE — TELEPHONE ENCOUNTER
WAS CONTACTED BY THE [facility] REGARDING SIGNING PATIENT'S DEATH CERTIFICATE-- was unaware that patient had passed until today.  Called her mom-listed as emergency contact for more information.    Her mom reports that patient was found  on the morning of  after having a seemingly normal day on .    She did not report feelings of illness, chest pain or any issues of concern.      She was found  in her bed by her son.  No concerns for foul play, no autopsy planned.    Per chart review her blood pressure was noted to have been significantly uncontrolled with recently poor blood pressure medication compliance-- had had readings recently as high as 224/130.  She had known coronary artery disease with prior history of NSTEMI with history of coronary artery stent.    Recent diagnosis of prediabetes.      Suspect patient past of a coronary event given her history.     Will sign in to [facility] to complete the Death certificate per request.     KARIME Louis MD, PCP for Lizet Andrea

## 2025-05-11 NOTE — PROGRESS NOTES
Subjective:       Patient ID:  Lizet Palencia is a 52 y.o. female who presents for   Chief Complaint   Patient presents with    Skin Discoloration     Face, X4mos, light spots of cheeks, RX tretinoin  cream and clinda lotion      Skin Discoloration - Initial  Affected locations: face    Review of Systems   Constitutional:  Negative for fever and chills.   HENT:  Negative for sore throat.    Respiratory:  Negative for cough.    Skin:  Positive for rash and dry skin.      Objective:    Physical Exam       Diagram Legend     Erythematous scaling macule/papule c/w actinic keratosis       Vascular papule c/w angioma      Pigmented verrucoid papule/plaque c/w seborrheic keratosis      Yellow umbilicated papule c/w sebaceous hyperplasia      Irregularly shaped tan macule c/w lentigo     1-2 mm smooth white papules consistent with Milia      Movable subcutaneous cyst with punctum c/w epidermal inclusion cyst      Subcutaneous movable cyst c/w pilar cyst      Firm pink to brown papule c/w dermatofibroma      Pedunculated fleshy papule(s) c/w skin tag(s)      Evenly pigmented macule c/w junctional nevus     Mildly variegated pigmented, slightly irregular-bordered macule c/w mildly atypical nevus      Flesh colored to evenly pigmented papule c/w intradermal nevus       Pink pearly papule/plaque c/w basal cell carcinoma      Erythematous hyperkeratotic cursted plaque c/w SCC      Surgical scar with no sign of skin cancer recurrence      Open and closed comedones      Inflammatory papules and pustules      Verrucoid papule consistent consistent with wart     Erythematous eczematous patches and plaques     Dystrophic onycholytic nail with subungual debris c/w onychomycosis     Umbilicated papule    Erythematous-base heme-crusted tan verrucoid plaque consistent with inflamed seborrheic keratosis     Erythematous Silvery Scaling Plaque c/w Psoriasis     See annotation                    Assessment / Plan:         Hypopigmentation  -     NB-UVB Light Therapy; Standing  Discussed with the patient the risk of color scars, erythema, or hyperpigmentation that could take months to resolve.  Pt wants to try uvb.  Discussed patient's case and care with health provider or derm colleague.    Unknown skin lesion  -     Ambulatory referral/consult to Dermatology  None noted today.  [facility] and or [facility] and notes reviewed and considered for their impact on our clinical decision making today.    Encounter for skin care  No hot water bathing reviewed.    Seborrheic dermatitis  -     ketoconazole (NIZORAL) 2 % cream; Apply topically 2 (two) times daily. Prn facial rash or dryness  Dispense: 45 g; Refill: 2  Pictures reviewed on patient's phone.  Consistent with suspected diagnosis.  Pt reports on and off dryness and pink on med cheeks.  Discussed with patient the etiology and pathogenesis of the disease or skin lesion(s) and possible treatments and aggravators.    Reviewed with patient different treatment options and associated risks.  Proper application of medications and or care for affected area(s) and condition(s) reviewed.  Patient and or guardian to monitor this area/lesion or these areas/lesions for changes or worsening or darkening (for moles and freckles).  Patient and or guardian to contact us if any changes are noted for such.  Pt denies any flares with sun.  Prn TONE check.  Discussed with the patient the risk of color scars, erythema, or hyperpigmentation that could take months to resolve.  Instructed patient to use plain Head and Shoulders shampoo regularly for soaks lasting at least 3 minutes or more to the scalp and or face as directed.  Regular shampoo and conditioner afterward is fine.  For suspected allergy cases, rinse away from the face and body discussed.           Follow up in about 1 year (around 11/29/2023).     No